# Patient Record
Sex: FEMALE | Race: WHITE | NOT HISPANIC OR LATINO | Employment: OTHER | URBAN - METROPOLITAN AREA
[De-identification: names, ages, dates, MRNs, and addresses within clinical notes are randomized per-mention and may not be internally consistent; named-entity substitution may affect disease eponyms.]

---

## 2017-01-17 ENCOUNTER — OFFICE VISIT (OUTPATIENT)
Dept: NEUROSURGERY | Facility: CLINIC | Age: 82
End: 2017-01-17

## 2017-01-17 VITALS
DIASTOLIC BLOOD PRESSURE: 82 MMHG | BODY MASS INDEX: 23.98 KG/M2 | HEART RATE: 79 BPM | WEIGHT: 127 LBS | SYSTOLIC BLOOD PRESSURE: 132 MMHG | HEIGHT: 61 IN

## 2017-01-17 DIAGNOSIS — M51.16 NEURITIS OR RADICULITIS DUE TO RUPTURE OF LUMBAR INTERVERTEBRAL DISC: Primary | ICD-10-CM

## 2017-01-17 PROCEDURE — 99203 OFFICE O/P NEW LOW 30 MIN: CPT | Performed by: NEUROLOGICAL SURGERY

## 2017-01-17 NOTE — PROGRESS NOTES
Subjective   Patient ID: Nyasia Dalal is a 83 y.o. female is being seen for consultation today at the request of Guilherme Oglesby MD for back pain that radiates into left leg with numbness in left thigh.    History of Present Illness    This patient has been having pain in her back and into her left leg since last August.  She says that she has had 2 episodes of severe pain.  These both lasted a couple of days and then it went away.  In the mornings she has pain in her left buttock but this goes away with activity.  She has also had some numbness in her anterolateral thigh on the left side for quite some time.  Her right leg for the most part is okay and she has no difficulty with bowel and bladder control or other associated symptoms.  She says that sometimes the pain is worse in the morning when she has been laying down all night.  Nothing else specifically makes it worse.  She has been treated with some steroids and with muscle relaxants which helped.  She has had no other treatment so far.    The following portions of the patient's history were reviewed and updated as appropriate: allergies, current medications, past family history, past medical history, past social history, past surgical history and problem list.    Review of Systems   Constitutional: Positive for appetite change and fatigue.   HENT: Positive for rhinorrhea.    Respiratory: Negative for chest tightness and shortness of breath.    Cardiovascular: Negative for chest pain.   Musculoskeletal: Positive for back pain.   Neurological: Positive for weakness and numbness ( Left thigh).        Tingling in upper left thigh   Psychiatric/Behavioral: The patient is nervous/anxious.    All other systems reviewed and are negative.      Objective   Physical Exam   Constitutional: She is oriented to person, place, and time. She appears well-developed and well-nourished.   HENT:   Head: Normocephalic and atraumatic.   Eyes: Conjunctivae and EOM are normal.  Pupils are equal, round, and reactive to light.   Fundoscopic exam:       The right eye shows no papilledema. The right eye shows venous pulsations.        The left eye shows no papilledema. The left eye shows venous pulsations.   Neck: Carotid bruit is not present.   Neurological: She is oriented to person, place, and time. She has a normal Finger-Nose-Finger Test and a normal Heel to Shin Test. Gait normal.   Reflex Scores:       Tricep reflexes are 2+ on the right side and 2+ on the left side.       Bicep reflexes are 2+ on the right side and 2+ on the left side.       Brachioradialis reflexes are 2+ on the right side and 2+ on the left side.       Patellar reflexes are 2+ on the right side and 2+ on the left side.       Achilles reflexes are 2+ on the right side and 2+ on the left side.  Psychiatric: Her speech is normal.     Neurologic Exam     Mental Status   Oriented to person, place, and time.   Registration of memory: Good recent and remote memory.   Attention: normal. Concentration: normal.   Speech: speech is normal   Level of consciousness: alert  Knowledge: consistent with education.     Cranial Nerves     CN II   Visual fields full to confrontation.   Visual acuity: normal    CN III, IV, VI   Pupils are equal, round, and reactive to light.  Extraocular motions are normal.     CN V   Facial sensation intact.   Right corneal reflex: normal  Left corneal reflex: normal    CN VII   Facial expression full, symmetric.   Right facial weakness: none  Left facial weakness: none    CN VIII   Hearing: intact    CN IX, X   Palate: symmetric    CN XI   Right sternocleidomastoid strength: normal  Left sternocleidomastoid strength: normal    CN XII   Tongue: not atrophic  Tongue deviation: none    Motor Exam   Muscle bulk: normal  Right arm tone: normal  Left arm tone: normal  Right leg tone: normal  Left leg tone: normal    Strength   Strength 5/5 except as noted.     Sensory Exam   Light touch normal.     Gait,  Coordination, and Reflexes     Gait  Gait: normal    Coordination   Finger to nose coordination: normal  Heel to shin coordination: normal    Reflexes   Right brachioradialis: 2+  Left brachioradialis: 2+  Right biceps: 2+  Left biceps: 2+  Right triceps: 2+  Left triceps: 2+  Right patellar: 2+  Left patellar: 2+  Right achilles: 2+  Left achilles: 2+  Right : 2+  Left : 2+      Assessment/Plan   Independent Review of Radiographic Studies:      I reviewed the plain films and the MRI done in December of last year.  The plain films show a grade 1 spondylolisthesis almost 2 of L4 on L5 and multilevel degenerative change.  Flexion and extension films were not done but there is not much scoliosis on the AP film.  On the MRI itself there is a herniated disc off to the left side at L2-3 extending up behind the L2 vertebral body.  L1 2 is fairly open.  L3 4 is fairly narrow due to lateral recess stenosis at L4 5 shows virtually no canal at all.  L5-S1 is also fairly open.    Medical Decision Making:      I told the patient and her daughter about the imaging and the exam.  Other than some numbness she really doesn't have much in the way of a neurologic deficit.  I told her that with her age I would tend to stay away from any type of surgery.  I think that most of her symptoms are coming from the herniated disc on the left side at L2-3 and not from the stenosis.  I am concerned however that even a minimally invasive surgery at L2-3 could flare of the other levels up.  Consequently I think we should really try to stay away from surgery.  I told her that we should also stay away from epidural blocks since her pain is not that severe right now.  I initially suggested some physical therapy but she does water aerobics on around but hasn't done them for a while.  Consequently I think she should just returned to her water aerobics and hold off on any physical therapy.  I told her that if the water aerobics are not helping  she should call and we can always order physical therapy later on.    Nyasia was seen today for back pain.    Diagnoses and all orders for this visit:    Neuritis or radiculitis due to rupture of lumbar intervertebral disc    No Follow-up on file.

## 2017-01-17 NOTE — LETTER
January 17, 2017     Guilherme Oglesby MD  [address] [address] [facility] [address]  [facility] [address]    Patient: Nyasia Dalal   YOB: 1933   Date of Visit: 1/17/2017       Dear Dr. Mendel MD:    Thank you for referring Nyasia Dalal to me for evaluation. Below are the relevant portions of my assessment and plan of care.      Independent Review of Radiographic Studies:      I reviewed the plain films and the MRI done in December of last year.  The plain films show a grade 1 spondylolisthesis almost 2 of L4 on L5 and multilevel degenerative change.  Flexion and extension films were not done but there is not much scoliosis on the AP film.  On the MRI itself there is a herniated disc off to the left side at L2-3 extending up behind the L2 vertebral body.  L1 2 is fairly open.  L3 4 is fairly narrow due to lateral recess stenosis at L4 5 shows virtually no canal at all.  L5-S1 is also fairly open.    Medical Decision Making:      I told the patient and her daughter about the imaging and the exam.  Other than some numbness she really doesn't have much in the way of a neurologic deficit.  I told her that with her age I would tend to stay away from any type of surgery.  I think that most of her symptoms are coming from the herniated disc on the left side at L2-3 and not from the stenosis.  I am concerned however that even a minimally invasive surgery at L2-3 could flare of the other levels up.  Consequently I think we should really try to stay away from surgery.  I told her that we should also stay away from epidural blocks since her pain is not that severe right now.  I initially suggested some physical therapy but she does water aerobics on around but hasn't done them for a while.  Consequently I think she should just returned to her water aerobics and hold off on any physical therapy.  I told her that if the water aerobics are not helping she should call and we can always order physical therapy later on.    Nyasia  was seen today for back pain.    Diagnoses and all orders for this visit:    Neuritis or radiculitis due to rupture of lumbar intervertebral disc    No Follow-up on file.                    If you have questions, please do not hesitate to call me. I look forward to following Nyasia along with you.         Sincerely,        Hany Barriga MD        CC: No Recipients

## 2017-08-29 ENCOUNTER — TRANSCRIBE ORDERS (OUTPATIENT)
Dept: ADMINISTRATIVE | Facility: HOSPITAL | Age: 82
End: 2017-08-29

## 2017-08-29 DIAGNOSIS — R41.3 MEMORY LOSS: Primary | ICD-10-CM

## 2017-09-06 ENCOUNTER — LAB (OUTPATIENT)
Dept: LAB | Facility: HOSPITAL | Age: 82
End: 2017-09-06

## 2017-09-06 ENCOUNTER — TRANSCRIBE ORDERS (OUTPATIENT)
Dept: ADMINISTRATIVE | Facility: HOSPITAL | Age: 82
End: 2017-09-06

## 2017-09-06 ENCOUNTER — HOSPITAL ENCOUNTER (OUTPATIENT)
Dept: MRI IMAGING | Facility: HOSPITAL | Age: 82
Discharge: HOME | End: 2017-09-06
Admitting: INTERNAL MEDICINE

## 2017-09-06 ENCOUNTER — HOSPITAL ENCOUNTER (OUTPATIENT)
Dept: GENERAL RADIOLOGY | Facility: HOSPITAL | Age: 82
Discharge: HOME | End: 2017-09-06

## 2017-09-06 DIAGNOSIS — M25.552 PAIN OF LEFT HIP JOINT: ICD-10-CM

## 2017-09-06 DIAGNOSIS — M81.0 OSTEOPOROSIS: ICD-10-CM

## 2017-09-06 DIAGNOSIS — D64.9 ANEMIA, UNSPECIFIED: ICD-10-CM

## 2017-09-06 DIAGNOSIS — D64.9 ANEMIA, UNSPECIFIED: Primary | ICD-10-CM

## 2017-09-06 DIAGNOSIS — R41.3 MEMORY LOSS: ICD-10-CM

## 2017-09-06 LAB
25(OH)D3 SERPL-MCNC: 48.1 NG/ML (ref 30–100)
ALBUMIN SERPL-MCNC: 4.4 G/DL (ref 3.5–5.2)
ALBUMIN/GLOB SERPL: 1.4 G/DL
ALP SERPL-CCNC: 64 U/L (ref 39–117)
ALT SERPL W P-5'-P-CCNC: 17 U/L (ref 1–33)
ANION GAP SERPL CALCULATED.3IONS-SCNC: 13.7 MMOL/L
AST SERPL-CCNC: 20 U/L (ref 1–32)
BASOPHILS # BLD AUTO: 0.05 10*3/MM3 (ref 0–0.2)
BASOPHILS NFR BLD AUTO: 0.7 % (ref 0–1.5)
BILIRUB SERPL-MCNC: 0.5 MG/DL (ref 0.1–1.2)
BUN BLD-MCNC: 20 MG/DL (ref 8–23)
BUN/CREAT SERPL: 28.6 (ref 7–25)
CALCIUM SPEC-SCNC: 9.9 MG/DL (ref 8.6–10.5)
CHLORIDE SERPL-SCNC: 96 MMOL/L (ref 98–107)
CO2 SERPL-SCNC: 28.3 MMOL/L (ref 22–29)
CREAT BLD-MCNC: 0.7 MG/DL (ref 0.57–1)
CREAT BLDA-MCNC: 0.7 MG/DL (ref 0.6–1.3)
DEPRECATED RDW RBC AUTO: 49.6 FL (ref 37–54)
EOSINOPHIL # BLD AUTO: 0.14 10*3/MM3 (ref 0–0.7)
EOSINOPHIL NFR BLD AUTO: 1.9 % (ref 0.3–6.2)
ERYTHROCYTE [DISTWIDTH] IN BLOOD BY AUTOMATED COUNT: 13.7 % (ref 11.7–13)
FOLATE SERPL-MCNC: >20 NG/ML (ref 4.78–24.2)
GFR SERPL CREATININE-BSD FRML MDRD: 80 ML/MIN/1.73
GLOBULIN UR ELPH-MCNC: 3.1 GM/DL
GLUCOSE BLD-MCNC: 96 MG/DL (ref 65–99)
HCT VFR BLD AUTO: 39.2 % (ref 35.6–45.5)
HGB BLD-MCNC: 12.4 G/DL (ref 11.9–15.5)
IMM GRANULOCYTES # BLD: 0 10*3/MM3 (ref 0–0.03)
IMM GRANULOCYTES NFR BLD: 0 % (ref 0–0.5)
LYMPHOCYTES # BLD AUTO: 2.15 10*3/MM3 (ref 0.9–4.8)
LYMPHOCYTES NFR BLD AUTO: 28.8 % (ref 19.6–45.3)
MCH RBC QN AUTO: 31.3 PG (ref 26.9–32)
MCHC RBC AUTO-ENTMCNC: 31.6 G/DL (ref 32.4–36.3)
MCV RBC AUTO: 99 FL (ref 80.5–98.2)
MONOCYTES # BLD AUTO: 0.73 10*3/MM3 (ref 0.2–1.2)
MONOCYTES NFR BLD AUTO: 9.8 % (ref 5–12)
NEUTROPHILS # BLD AUTO: 4.39 10*3/MM3 (ref 1.9–8.1)
NEUTROPHILS NFR BLD AUTO: 58.8 % (ref 42.7–76)
PLATELET # BLD AUTO: 272 10*3/MM3 (ref 140–500)
PMV BLD AUTO: 11.4 FL (ref 6–12)
POTASSIUM BLD-SCNC: 4.3 MMOL/L (ref 3.5–5.2)
PROT SERPL-MCNC: 7.5 G/DL (ref 6–8.5)
RBC # BLD AUTO: 3.96 10*6/MM3 (ref 3.9–5.2)
SODIUM BLD-SCNC: 138 MMOL/L (ref 136–145)
TSH SERPL DL<=0.05 MIU/L-ACNC: 1.23 MIU/ML (ref 0.27–4.2)
VIT B12 BLD-MCNC: 611 PG/ML (ref 211–946)
WBC NRBC COR # BLD: 7.46 10*3/MM3 (ref 4.5–10.7)

## 2017-09-06 PROCEDURE — 0 GADOBENATE DIMEGLUMINE 529 MG/ML SOLUTION: Performed by: INTERNAL MEDICINE

## 2017-09-06 PROCEDURE — 80053 COMPREHEN METABOLIC PANEL: CPT | Performed by: INTERNAL MEDICINE

## 2017-09-06 PROCEDURE — 82306 VITAMIN D 25 HYDROXY: CPT | Performed by: INTERNAL MEDICINE

## 2017-09-06 PROCEDURE — 85025 COMPLETE CBC W/AUTO DIFF WBC: CPT | Performed by: INTERNAL MEDICINE

## 2017-09-06 PROCEDURE — 82607 VITAMIN B-12: CPT | Performed by: INTERNAL MEDICINE

## 2017-09-06 PROCEDURE — 82746 ASSAY OF FOLIC ACID SERUM: CPT | Performed by: INTERNAL MEDICINE

## 2017-09-06 PROCEDURE — 70553 MRI BRAIN STEM W/O & W/DYE: CPT

## 2017-09-06 PROCEDURE — 73502 X-RAY EXAM HIP UNI 2-3 VIEWS: CPT

## 2017-09-06 PROCEDURE — 36415 COLL VENOUS BLD VENIPUNCTURE: CPT

## 2017-09-06 PROCEDURE — A9577 INJ MULTIHANCE: HCPCS | Performed by: INTERNAL MEDICINE

## 2017-09-06 PROCEDURE — 82565 ASSAY OF CREATININE: CPT

## 2017-09-06 PROCEDURE — 84443 ASSAY THYROID STIM HORMONE: CPT | Performed by: INTERNAL MEDICINE

## 2017-09-06 RX ADMIN — GADOBENATE DIMEGLUMINE 11 ML: 529 INJECTION, SOLUTION INTRAVENOUS at 10:45

## 2017-09-19 ENCOUNTER — TRANSCRIBE ORDERS (OUTPATIENT)
Dept: ADMINISTRATIVE | Facility: HOSPITAL | Age: 82
End: 2017-09-19

## 2017-09-19 DIAGNOSIS — M81.0 OSTEOPOROSIS, UNSPECIFIED: Primary | ICD-10-CM

## 2017-09-28 ENCOUNTER — HOSPITAL ENCOUNTER (OUTPATIENT)
Dept: INFUSION THERAPY | Facility: HOSPITAL | Age: 82
Discharge: HOME | End: 2017-09-28
Admitting: INTERNAL MEDICINE

## 2017-09-28 VITALS
HEART RATE: 69 BPM | SYSTOLIC BLOOD PRESSURE: 122 MMHG | BODY MASS INDEX: 23 KG/M2 | DIASTOLIC BLOOD PRESSURE: 64 MMHG | RESPIRATION RATE: 20 BRPM | TEMPERATURE: 97.9 F | OXYGEN SATURATION: 96 % | HEIGHT: 62 IN | WEIGHT: 125 LBS

## 2017-09-28 DIAGNOSIS — M81.0 OSTEOPOROSIS: ICD-10-CM

## 2017-09-28 PROCEDURE — 96365 THER/PROPH/DIAG IV INF INIT: CPT

## 2017-09-28 PROCEDURE — 25010000002 ZOLEDRONIC ACID 5 MG/100ML SOLUTION: Performed by: INTERNAL MEDICINE

## 2017-09-28 RX ORDER — ZOLEDRONIC ACID 5 MG/100ML
5 INJECTION, SOLUTION INTRAVENOUS ONCE
Status: COMPLETED | OUTPATIENT
Start: 2017-09-28 | End: 2017-09-28

## 2017-09-28 RX ORDER — ZOLEDRONIC ACID 5 MG/100ML
5 INJECTION, SOLUTION INTRAVENOUS ONCE
COMMUNITY
End: 2021-03-13

## 2017-09-28 RX ORDER — ZOLEDRONIC ACID 5 MG/100ML
5 INJECTION, SOLUTION INTRAVENOUS ONCE
Status: CANCELLED | OUTPATIENT
Start: 2017-09-28

## 2017-09-28 RX ADMIN — ZOLEDRONIC ACID 5 MG: 0.05 INJECTION, SOLUTION INTRAVENOUS at 12:21

## 2018-09-17 ENCOUNTER — LAB (OUTPATIENT)
Dept: LAB | Facility: HOSPITAL | Age: 83
End: 2018-09-17

## 2018-09-17 ENCOUNTER — TRANSCRIBE ORDERS (OUTPATIENT)
Dept: ADMINISTRATIVE | Facility: HOSPITAL | Age: 83
End: 2018-09-17

## 2018-09-17 DIAGNOSIS — Q78.2 OSTEOPETROSIS: ICD-10-CM

## 2018-09-17 DIAGNOSIS — D64.9 ANEMIA, UNSPECIFIED TYPE: ICD-10-CM

## 2018-09-17 DIAGNOSIS — Z00.00 ROUTINE GENERAL MEDICAL EXAMINATION AT A HEALTH CARE FACILITY: ICD-10-CM

## 2018-09-17 DIAGNOSIS — Z00.00 ROUTINE GENERAL MEDICAL EXAMINATION AT A HEALTH CARE FACILITY: Primary | ICD-10-CM

## 2018-09-17 LAB
25(OH)D3 SERPL-MCNC: 60 NG/ML (ref 30–100)
ALBUMIN SERPL-MCNC: 4.5 G/DL (ref 3.5–5.2)
ALBUMIN/GLOB SERPL: 1.7 G/DL
ALP SERPL-CCNC: 70 U/L (ref 39–117)
ALT SERPL W P-5'-P-CCNC: 18 U/L (ref 1–33)
ANION GAP SERPL CALCULATED.3IONS-SCNC: 11.5 MMOL/L
AST SERPL-CCNC: 20 U/L (ref 1–32)
BASOPHILS # BLD AUTO: 0.03 10*3/MM3 (ref 0–0.2)
BASOPHILS NFR BLD AUTO: 0.4 % (ref 0–1.5)
BILIRUB SERPL-MCNC: 0.6 MG/DL (ref 0.1–1.2)
BUN BLD-MCNC: 19 MG/DL (ref 8–23)
BUN/CREAT SERPL: 27.1 (ref 7–25)
CALCIUM SPEC-SCNC: 9.7 MG/DL (ref 8.6–10.5)
CHLORIDE SERPL-SCNC: 100 MMOL/L (ref 98–107)
CO2 SERPL-SCNC: 28.5 MMOL/L (ref 22–29)
CREAT BLD-MCNC: 0.7 MG/DL (ref 0.57–1)
DEPRECATED RDW RBC AUTO: 48.3 FL (ref 37–54)
EOSINOPHIL # BLD AUTO: 0.17 10*3/MM3 (ref 0–0.7)
EOSINOPHIL NFR BLD AUTO: 2.3 % (ref 0.3–6.2)
ERYTHROCYTE [DISTWIDTH] IN BLOOD BY AUTOMATED COUNT: 13.5 % (ref 11.7–13)
FOLATE SERPL-MCNC: >20 NG/ML (ref 4.78–24.2)
GFR SERPL CREATININE-BSD FRML MDRD: 80 ML/MIN/1.73
GLOBULIN UR ELPH-MCNC: 2.6 GM/DL
GLUCOSE BLD-MCNC: 88 MG/DL (ref 65–99)
HCT VFR BLD AUTO: 40.1 % (ref 35.6–45.5)
HGB BLD-MCNC: 12.3 G/DL (ref 11.9–15.5)
IMM GRANULOCYTES # BLD: 0 10*3/MM3 (ref 0–0.03)
IMM GRANULOCYTES NFR BLD: 0 % (ref 0–0.5)
LYMPHOCYTES # BLD AUTO: 2.55 10*3/MM3 (ref 0.9–4.8)
LYMPHOCYTES NFR BLD AUTO: 35.1 % (ref 19.6–45.3)
MCH RBC QN AUTO: 29.9 PG (ref 26.9–32)
MCHC RBC AUTO-ENTMCNC: 30.7 G/DL (ref 32.4–36.3)
MCV RBC AUTO: 97.6 FL (ref 80.5–98.2)
MONOCYTES # BLD AUTO: 0.68 10*3/MM3 (ref 0.2–1.2)
MONOCYTES NFR BLD AUTO: 9.4 % (ref 5–12)
NEUTROPHILS # BLD AUTO: 3.83 10*3/MM3 (ref 1.9–8.1)
NEUTROPHILS NFR BLD AUTO: 52.8 % (ref 42.7–76)
PLATELET # BLD AUTO: 316 10*3/MM3 (ref 140–500)
PMV BLD AUTO: 11.3 FL (ref 6–12)
POTASSIUM BLD-SCNC: 4.5 MMOL/L (ref 3.5–5.2)
PROT SERPL-MCNC: 7.1 G/DL (ref 6–8.5)
RBC # BLD AUTO: 4.11 10*6/MM3 (ref 3.9–5.2)
SODIUM BLD-SCNC: 140 MMOL/L (ref 136–145)
TSH SERPL DL<=0.05 MIU/L-ACNC: 1.51 MIU/ML (ref 0.27–4.2)
VIT B12 BLD-MCNC: 842 PG/ML (ref 211–946)
WBC NRBC COR # BLD: 7.26 10*3/MM3 (ref 4.5–10.7)

## 2018-09-17 PROCEDURE — 82306 VITAMIN D 25 HYDROXY: CPT | Performed by: INTERNAL MEDICINE

## 2018-09-17 PROCEDURE — 84443 ASSAY THYROID STIM HORMONE: CPT | Performed by: INTERNAL MEDICINE

## 2018-09-17 PROCEDURE — 82746 ASSAY OF FOLIC ACID SERUM: CPT | Performed by: INTERNAL MEDICINE

## 2018-09-17 PROCEDURE — 80053 COMPREHEN METABOLIC PANEL: CPT | Performed by: INTERNAL MEDICINE

## 2018-09-17 PROCEDURE — 85025 COMPLETE CBC W/AUTO DIFF WBC: CPT | Performed by: INTERNAL MEDICINE

## 2018-09-17 PROCEDURE — 82607 VITAMIN B-12: CPT | Performed by: INTERNAL MEDICINE

## 2018-09-17 PROCEDURE — 36415 COLL VENOUS BLD VENIPUNCTURE: CPT

## 2018-10-11 ENCOUNTER — TRANSCRIBE ORDERS (OUTPATIENT)
Dept: ADMINISTRATIVE | Facility: HOSPITAL | Age: 83
End: 2018-10-11

## 2018-10-11 DIAGNOSIS — M81.0 POST-MENOPAUSAL OSTEOPOROSIS: Primary | ICD-10-CM

## 2018-10-22 ENCOUNTER — HOSPITAL ENCOUNTER (OUTPATIENT)
Dept: INFUSION THERAPY | Facility: HOSPITAL | Age: 83
Discharge: HOME | End: 2018-10-22
Admitting: INTERNAL MEDICINE

## 2018-10-22 VITALS
DIASTOLIC BLOOD PRESSURE: 76 MMHG | RESPIRATION RATE: 20 BRPM | HEART RATE: 76 BPM | SYSTOLIC BLOOD PRESSURE: 142 MMHG | BODY MASS INDEX: 23.98 KG/M2 | WEIGHT: 129 LBS | TEMPERATURE: 97.6 F | OXYGEN SATURATION: 95 %

## 2018-10-22 DIAGNOSIS — M81.0 OSTEOPOROSIS, UNSPECIFIED OSTEOPOROSIS TYPE, UNSPECIFIED PATHOLOGICAL FRACTURE PRESENCE: ICD-10-CM

## 2018-10-22 PROCEDURE — 25010000002 ZOLEDRONIC ACID 5 MG/100ML SOLUTION: Performed by: INTERNAL MEDICINE

## 2018-10-22 PROCEDURE — 96374 THER/PROPH/DIAG INJ IV PUSH: CPT

## 2018-10-22 PROCEDURE — 96365 THER/PROPH/DIAG IV INF INIT: CPT

## 2018-10-22 RX ORDER — ZOLEDRONIC ACID 5 MG/100ML
5 INJECTION, SOLUTION INTRAVENOUS ONCE
Status: CANCELLED | OUTPATIENT
Start: 2018-10-22

## 2018-10-22 RX ORDER — ZOLEDRONIC ACID 5 MG/100ML
5 INJECTION, SOLUTION INTRAVENOUS ONCE
Status: COMPLETED | OUTPATIENT
Start: 2018-10-22 | End: 2018-10-22

## 2018-10-22 RX ADMIN — ZOLEDRONIC ACID 5 MG: 5 INJECTION, SOLUTION INTRAVENOUS at 13:17

## 2018-10-22 NOTE — PATIENT INSTRUCTIONS
Zoledronic Acid injection (Paget's Disease, Osteoporosis)  What is this medicine?  ZOLEDRONIC ACID (FANNY javid go ik AS id) lowers the amount of calcium loss from bone. It is used to treat Paget's disease and osteoporosis in women.  This medicine may be used for other purposes; ask your health care provider or pharmacist if you have questions.  COMMON BRAND NAME(S): Reclast, Zometa  What should I tell my health care provider before I take this medicine?  They need to know if you have any of these conditions:  -aspirin-sensitive asthma  -cancer, especially if you are receiving medicines used to treat cancer  -dental disease or wear dentures  -infection  -kidney disease  -low levels of calcium in the blood  -past surgery on the parathyroid gland or intestines  -receiving corticosteroids like dexamethasone or prednisone  -an unusual or allergic reaction to zoledronic acid, other medicines, foods, dyes, or preservatives  -pregnant or trying to get pregnant  -breast-feeding  How should I use this medicine?  This medicine is for infusion into a vein. It is given by a health care professional in a hospital or clinic setting.  Talk to your pediatrician regarding the use of this medicine in children. This medicine is not approved for use in children.  Overdosage: If you think you have taken too much of this medicine contact a poison control center or emergency room at once.  NOTE: This medicine is only for you. Do not share this medicine with others.  What if I miss a dose?  It is important not to miss your dose. Call your doctor or health care professional if you are unable to keep an appointment.  What may interact with this medicine?  -certain antibiotics given by injection  -NSAIDs, medicines for pain and inflammation, like ibuprofen or naproxen  -some diuretics like bumetanide, furosemide  -teriparatide  This list may not describe all possible interactions. Give your health care provider a list of all the medicines,  herbs, non-prescription drugs, or dietary supplements you use. Also tell them if you smoke, drink alcohol, or use illegal drugs. Some items may interact with your medicine.  What should I watch for while using this medicine?  Visit your doctor or health care professional for regular checkups. It may be some time before you see the benefit from this medicine. Do not stop taking your medicine unless your doctor tells you to. Your doctor may order blood tests or other tests to see how you are doing.  Women should inform their doctor if they wish to become pregnant or think they might be pregnant. There is a potential for serious side effects to an unborn child. Talk to your health care professional or pharmacist for more information.  You should make sure that you get enough calcium and vitamin D while you are taking this medicine. Discuss the foods you eat and the vitamins you take with your health care professional.  Some people who take this medicine have severe bone, joint, and/or muscle pain. This medicine may also increase your risk for jaw problems or a broken thigh bone. Tell your doctor right away if you have severe pain in your jaw, bones, joints, or muscles. Tell your doctor if you have any pain that does not go away or that gets worse.  Tell your dentist and dental surgeon that you are taking this medicine. You should not have major dental surgery while on this medicine. See your dentist to have a dental exam and fix any dental problems before starting this medicine. Take good care of your teeth while on this medicine. Make sure you see your dentist for regular follow-up appointments.  What side effects may I notice from receiving this medicine?  Side effects that you should report to your doctor or health care professional as soon as possible:  -allergic reactions like skin rash, itching or hives, swelling of the face, lips, or tongue  -anxiety, confusion, or depression  -breathing problems  -changes in  vision  -eye pain  -feeling faint or lightheaded, falls  -jaw pain, especially after dental work  -mouth sores  -muscle cramps, stiffness, or weakness  -redness, blistering, peeling or loosening of the skin, including inside the mouth  -trouble passing urine or change in the amount of urine  Side effects that usually do not require medical attention (report to your doctor or health care professional if they continue or are bothersome):  -bone, joint, or muscle pain  -constipation  -diarrhea  -fever  -hair loss  -irritation at site where injected  -loss of appetite  -nausea, vomiting  -stomach upset  -trouble sleeping  -trouble swallowing  -weak or tired  This list may not describe all possible side effects. Call your doctor for medical advice about side effects. You may report side effects to FDA at 9-874-FDA-4027.  Where should I keep my medicine?  This drug is given in a hospital or clinic and will not be stored at home.  NOTE: This sheet is a summary. It may not cover all possible information. If you have questions about this medicine, talk to your doctor, pharmacist, or health care provider.  © 2018 [employer]/Gold Standard (2015-05-16 14:19:57)

## 2018-12-17 ENCOUNTER — TRANSCRIBE ORDERS (OUTPATIENT)
Dept: ADMINISTRATIVE | Facility: HOSPITAL | Age: 83
End: 2018-12-17

## 2018-12-17 DIAGNOSIS — M54.5 LOW BACK PAIN, UNSPECIFIED BACK PAIN LATERALITY, UNSPECIFIED CHRONICITY, WITH SCIATICA PRESENCE UNSPECIFIED: ICD-10-CM

## 2018-12-17 DIAGNOSIS — R13.10 DYSPHAGIA, UNSPECIFIED TYPE: Primary | ICD-10-CM

## 2018-12-27 ENCOUNTER — HOSPITAL ENCOUNTER (OUTPATIENT)
Dept: GENERAL RADIOLOGY | Facility: HOSPITAL | Age: 83
Discharge: HOME | End: 2018-12-27
Admitting: INTERNAL MEDICINE

## 2018-12-27 DIAGNOSIS — R13.10 DYSPHAGIA, UNSPECIFIED TYPE: ICD-10-CM

## 2018-12-27 PROCEDURE — 63710000001 BARIUM SULFATE 96 % RECONSTITUTED SUSPENSION: Performed by: INTERNAL MEDICINE

## 2018-12-27 PROCEDURE — 74230 X-RAY XM SWLNG FUNCJ C+: CPT

## 2018-12-27 PROCEDURE — A9270 NON-COVERED ITEM OR SERVICE: HCPCS | Performed by: INTERNAL MEDICINE

## 2018-12-27 PROCEDURE — G8998 SWALLOW D/C STATUS: HCPCS

## 2018-12-27 PROCEDURE — G8997 SWALLOW GOAL STATUS: HCPCS

## 2018-12-27 PROCEDURE — 63710000001 BARIUM SULFATE 40 % SUSPENSION: Performed by: INTERNAL MEDICINE

## 2018-12-27 PROCEDURE — 63710000001 BARIUM SULFATE 98 % RECONSTITUTED SUSPENSION: Performed by: INTERNAL MEDICINE

## 2018-12-27 PROCEDURE — G8996 SWALLOW CURRENT STATUS: HCPCS

## 2018-12-27 PROCEDURE — 92611 MOTION FLUOROSCOPY/SWALLOW: CPT

## 2018-12-27 RX ADMIN — BARIUM SULFATE 65 ML: 960 POWDER, FOR SUSPENSION ORAL at 13:33

## 2018-12-27 RX ADMIN — BARIUM SULFATE 4 ML: 980 POWDER, FOR SUSPENSION ORAL at 13:33

## 2018-12-27 RX ADMIN — BARIUM SULFATE 50 ML: 400 SUSPENSION ORAL at 13:33

## 2018-12-27 NOTE — MBS/VFSS/FEES
Speech Language Pathology   MBS FEES / Discharge Summary  [facility]       Patient Name: Nyasia Dalal  : 1933  MRN: [id number]    Today's Date: 2018      Visit Date: 2018     Visit Dx:     ICD-10-CM ICD-9-CM   1. Dysphagia, unspecified type R13.10 787.20       Patient Active Problem List   Diagnosis   • Osteoporosis        Past Medical History:   Diagnosis Date   • Breast cancer (CMS/HCC) 2014   • Irritable bowel syndrome    • Osteoporosis    • Skin cancer         Past Surgical History:   Procedure Laterality Date   • BREAST LUMPECTOMY Left    • CATARACT EXTRACTION EXTRACAPSULAR W/ INTRAOCULAR LENS IMPLANTATION Bilateral    • COLONOSCOPY     • EYE SURGERY     • SKIN CANCER EXCISION  2012                 SLP Adult Swallow Evaluation - 18 1346        Rehab Evaluation    Document Type  evaluation   -OC    Subjective Information  no complaints   -OC    Patient Observations  alert;cooperative;agree to therapy   -OC    Patient Effort  good   -OC    Symptoms Noted During/After Treatment  none   -OC       General Information    Patient Profile Reviewed  yes   -OC    Pertinent History Of Current Problem  Reports coughing with coffee   -OC    Current Method of Nutrition  regular textures;thin liquids   -OC    Precautions/Limitations, Vision  WFL with corrective lenses   -OC    Precautions/Limitations, Hearing  WFL   -OC    Prior Level of Function-Communication  WFL;other (see comments) reports changes with speech over last 2 months    -OC    Prior Level of Function-Swallowing  no diet consistency restrictions   -OC    Plans/Goals Discussed with  patient;family;agreed upon   -OC    Barriers to Rehab  none identified   -OC    Patient's Goals for Discharge  patient did not state   -OC       Pain Assessment    Additional Documentation  Pain Scale: Numbers Pre/Post-Treatment (Group)   -OC       Pain Scale: Numbers Pre/Post-Treatment    Pain Scale: Numbers, Pretreatment  0/10 - no pain   -OC     Pain Scale: Numbers, Post-Treatment  0/10 - no pain   -OC       Oral Motor and Function    Dentition Assessment  natural, present and adequate   -OC    Secretion Management  WNL/WFL   -OC    Mucosal Quality  moist, healthy   -OC    Volitional Swallow  WFL   -OC    Volitional Cough  WFL   -OC       Oral Musculature and Cranial Nerve Assessment    Oral Motor General Assessment  WFL   -OC       MBS/VFSS Interpretation    VFSS Summary  VFSS completed. Swallow appears functional/age appropriate. No penetration or aspiration observed with thin via cup, nectar thick, puree, mech soft, and regular textures. Esophageal scan appears WFL.    -OC       SLP Communication to Radiology    Summary Statement  VFSS completed. Swallow appears functional/age appropriate. No penetration or aspiration observed with thin via cup, nectar thick, puree, mech soft, and regular textures. Esophageal scan appears WFL.    -OC       Clinical Impression    SLP Swallowing Diagnosis  functional oral phase;functional pharyngeal phase   -OC    Functional Impact  no impact on function   -OC    Rehab Potential/Prognosis, Swallowing  good, to achieve stated therapy goals   -OC    Swallow Criteria for Skilled Therapeutic Interventions Met  baseline status;no problems identified which require skilled intervention   -OC       Recommendations    Therapy Frequency (Swallow)  evaluation only   -OC    SLP Diet Recommendation  regular textures;thin liquids   -OC    Recommended Precautions and Strategies  upright posture during/after eating;small bites of food and sips of liquid   -OC    SLP Rec. for Method of Medication Administration  meds whole;with thin liquids;with pudding or applesauce   -OC    Monitor for Signs of Aspiration  yes;notify SLP if any concerns   -OC    Anticipated Dischage Disposition  home   -OC    Demonstrates Need for Referral to Another Service  other (see comments) OP speech eval pending results MRI   -OC      User Key  (r) = Recorded  By, (t) = Taken By, (c) = Cosigned By    Initials Name Provider Type    OC Mara Preston MA,[facility]-SLP Speech and Language Pathologist                         OP SLP Education     Row Name 12/27/18 1350       Education    Barriers to Learning  No barriers identified  -OC    Action Taken to Address Barriers  Family member present  -OC    Education Provided  Described results of evaluation;Patient expressed understanding of evaluation  -OC    Assessed  Learning needs;Learning motivation;Learning preferences;Learning readiness  -OC    Learning Motivation  Strong  -OC    Learning Method  Explanation  -OC    Teaching Response  Verbalized understanding  -OC    Education Comments  Could trial coffee at cooler temperature  -OC      User Key  (r) = Recorded By, (t) = Taken By, (c) = Cosigned By    Initials Name Effective Dates    OC Mara Preston MA,CCC-SLP 06/08/18 -               OP SLP Assessment/Plan - 12/27/18 1349        SLP Assessment    Functional Problems  Swallowing   -OC    Impact on Function: Swallowing  Risk of aspiration   -OC    Clinical Impression: Swallowing  WNL   -OC    Functional Problems Comment  coughing on coffee   -OC    Please refer to paper survey for additional self-reported information  No   -OC    Please refer to items scanned into chart for additional diagnostic informaiton and handouts as provided by clinician  No   -OC    SLP Diagnosis  Functional/age appropriate swallow   -OC    Prognosis  Excellent (comment)   -OC    Patient/caregiver participated in establishment of treatment plan and goals  Yes   -OC    Patient would benefit from skilled therapy intervention  No   -OC       SLP Plan    Frequency  N/A   -OC    Duration  N/A   -OC    Planned CPT's?  SLP MBS: 89853   -OC    Expected Duration Therapy Session - minutes  45-60 minutes   -OC    Plan Comments  Follow up with MD after MRIs, OP speech eval as warranted pending MRI results   -OC      User Key  (r) = Recorded By, (t) = Taken By, (c) =  Cosigned By    Initials Name Provider Type    Mara Resendiz MA,CCC-SLP Speech and Language Pathologist              SLP Outcome Measures (last 72 hours)      SLP Outcome Measures     Row Name 12/27/18 1350             SLP Outcome Measures    Outcome Measure Used?  Adult NOMS  -OC         Adult FCM Scores    FCM Chosen  Swallowing  -OC      Swallowing FCM Score  7  -OC        User Key  (r) = Recorded By, (t) = Taken By, (c) = Cosigned By    Initials Name Effective Dates    Mara Resendiz MA,[facility]-SLP 06/08/18 -                   OP SLP Discharge Summary  Reason for Discharge: all goals and outcomes met, no further needs identified  Progress Toward Achieving Short/long Term Goals: all goals met within established timelines  Discharge Destination: home      Time Calculation:        Therapy Charges for Today     Code Description Service Date Service Provider Modifiers Qty    64102659130 HC ST SWALLOWING CURRENT STATUS 12/27/2018 Mara Preston MA,CCC-SLP GN, CH 1    [id number] HC ST SWALLOWING PROJECTED 12/27/2018 Mara Preston MA,CCC-SLP GN, CH 1    [id number] HC ST SWALLOWING DISCHARGE 12/27/2018 Mara Preston MA,CCC-SLP GN, CH 1    [id number] HC ST MOTION FLUORO EVAL SWALLOW 4 12/27/2018 Mara Preston MA,CCC-SLP GN, KX 1                  Mara Preston MA, CCC-SLP  12/27/2018

## 2019-01-08 ENCOUNTER — HOSPITAL ENCOUNTER (OUTPATIENT)
Dept: MRI IMAGING | Facility: HOSPITAL | Age: 84
Discharge: HOME | End: 2019-01-08
Admitting: INTERNAL MEDICINE

## 2019-01-08 ENCOUNTER — HOSPITAL ENCOUNTER (OUTPATIENT)
Dept: MRI IMAGING | Facility: HOSPITAL | Age: 84
Discharge: HOME | End: 2019-01-08

## 2019-01-08 DIAGNOSIS — M54.5 LOW BACK PAIN, UNSPECIFIED BACK PAIN LATERALITY, UNSPECIFIED CHRONICITY, WITH SCIATICA PRESENCE UNSPECIFIED: ICD-10-CM

## 2019-01-08 DIAGNOSIS — R13.10 DYSPHAGIA, UNSPECIFIED TYPE: ICD-10-CM

## 2019-01-08 LAB — CREAT BLDA-MCNC: 0.8 MG/DL (ref 0.6–1.3)

## 2019-01-08 PROCEDURE — 0 GADOBENATE DIMEGLUMINE 529 MG/ML SOLUTION: Performed by: INTERNAL MEDICINE

## 2019-01-08 PROCEDURE — 82565 ASSAY OF CREATININE: CPT

## 2019-01-08 PROCEDURE — 72158 MRI LUMBAR SPINE W/O & W/DYE: CPT

## 2019-01-08 PROCEDURE — A9577 INJ MULTIHANCE: HCPCS | Performed by: INTERNAL MEDICINE

## 2019-01-08 PROCEDURE — 70553 MRI BRAIN STEM W/O & W/DYE: CPT

## 2019-01-08 RX ADMIN — GADOBENATE DIMEGLUMINE 11 ML: 529 INJECTION, SOLUTION INTRAVENOUS at 13:44

## 2019-02-15 NOTE — PROGRESS NOTES
Subjective   Patient ID: Nyasia Dalal is a 85 y.o. female is here today for follow-up on back pain. She has some stiffness around her left knee.    History of Present Illness    Has been having fairly severe pain primarily in her left leg.  It radiates all the way down her left leg to the knee.  It occurs primarily when she is turning over in bed and sometimes when she is sitting for a long time.  It does not seem to affect her walking.  The right side is okay.  She has no difficulty with bowel and bladder control or other associated symptoms.    The following portions of the patient's history were reviewed and updated as appropriate: allergies, current medications, past family history, past medical history, past social history, past surgical history and problem list.    Review of Systems   Constitutional: Positive for activity change.   Respiratory: Negative for chest tightness and shortness of breath.    Cardiovascular: Negative for chest pain.   Musculoskeletal: Positive for back pain.   All other systems reviewed and are negative.      Objective   Physical Exam   Constitutional: She is oriented to person, place, and time. She appears well-developed and well-nourished.   Eyes: EOM are normal. Pupils are equal, round, and reactive to light.   Neurological: She is oriented to person, place, and time. She has a normal Finger-Nose-Finger Test and a normal Heel to Shin Test. Gait normal.   Reflex Scores:       Tricep reflexes are 2+ on the right side and 2+ on the left side.       Bicep reflexes are 2+ on the right side and 2+ on the left side.       Brachioradialis reflexes are 2+ on the right side and 2+ on the left side.       Patellar reflexes are 2+ on the right side and 2+ on the left side.       Achilles reflexes are 2+ on the right side and 2+ on the left side.  Psychiatric: Her speech is normal.     Neurologic Exam     Mental Status   Oriented to person, place, and time.   Registration of memory: Good recent  and remote memory.   Attention: normal. Concentration: normal.   Speech: speech is normal   Level of consciousness: alert  Knowledge: consistent with education.     Cranial Nerves     CN II   Visual fields full to confrontation.   Visual acuity: normal    CN III, IV, VI   Pupils are equal, round, and reactive to light.  Extraocular motions are normal.     CN V   Facial sensation intact.   Right corneal reflex: normal  Left corneal reflex: normal    CN VII   Facial expression full, symmetric.   Right facial weakness: none  Left facial weakness: none    CN VIII   Hearing: intact    CN IX, X   Palate: symmetric    CN XI   Right sternocleidomastoid strength: normal  Left sternocleidomastoid strength: normal    CN XII   Tongue: not atrophic  Tongue deviation: none    Motor Exam   Muscle bulk: normal  Right arm tone: normal  Left arm tone: normal  Right leg tone: normal  Left leg tone: normal    Strength   Strength 5/5 except as noted.     Sensory Exam   Light touch normal.     Gait, Coordination, and Reflexes     Gait  Gait: normal    Coordination   Finger to nose coordination: normal  Heel to shin coordination: normal    Reflexes   Right brachioradialis: 2+  Left brachioradialis: 2+  Right biceps: 2+  Left biceps: 2+  Right triceps: 2+  Left triceps: 2+  Right patellar: 2+  Left patellar: 2+  Right achilles: 2+  Left achilles: 2+  Right : 2+  Left : 2+      Assessment/Plan   Independent Review of Radiographic Studies:      I reviewed an MRI done on 8 January of this year.  This shows some stenosis at L1-2 but is very mild.  L2-3 shows more significant stenosis with lateral recess narrowing.  L3-4 also shows severe stenosis with lateral recess narrowing and almost complete obliteration of the canal.  L4-5 shows a grade 2 spondylolisthesis with near complete obliteration of the canal and L5-S1 mostly looks okay.    Medical Decision Making:      I told the patient and her daughter about the imaging.  I told them  that I would not recommend any surgery on her as I think it would be a 3 level decompression and fusion and I think that is too big of a surgery to be doing on someone 85.  Her chances of complications would be high and her chances of a good result would be low.  I did talk about epidural blocks but currently she is really just having the pain when she is rolling over in bed and so I would tend not to do anything at all.  She agrees and will call if it gets any worse and we could consider some epidural blocks at that time.    Nyasia was seen today for back pain.    Diagnoses and all orders for this visit:    Spinal stenosis of lumbar region without neurogenic claudication      Return if symptoms worsen or fail to improve.

## 2019-02-26 ENCOUNTER — OFFICE VISIT (OUTPATIENT)
Dept: NEUROSURGERY | Facility: CLINIC | Age: 84
End: 2019-02-26

## 2019-02-26 VITALS — HEART RATE: 74 BPM | DIASTOLIC BLOOD PRESSURE: 78 MMHG | SYSTOLIC BLOOD PRESSURE: 148 MMHG

## 2019-02-26 DIAGNOSIS — M48.061 SPINAL STENOSIS OF LUMBAR REGION WITHOUT NEUROGENIC CLAUDICATION: Primary | ICD-10-CM

## 2019-02-26 PROCEDURE — 99213 OFFICE O/P EST LOW 20 MIN: CPT | Performed by: NEUROLOGICAL SURGERY

## 2019-02-26 RX ORDER — MELOXICAM 15 MG/1
TABLET ORAL
COMMUNITY
Start: 2019-02-12 | End: 2020-09-17 | Stop reason: SDUPTHER

## 2019-02-26 RX ORDER — TRAMADOL HYDROCHLORIDE 50 MG/1
TABLET ORAL
COMMUNITY
Start: 2018-12-13 | End: 2020-09-23

## 2019-10-07 ENCOUNTER — TRANSCRIBE ORDERS (OUTPATIENT)
Dept: ADMINISTRATIVE | Facility: HOSPITAL | Age: 84
End: 2019-10-07

## 2019-10-07 ENCOUNTER — LAB (OUTPATIENT)
Dept: LAB | Facility: HOSPITAL | Age: 84
End: 2019-10-07

## 2019-10-07 DIAGNOSIS — E55.9 AVITAMINOSIS D: ICD-10-CM

## 2019-10-07 DIAGNOSIS — M89.50 OSTEOLYSIS: ICD-10-CM

## 2019-10-07 DIAGNOSIS — M89.50 OSTEOLYSIS: Primary | ICD-10-CM

## 2019-10-07 LAB
25(OH)D3 SERPL-MCNC: 42.9 NG/ML (ref 30–100)
ALBUMIN SERPL-MCNC: 4.3 G/DL (ref 3.5–5.2)
ALBUMIN/GLOB SERPL: 1.7 G/DL
ALP SERPL-CCNC: 70 U/L (ref 39–117)
ALT SERPL W P-5'-P-CCNC: 15 U/L (ref 1–33)
ANION GAP SERPL CALCULATED.3IONS-SCNC: 12.4 MMOL/L (ref 5–15)
AST SERPL-CCNC: 19 U/L (ref 1–32)
BILIRUB SERPL-MCNC: 0.4 MG/DL (ref 0.2–1.2)
BUN BLD-MCNC: 20 MG/DL (ref 8–23)
BUN/CREAT SERPL: 25.6 (ref 7–25)
CALCIUM SPEC-SCNC: 9.2 MG/DL (ref 8.6–10.5)
CHLORIDE SERPL-SCNC: 100 MMOL/L (ref 98–107)
CO2 SERPL-SCNC: 28.6 MMOL/L (ref 22–29)
CREAT BLD-MCNC: 0.78 MG/DL (ref 0.57–1)
GFR SERPL CREATININE-BSD FRML MDRD: 70 ML/MIN/1.73
GLOBULIN UR ELPH-MCNC: 2.6 GM/DL
GLUCOSE BLD-MCNC: 108 MG/DL (ref 65–99)
POTASSIUM BLD-SCNC: 4.8 MMOL/L (ref 3.5–5.2)
PROT SERPL-MCNC: 6.9 G/DL (ref 6–8.5)
SODIUM BLD-SCNC: 141 MMOL/L (ref 136–145)

## 2019-10-07 PROCEDURE — 80053 COMPREHEN METABOLIC PANEL: CPT | Performed by: INTERNAL MEDICINE

## 2019-10-07 PROCEDURE — 82306 VITAMIN D 25 HYDROXY: CPT | Performed by: INTERNAL MEDICINE

## 2019-10-07 PROCEDURE — 36415 COLL VENOUS BLD VENIPUNCTURE: CPT

## 2019-10-16 ENCOUNTER — TRANSCRIBE ORDERS (OUTPATIENT)
Dept: ADMINISTRATIVE | Facility: HOSPITAL | Age: 84
End: 2019-10-16

## 2019-10-16 DIAGNOSIS — M81.0 SENILE OSTEOPOROSIS: Primary | ICD-10-CM

## 2019-10-21 RX ORDER — ZOLEDRONIC ACID 5 MG/100ML
5 INJECTION, SOLUTION INTRAVENOUS ONCE
Status: CANCELLED | OUTPATIENT
Start: 2019-10-21

## 2019-10-25 ENCOUNTER — HOSPITAL ENCOUNTER (OUTPATIENT)
Dept: INFUSION THERAPY | Facility: HOSPITAL | Age: 84
Discharge: HOME | End: 2019-10-25
Admitting: INTERNAL MEDICINE

## 2019-10-25 VITALS
RESPIRATION RATE: 20 BRPM | HEART RATE: 86 BPM | SYSTOLIC BLOOD PRESSURE: 152 MMHG | TEMPERATURE: 96.8 F | OXYGEN SATURATION: 96 % | DIASTOLIC BLOOD PRESSURE: 73 MMHG

## 2019-10-25 DIAGNOSIS — M81.0 OSTEOPOROSIS, UNSPECIFIED OSTEOPOROSIS TYPE, UNSPECIFIED PATHOLOGICAL FRACTURE PRESENCE: Primary | ICD-10-CM

## 2019-10-25 PROCEDURE — 96365 THER/PROPH/DIAG IV INF INIT: CPT

## 2019-10-25 PROCEDURE — 25010000002 ZOLEDRONIC ACID 5 MG/100ML SOLUTION: Performed by: INTERNAL MEDICINE

## 2019-10-25 PROCEDURE — 96374 THER/PROPH/DIAG INJ IV PUSH: CPT

## 2019-10-25 RX ORDER — ZOLEDRONIC ACID 5 MG/100ML
5 INJECTION, SOLUTION INTRAVENOUS ONCE
Status: COMPLETED | OUTPATIENT
Start: 2019-10-25 | End: 2019-10-25

## 2019-10-25 RX ORDER — ZOLEDRONIC ACID 5 MG/100ML
5 INJECTION, SOLUTION INTRAVENOUS ONCE
OUTPATIENT
Start: 2019-10-25

## 2019-10-25 RX ORDER — MULTIVITAMIN WITH IRON
1 TABLET ORAL 2 TIMES DAILY
COMMUNITY
End: 2021-03-13

## 2019-10-25 RX ADMIN — ZOLEDRONIC ACID 5 MG: 5 INJECTION, SOLUTION INTRAVENOUS at 12:26

## 2019-10-25 NOTE — PROGRESS NOTES
Creatinine clearance > 35 and calcium WNL. Tolerated well. Encouraged to increase po fluids over next couple of days, verbalized understanding. D/C'd per ambulation.

## 2019-10-25 NOTE — PATIENT INSTRUCTIONS
Zoledronic Acid injection (Paget's Disease, Osteoporosis)  What is this medicine?  ZOLEDRONIC ACID (FANNY javid go ik AS id) lowers the amount of calcium loss from bone. It is used to treat Paget's disease and osteoporosis in women.  This medicine may be used for other purposes; ask your health care provider or pharmacist if you have questions.  COMMON BRAND NAME(S): Reclast, Zometa  What should I tell my health care provider before I take this medicine?  They need to know if you have any of these conditions:  -aspirin-sensitive asthma  -cancer, especially if you are receiving medicines used to treat cancer  -dental disease or wear dentures  -infection  -kidney disease  -low levels of calcium in the blood  -past surgery on the parathyroid gland or intestines  -receiving corticosteroids like dexamethasone or prednisone  -an unusual or allergic reaction to zoledronic acid, other medicines, foods, dyes, or preservatives  -pregnant or trying to get pregnant  -breast-feeding  How should I use this medicine?  This medicine is for infusion into a vein. It is given by a health care professional in a hospital or clinic setting.  Talk to your pediatrician regarding the use of this medicine in children. This medicine is not approved for use in children.  Overdosage: If you think you have taken too much of this medicine contact a poison control center or emergency room at once.  NOTE: This medicine is only for you. Do not share this medicine with others.  What if I miss a dose?  It is important not to miss your dose. Call your doctor or health care professional if you are unable to keep an appointment.  What may interact with this medicine?  -certain antibiotics given by injection  -NSAIDs, medicines for pain and inflammation, like ibuprofen or naproxen  -some diuretics like bumetanide, furosemide  -teriparatide  This list may not describe all possible interactions. Give your health care provider a list of all the medicines,  herbs, non-prescription drugs, or dietary supplements you use. Also tell them if you smoke, drink alcohol, or use illegal drugs. Some items may interact with your medicine.  What should I watch for while using this medicine?  Visit your doctor or health care professional for regular checkups. It may be some time before you see the benefit from this medicine. Do not stop taking your medicine unless your doctor tells you to. Your doctor may order blood tests or other tests to see how you are doing.  Women should inform their doctor if they wish to become pregnant or think they might be pregnant. There is a potential for serious side effects to an unborn child. Talk to your health care professional or pharmacist for more information.  You should make sure that you get enough calcium and vitamin D while you are taking this medicine. Discuss the foods you eat and the vitamins you take with your health care professional.  Some people who take this medicine have severe bone, joint, and/or muscle pain. This medicine may also increase your risk for jaw problems or a broken thigh bone. Tell your doctor right away if you have severe pain in your jaw, bones, joints, or muscles. Tell your doctor if you have any pain that does not go away or that gets worse.  Tell your dentist and dental surgeon that you are taking this medicine. You should not have major dental surgery while on this medicine. See your dentist to have a dental exam and fix any dental problems before starting this medicine. Take good care of your teeth while on this medicine. Make sure you see your dentist for regular follow-up appointments.  What side effects may I notice from receiving this medicine?  Side effects that you should report to your doctor or health care professional as soon as possible:  -allergic reactions like skin rash, itching or hives, swelling of the face, lips, or tongue  -anxiety, confusion, or depression  -breathing problems  -changes in  vision  -eye pain  -feeling faint or lightheaded, falls  -jaw pain, especially after dental work  -mouth sores  -muscle cramps, stiffness, or weakness  -redness, blistering, peeling or loosening of the skin, including inside the mouth  -trouble passing urine or change in the amount of urine  Side effects that usually do not require medical attention (report to your doctor or health care professional if they continue or are bothersome):  -bone, joint, or muscle pain  -constipation  -diarrhea  -fever  -hair loss  -irritation at site where injected  -loss of appetite  -nausea, vomiting  -stomach upset  -trouble sleeping  -trouble swallowing  -weak or tired  This list may not describe all possible side effects. Call your doctor for medical advice about side effects. You may report side effects to FDA at 8-104-FDA-2912.  Where should I keep my medicine?  This drug is given in a hospital or clinic and will not be stored at home.  NOTE: This sheet is a summary. It may not cover all possible information. If you have questions about this medicine, talk to your doctor, pharmacist, or health care provider.  © 2019 [employer] (2015-05-16 14:19:57)

## 2020-03-30 ENCOUNTER — TRANSCRIBE ORDERS (OUTPATIENT)
Dept: ADMINISTRATIVE | Facility: HOSPITAL | Age: 85
End: 2020-03-30

## 2020-03-30 DIAGNOSIS — R49.0 DYSPHONIA: Primary | ICD-10-CM

## 2020-03-30 DIAGNOSIS — R47.02 DYSPHASIA: ICD-10-CM

## 2020-04-07 ENCOUNTER — APPOINTMENT (OUTPATIENT)
Dept: GENERAL RADIOLOGY | Facility: HOSPITAL | Age: 85
End: 2020-04-07

## 2020-06-02 ENCOUNTER — TRANSCRIBE ORDERS (OUTPATIENT)
Dept: ADMINISTRATIVE | Facility: HOSPITAL | Age: 85
End: 2020-06-02

## 2020-06-02 DIAGNOSIS — Z01.818 OTHER SPECIFIED PRE-OPERATIVE EXAMINATION: Primary | ICD-10-CM

## 2020-06-08 ENCOUNTER — APPOINTMENT (OUTPATIENT)
Dept: GENERAL RADIOLOGY | Facility: HOSPITAL | Age: 85
End: 2020-06-08

## 2020-06-08 ENCOUNTER — LAB (OUTPATIENT)
Dept: LAB | Facility: HOSPITAL | Age: 85
End: 2020-06-08

## 2020-06-08 DIAGNOSIS — Z01.818 OTHER SPECIFIED PRE-OPERATIVE EXAMINATION: ICD-10-CM

## 2020-06-08 PROCEDURE — U0004 COV-19 TEST NON-CDC HGH THRU: HCPCS

## 2020-06-09 LAB
REF LAB TEST METHOD: NORMAL
SARS-COV-2 RNA RESP QL NAA+PROBE: NOT DETECTED

## 2020-06-10 ENCOUNTER — HOSPITAL ENCOUNTER (OUTPATIENT)
Dept: GENERAL RADIOLOGY | Facility: HOSPITAL | Age: 85
Discharge: HOME | End: 2020-06-10
Admitting: INTERNAL MEDICINE

## 2020-06-10 DIAGNOSIS — R47.02 DYSPHASIA: ICD-10-CM

## 2020-06-10 DIAGNOSIS — R49.0 DYSPHONIA: ICD-10-CM

## 2020-06-10 DIAGNOSIS — R13.12 DYSPHAGIA, OROPHARYNGEAL PHASE: Primary | ICD-10-CM

## 2020-06-10 PROCEDURE — A9270 NON-COVERED ITEM OR SERVICE: HCPCS | Performed by: INTERNAL MEDICINE

## 2020-06-10 PROCEDURE — 63710000001 BARIUM SULFATE 98 % RECONSTITUTED SUSPENSION: Performed by: INTERNAL MEDICINE

## 2020-06-10 PROCEDURE — 63710000001 BARIUM SULFATE 40 % SUSPENSION: Performed by: INTERNAL MEDICINE

## 2020-06-10 PROCEDURE — 74230 X-RAY XM SWLNG FUNCJ C+: CPT

## 2020-06-10 PROCEDURE — 92611 MOTION FLUOROSCOPY/SWALLOW: CPT

## 2020-06-10 PROCEDURE — 63710000001 BARIUM SULFATE 40 % RECONSTITUTED SUSPENSION: Performed by: INTERNAL MEDICINE

## 2020-06-10 RX ADMIN — BARIUM SULFATE 50 ML: 400 SUSPENSION ORAL at 10:44

## 2020-06-10 RX ADMIN — BARIUM SULFATE 55 ML: 0.81 POWDER, FOR SUSPENSION ORAL at 10:44

## 2020-06-10 RX ADMIN — BARIUM SULFATE 4 ML: 980 POWDER, FOR SUSPENSION ORAL at 10:44

## 2020-06-10 NOTE — MBS/VFSS/FEES
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 Dysphagia, oropharyngeal phase R13.12 787.22   2. Dysphonia R49.0 784.42   3. Dysphasia R47.02 784.59           SLP Adult Swallow Evaluation     Row Name 06/10/20 1100       Rehab Evaluation    Document Type  evaluation  -CP    Subjective Information  no complaints  -CP    Patient Observations  alert;cooperative  -CP    Patient Effort  excellent  -CP    Symptoms Noted During/After Treatment  none  -CP       General Information    Patient Profile Reviewed  yes  -CP    Pertinent History Of Current Problem  87 y/o F present for VFSS d/t episodes of \"my coffee going down my windpipe.\" This happens rarely per the patient and her daughter's report. Of note, the patient reports progressive vocal weakness/hypophonia and changes in articulation and prosodic features of speech. Some cognitive changes also reported by daughter. The patient is currently being worked up by neurology (Dr. Song), though no diagnosis or cause has been identified. ENT w/u was essentially unremarkable per pt/family report.  -CP    Current Method of Nutrition  regular textures;thin liquids  -CP    Precautions/Limitations, Vision  WFL;for purposes of eval  -CP    Precautions/Limitations, " Hearing  WFL;for purposes of eval  -CP    Prior Level of Function-Communication  motor speech impairment  -CP    Prior Level of Function-Swallowing  no diet consistency restrictions  -CP    Plans/Goals Discussed with  patient;family;agreed upon  -CP    Barriers to Rehab  none identified ? unknown neuro dx  -CP    Patient's Goals for Discharge  patient did not state  -CP    Family Goals for Discharge  family did not state  -CP       Pain Assessment    Additional Documentation  Pain Scale: Numbers Pre/Post-Treatment (Group)  -CP       Pain Scale: Numbers Pre/Post-Treatment    Pain Scale: Numbers, Pretreatment  0/10 - no pain  -CP    Pain Scale: Numbers, Post-Treatment  0/10 - no pain  -CP       MBS/VFSS    Utensils Used  spoon;cup;straw  -CP    Consistencies Trialed  thin liquids;nectar/syrup-thick liquids;pureed;soft textures;regular textures  -CP       MBS/VFSS Interpretation    Oral Prep Phase  WFL  -CP    Oral Transit Phase  WFL  -CP    Oral Residue  WFL  -CP    VFSS Summary  Oropharyngeal swallow is WFL. Slight mistiming with thin liquids resulted in trace, shallow, transient penetration at times when taking consecutive drinks via cup or straw. No further penetration or aspiration occurred. No pharyngeal residue present with any consistencies.   -CP       SLP Communication to Radiology    Summary Statement  Oropharyngeal swallow is WFL. Slight mistiming with thin liquids resulted in trace, shallow, transient penetration at times when taking consecutive drinks via cup or straw. No further penetration or aspiration occurred. No pharyngeal residue present with any consistencies.   -CP       Clinical Impression    SLP Swallowing Diagnosis  functional oral phase;functional pharyngeal phase  -CP    Functional Impact  no impact on function  -CP    Swallow Criteria for Skilled Therapeutic Interventions Met  other (see comments) no dysphagia f/u required, but suggest voice eval/tx  -CP       Recommendations    Therapy  Frequency (Swallow)  evaluation only  -CP    SLP Diet Recommendation  regular textures;thin liquids  -CP    Recommended Diagnostics  Videostroboscopy  -CP    Recommended Precautions and Strategies  upright posture during/after eating;small bites of food and sips of liquid  -CP    SLP Rec. for Method of Medication Administration  meds whole;as tolerated  -CP    Monitor for Signs of Aspiration  yes;notify SLP if any concerns  -CP    Anticipated Dischage Disposition (SLP)  home with assist  -CP    Demonstrates Need for Referral to Another Service  speech therapy;other (see comments) for voice  -CP      User Key  (r) = Recorded By, (t) = Taken By, (c) = Cosigned By    Initials Name Provider Type    Liz Ann MS CCC-SLP Speech and Language Pathologist                        OP SLP Education     Row Name 06/10/20 1143       Education    Barriers to Learning  No barriers identified  -CP    Education Provided  Described results of evaluation;Patient expressed understanding of evaluation;Family/caregivers expressed understanding of evaluation  -CP    Assessed  Learning needs;Learning motivation;Learning preferences;Learning readiness  -CP    Learning Method  Explanation  -CP    Teaching Response  Verbalized understanding  -CP      User Key  (r) = Recorded By, (t) = Taken By, (c) = Cosigned By    Initials Name Effective Dates    CP Liz Garcia MS CCC-SLP 06/08/18 -               OP SLP Assessment/Plan - 06/10/20 1141        SLP Assessment    Functional Problems  Swallowing   -CP    Clinical Impression: Swallowing  WNL   -CP    Please refer to paper survey for additional self-reported information  No   -CP    Please refer to items scanned into chart for additional diagnostic informaiton and handouts as provided by clinician  No   -CP    SLP Diagnosis  Swallow WFL; Vocal/motor speech impairment   -CP    Prognosis  Good (comment)   -CP    Patient/caregiver participated in establishment of treatment plan and  goals  Yes   -CP    Patient would benefit from skilled therapy intervention  Yes   -CP       SLP Plan    Frequency  per treating SLP   -CP    Planned CPT's?  SLP VOICE & RESONANCE EVAL: [id number]   -CP    Expected Duration Therapy Session - minutes  45-60 minutes   -CP    Plan Comments  If ok with referring MD   -CP      User Key  (r) = Recorded By, (t) = Taken By, (c) = Cosigned By    Initials Name Provider Type    Liz Ann MS CCC-SLP Speech and Language Pathologist              SLP Outcome Measures (last 72 hours)      SLP Outcome Measures     Row Name 06/10/20 1100             SLP Outcome Measures    Outcome Measure Used?  Adult NOMS  -CP         Adult FCM Scores    FCM Chosen  Swallowing  -CP      Swallowing FCM Score  6  -CP        User Key  (r) = Recorded By, (t) = Taken By, (c) = Cosigned By    Initials Name Effective Dates    CP Liz Garcia MS CCC-SLP 06/08/18 -                Time Calculation:   SLP Start Time: 1015  SLP Stop Time: 1145  SLP Time Calculation (min): 90 min    Therapy Charges for Today     Code Description Service Date Service Provider Modifiers Qty    [id number] HC ST MOTION FLUORO EVAL SWALLOW 6 6/10/2020 Liz Garcia MS CCC-SLP GN 1                   Liz Garcia MS CCC-SLP  6/10/2020

## 2020-06-15 ENCOUNTER — TRANSCRIBE ORDERS (OUTPATIENT)
Dept: ADMINISTRATIVE | Facility: HOSPITAL | Age: 85
End: 2020-06-15

## 2020-06-15 ENCOUNTER — LAB (OUTPATIENT)
Dept: LAB | Facility: HOSPITAL | Age: 85
End: 2020-06-15

## 2020-06-15 DIAGNOSIS — H50.50 PHORIA: ICD-10-CM

## 2020-06-15 DIAGNOSIS — R53.83 OTHER FATIGUE: Primary | ICD-10-CM

## 2020-06-15 DIAGNOSIS — R53.83 OTHER FATIGUE: ICD-10-CM

## 2020-06-15 PROCEDURE — 86255 FLUORESCENT ANTIBODY SCREEN: CPT | Performed by: PSYCHIATRY & NEUROLOGY

## 2020-06-15 PROCEDURE — 36415 COLL VENOUS BLD VENIPUNCTURE: CPT

## 2020-06-15 PROCEDURE — 83519 RIA NONANTIBODY: CPT | Performed by: PSYCHIATRY & NEUROLOGY

## 2020-06-15 PROCEDURE — 84443 ASSAY THYROID STIM HORMONE: CPT | Performed by: PSYCHIATRY & NEUROLOGY

## 2020-06-15 PROCEDURE — 84436 ASSAY OF TOTAL THYROXINE: CPT | Performed by: PSYCHIATRY & NEUROLOGY

## 2020-06-15 PROCEDURE — 84480 ASSAY TRIIODOTHYRONINE (T3): CPT | Performed by: PSYCHIATRY & NEUROLOGY

## 2020-06-16 LAB
T3 SERPL-MCNC: 95.4 NG/DL (ref 80–200)
T4 SERPL-MCNC: 7.62 MCG/DL (ref 4.5–11.7)
TSH SERPL DL<=0.05 MIU/L-ACNC: 0.98 UIU/ML (ref 0.27–4.2)

## 2020-06-24 LAB
ACHR AB SER-SCNC: 0.03 NMOL/L (ref 0–0.24)
ACHR BLOCK AB/ACHR TOTAL SFR SER: 11 % (ref 0–25)
ACHR MOD AB/ACHR TOTAL SFR SER: <12 % (ref 0–20)
STRIA MUS AB TITR SER IF: NEGATIVE {TITER}

## 2020-07-16 ENCOUNTER — TRANSCRIBE ORDERS (OUTPATIENT)
Dept: SPEECH THERAPY | Facility: HOSPITAL | Age: 85
End: 2020-07-16

## 2020-07-16 DIAGNOSIS — R13.19 OTHER DYSPHAGIA: Primary | ICD-10-CM

## 2020-07-20 ENCOUNTER — HOSPITAL ENCOUNTER (OUTPATIENT)
Dept: SPEECH THERAPY | Facility: HOSPITAL | Age: 85
Setting detail: THERAPIES SERIES
Discharge: HOME | End: 2020-07-20

## 2020-07-20 DIAGNOSIS — R49.0 DYSPHONIA: Primary | ICD-10-CM

## 2020-07-20 DIAGNOSIS — R13.10 DYSPHAGIA, UNSPECIFIED TYPE: ICD-10-CM

## 2020-07-20 DIAGNOSIS — R47.1 DYSARTHRIA: ICD-10-CM

## 2020-07-20 DIAGNOSIS — R41.841 COGNITIVE COMMUNICATION DEFICIT: ICD-10-CM

## 2020-07-20 PROCEDURE — 92524 BEHAVRAL QUALIT ANALYS VOICE: CPT

## 2020-07-21 NOTE — THERAPY EVALUATION
Outpatient Speech Language Pathology   Adult Voice/Speech Language/Cognition   Initial Evaluation  [facility]     Patient Name: Nyasia Dalal  : 1933  MRN: [id number]  Today's Date: 2020         Visit Date: 2020   Patient Active Problem List   Diagnosis   • Osteoporosis   • Spinal stenosis of lumbar region without neurogenic claudication        Past Medical History:   Diagnosis Date   • Breast cancer (CMS/HCC) 2014   • Irritable bowel syndrome    • Osteoporosis    • Skin cancer         Past Surgical History:   Procedure Laterality Date   • BREAST LUMPECTOMY Left    • CATARACT EXTRACTION EXTRACAPSULAR W/ INTRAOCULAR LENS IMPLANTATION Bilateral    • COLONOSCOPY     • EYE SURGERY     • SKIN CANCER EXCISION  2012         Visit Dx:    ICD-10-CM ICD-9-CM   1. Dysphonia R49.0 784.42   2. Dysarthria R47.1 784.51   3. Dysphagia, unspecified type R13.10 787.20   4. Cognitive communication deficit R41.841 799.52        Patient was wearing a face mask during this therapy encounter. Therapist used appropriate personal protective equipment including mask, eye protection and desktop sneeze guard.  Mask used was standard procedure mask. Appropriate PPE was worn during the entire therapy session. Hand hygiene was completed before and after therapy session. Patient is not in enhanced droplet precautions.          VOICE ASSESSMENT (last 72 hours)      Voice Assessment     Row Name 20 1345          Document Type  evaluation  -HS    Total Evaluation Minutes, SLP  45  -HS    Subjective Information  no complaints  -HS    Patient Observations  alert;cooperative;agree to therapy  -HS    Patient Effort  good  -HS    Symptoms Noted During/After Treatment  none  -HS          Patient Profile Reviewed  yes  -HS    Pertinent History Of Current Problem  Patient complains of trouble swallowing solids. VFSS completed 6/10/2020 revealed a functional oropharyngeal swallow. Recommendations included a regular  diet with thin liquids. A voice evaluation was also recommended, and patient presents today for this. She reports progressive vocal hoarseness/weakness and changes in the prosodic features of speech. She also states that she â€œlost my speech about a month agoâ€. Daughter states itâ€™s been several months. When asked to explain further, patient describes word finding trouble and decreased thought organization. The patient is currently being worked up by neurology, although no definitive diagnosis per the patient and her daughter. Patient and daughter also report ENT workup was unremarkable.     -HS    Current Method of Nutrition  regular textures;thin liquids  -HS    Precautions/Limitations, Vision  WFL with corrective lenses  -HS    Precautions/Limitations, Hearing  WFL  -HS    Prior Level of Function-Communication  WFL  -HS    Prior Level of Function-Swallowing  no diet consistency restrictions;safe, efficient swallowing in all situations  -HS    Plans/Goals Discussed with  patient;family;agreed upon  -HS    Barriers to Rehab  none identified  -HS    Patient's Goals for Discharge  -- Improved voice/swallow  -HS    Family Goals for Discharge  other (see comments) etiology of symptoms  -HS          Daily Water Intake  3-4 glasses (17-32 oz.)  -HS    Daily Caffeine Intake  Coffee  -HS    Daily Alcohol Servings  0  -HS    Smoking History  Nonsmoker  -HS    Vocal Abuses  Throat clearing;Coughing;Unmanaged reflux  -HS    Environmental Issues  None reported  -HS    Reflux History  None reported  -HS    Typical Voice Usage/Activities  Uses voice for ADLs  -HS          Quality and Resonance (Voice)  Breathy;Diplophonia;Glottal scott;Fast fatigability;Asthenia;Harsh;Hoarse  -HS    Respiration/Breath Support  Other (see comments) Unable to follow directions for sustained phonation tasks  -HS    Muscle Tension Observation  Jaw;Neck;Shoulders mild  -HS    Consensus Auditory-Perceptual Evaluation of Voice (CAPE-V)  yes  -HS     Reflux Symptom Index (RSI)  yes  -HS          Overall Severity Score  54  -HS    Overall Severity  Moderately Deviant  -HS    Roughness Score  62  -HS    Roughness  Moderately Deviant  -HS    Breathiness Score  43  -HS    Breathiness  Moderately Deviant  -HS    Strain Score  20  -HS    Strain  Mildly Deviant  -HS    Pitch Score  18  -HS    Pitch  Mildly Deviant  -HS    Loudness Score  56  -HS    Breathiness  Moderately Deviant  -HS    Comments About Resonance  Normal  -HS    Additional Features  Diplophonia;Kim;Asthenia  -HS          Hoarseness or a problem with your voice  5  -HS    Clearing your throat  3  -HS    Excess throat mucous or post-nasal drip  3  -HS    Difficulty swallowing food, liquid, or pills  3  -HS    Coughing after you eat or after lying down  3  -HS    Breathing difficulties or choking episodes  5  -HS    Troublesome or annoying cough  3  -HS    Sensation of something sticking in your throat/lump in your throat  3  -HS    Heart burn, indigestion, or stomach acid coming back up  5  -HS    RSI TOTAL SCORE  33  -HS    RSI COMMENTS  Normative data suggests that a RSI of greater than or equal to 13 is clinically significant. Therefore a RSI >13 may be indicative of significant reflux disease. ? LPR.   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  -HS    Rehab Potential/Prognosis  good  -HS    SLP Criteria for Skilled Therapy Intervention  yes  -HS    Functional Impact  restrictions in personal and social life  -          SLP Voice Therapy Frequency  1 day per week  -HS    Predicted Duration Therapy Intervention (Days)  4 weeks  -HS    Recommended Diagnostics  other (see comments) Consider videostroboscopy pending voice tx outcomes  -HS    SLP Voice Recommended and Strategies  Vocal hygiene techniques;Reflux precautions  -HS    Demonstrates Need for Referral to Another Service  neurology;gastroenterology;otolaryngology  (ENT);neuropsychology services  -      User Key  (r) = Recorded By, (t) = Taken By, (c) = Cosigned By    Initials Name Effective Dates     Jean-ClaudeDorinda kay, MS CCC-SLP 06/08/18 -           SLP SLC Evaluation - 07/20/20 7463        General Information    Standardized Assessment Used  SLUMS   -HS       Motor Speech Assessment/Intervention    Motor Speech Function  mild impairment   -HS    Characteristics Consistent with Dysarthria  decreased intensity;slow rate;decreased prosody   -       Cursory Voice Assessment/Intervention    Quality and Resonance (Voice)  other (see comment)    see voice evaluation   -       Standardized Tests    Cognitive/Memory Tests  SLUMS: [facility] Mental Status Examination   -HS       SLUMS: [facility] Mental Status Examination    SLUMS Score  17   -    SLUMS Range  1-20: Dementia (High school education or higher)   -    SLUMS Comments  Completed the SLUMS as a screening tool due to the patientâ€™s complaints of word finding deficits. Patient is oriented x4. She was able to recall 4 out of 5 unrelated items after delay. Patient generated 7 category items in 1 minute, which is reduced and demonstrated decreased word retrieval/search skills. She was unable to complete simple math calculations. She could reverse a series of 3 digits, but not 4. Patient able to draw a clock and insert hour markers accurately, but unable to set the hands to the correct time. She was able to follow simple directions and demonstrated adequate visual perceptual skills. She was able to answer 2 out of 4 recall questions after listening to a story. Patient scored a 17 out of 30 which is suggestive of dementia. Would consider further assessment per neurology and/or neuropsychology.    -       SLP Clinical Impressions    SLP Diagnosis  Mild dysarthria, mild cognitive communication deficits   -[facility] Criteria for Skilled Therapy Interventions Met  other (see comments)    Focus on  voice/oral motor at this time  -HS    Functional Impact  difficulty in expressing complex messages   -HS       Recommendations    Therapy Frequency (SLP SLC)  other (see comments)    diagnostic therapy as indicated  -HS    Predicted Duration Therapy Intervention (Days)  until discharge   -HS      User Key  (r) = Recorded By, (t) = Taken By, (c) = Cosigned By    Initials Name Provider Type    HS Dorinda Klein MS CCC-SLP Speech and Language Pathologist          OP SLP Education     Row Name 07/20/20 8304       Education    Education Provided  Described results of evaluation;Patient expressed understanding of evaluation;Family/caregivers expressed understanding of evaluation;Patient participated in establishing goals and treatment plan;Family/caregivers participated in establishing goals and treatment plan;Patient requires further education on strategies, risks  -HS    Assessed  Learning needs;Learning motivation;Learning preferences;Learning readiness  -    Learning Motivation  Strong  -    Learning Method  Explanation;Written materials  -    Teaching Response  Verbalized understanding  -HS      User Key  (r) = Recorded By, (t) = Taken By, (c) = Cosigned By    Initials Name Effective Dates     Dorinda Klein MS CCC-SLP 06/08/18 -         SLP OP Goals     Row Name 07/20/20 9637          Goal Type Needed    Goal Type Needed  Voice;Other Adult Goals;Dysarthria  -HS        Dysarthria Goals     Dysarthria LTG's  Patient will be able to engage in speech at the conversational level using appropriate prosody so that speech is understood by familiar/unfamiliar listeners  -HS     Patient will be able to engage in speech at the conversational level using appropriate prosody so that speech is understood by familiar/unfamiliar listeners  without cues  -HS     Status: Patient will be able to engage in speech at the conversational level using appropriate prosody so that speech is understood by familiar/unfamiliar  listeners  New  -HS      Dysarthria STG's  Patient will improve comprehensibility of verbal messages by speaking at an appropriate vocal intensity;Patient will maximize use of phonation to improve communication skills by using high phonatory effort in phrases  -HS     Patient will improve comprehensibility of verbal messages by speaking at an appropriate vocal intensity  90%:;without cues  -HS     Status: Patient will improve comprehensibility of verbal messages by speaking at an appropriate vocal intensity  New  -HS     Patient will maximize use of phonation to improve communication skills by using high phonatory effort in phrases  90%:;without cues  -HS     Status: Patient will maximize use of phonation to improve communication skills by using high phonatory effort in phrases  New  -HS        Voice Goals    Voice LTG's  Patient will maintain a program of good vocal hygiene in all settings by developing an awareness of behaviors and lifestyle;Patient will be able to use voice in vocational and avocational activities without functional limitations due to hoarseness.  -HS     Patient will maintain a program of good vocal hygiene in all settings by developing an awareness of behaviors and lifestyle.  without cues  -HS     Status: Patient will maintain a program of good vocal hygiene in all settings by developing an awareness of behaviors and lifestyle.  New  -HS     Patient will be able to use voice in vocational and avocational activities without functional limitations due to hoarseness.  without cues  -HS     Status: Patient will be able to use voice in vocational and avocational activities without functional limitations due to hoarseness.  New  -HS     Voice STG's  Patient will reduce hyperfunctional use of voice by performing Vocal Function Exercises;Pt will demonstrate an understanding of the structures and functions of the phonatory/respiratory tract (respiration, phonation, resonance and articulation);Patient  will institute vocal hygiene technique of increasing water intake per patient report  -HS     Status: Patient will institute vocal hygiene technique of increasing water intake per patient report  New  -HS     Pt will demonstrate an understanding of the structures and functions of the phonatory/respiratory tract (respiration, phonation, resonance and articulation)  without cues  -HS     Status: Pt will demonstrate an understanding of the structures and functions of the phonatory/respiratory tract (respiration, phonation, resonance and articulation)  New  -HS     Patient will reduce hyperfunctional use of voice by performing Vocal Function Exercises  without cues  -HS     Status: Patient will reduce hyperfunctional use of voice by performing Vocal Function Exercises  New  -HS        Other Goals    Other Adult Goal- 1  Patient will institute a program of good vocal hygiene by following reflux precautions without cues.   -HS     Status: Other Adult Goal- 1  New  -HS     Comments: Other Adult Goal- 1  Provided handout on LPR. Recommended patient follow-up with PCP and/or ENT and/or GI for medical management. ST to follow for lifestyle/diet modifications.   -HS        SLP Time Calculation    SLP Goal Re-Cert Due Date  10/20/20  -HS       User Key  (r) = Recorded By, (t) = Taken By, (c) = Cosigned By    Initials Name Provider Type    HS Dorinda Klein MS CCC-SLP Speech and Language Pathologist        OP SLP Assessment/Plan - 07/20/20 1345        SLP Assessment    Functional Problems  Voice   -HS    Impact on Function- Voice  Restrictions in personal and social life   -HS    Clinical Impression- Voice  Mild moderate voice disorder   -HS    Prognosis  Good (comment)    Highly motivated, supportive family  -HS    Patient/caregiver participated in establishment of treatment plan and goals  Yes   -HS    Patient would benefit from skilled therapy intervention  Yes   -HS       SLP Plan    Frequency  1x/week   -HS    Duration   4 weeks   -HS    Planned CPT's?  SLP INDIVIDUAL SPEECH THERAPY: 38085   -HS    Expected Duration Therapy Session - minutes  30-45 minutes;45-60 minutes   -HS      User Key  (r) = Recorded By, (t) = Taken By, (c) = Cosigned By    Initials Name Provider Type    HS Dorinda Klein MS CCC-SLP Speech and Language Pathologist           SLP Outcome Measures (last 72 hours)      SLP Outcome Measures     Row Name 07/20/20 1345             SLP Outcome Measures    Outcome Measure Used?  Adult NOMS  -HS         Adult FCM Scores    FCM Chosen  Voice  -HS      Swallowing FCM Score  5  -HS        User Key  (r) = Recorded By, (t) = Taken By, (c) = Cosigned By    Initials Name Effective Dates    HS Dorinda Klein MS CCC-SLP 06/08/18 -            Time Calculation:   SLP Start Time: 1300  SLP Stop Time: 1345  SLP Time Calculation (min): 45 min    Therapy Charges for Today     Code Description Service Date Service Provider Modifiers Qty    81014317872 HC [facility] VOICE AND RESONC 3 7/20/2020 Dorinda Klein MS CCC-SLP GN 1              Dorinda Klein MS CCC-SLP  7/20/2020

## 2020-07-23 ENCOUNTER — TELEPHONE (OUTPATIENT)
Dept: NEUROSURGERY | Facility: CLINIC | Age: 85
End: 2020-07-23

## 2020-07-23 NOTE — TELEPHONE ENCOUNTER
"PATIENT CALLED REGARDING THE FOLLOWING APPOINTMENT.  Type: FOLLOW UP EX  Date: 08/14/20  Provider: CATHERINE PERRY  Caller: AUNDREA (DAUGHTER)  Phone Number: [phone]  Reason: PATIENT CURRENTLY SCHEDULED PER THEIR REQUEST. PATIENT'S DAUGHTER STATES THAT PATIENT LIFTED A CEMENT BLOCK A MONTH AGO & HAS BEEN IN PAIN EVER SINCE. PER LAST OFFICE NOTE ON 02/26/19, \"She agrees and will call if it gets any worse and we could consider some epidural blocks at that time.\" WOULD PROVIDER PREFER TO KEEP APPT AS-IS?   Availability for callback: ANYTIME  Preferred dates for scheduling: N/A        "

## 2020-07-23 NOTE — TELEPHONE ENCOUNTER
Patient was last seen 2/26/2020. She has an appointment with Marissa 8/14/2020. Should she keep this appointment or ok for DENNIS?

## 2020-07-27 ENCOUNTER — HOSPITAL ENCOUNTER (OUTPATIENT)
Dept: SPEECH THERAPY | Facility: HOSPITAL | Age: 85
Setting detail: THERAPIES SERIES
Discharge: HOME | End: 2020-07-27

## 2020-07-27 DIAGNOSIS — R41.841 COGNITIVE COMMUNICATION DEFICIT: ICD-10-CM

## 2020-07-27 DIAGNOSIS — R13.10 DYSPHAGIA, UNSPECIFIED TYPE: ICD-10-CM

## 2020-07-27 DIAGNOSIS — R49.0 DYSPHONIA: Primary | ICD-10-CM

## 2020-07-27 DIAGNOSIS — R47.1 DYSARTHRIA: ICD-10-CM

## 2020-07-27 PROCEDURE — 92507 TX SP LANG VOICE COMM INDIV: CPT

## 2020-07-27 NOTE — THERAPY TREATMENT NOTE
Outpatient Speech Language Pathology   Adult Speech Language Cognitive Treatment Note  [facility]     Patient Name: Nyasia Dalal  : 1933  MRN: [id number]  Today's Date: 2020         Visit Date: 2020   Patient Active Problem List   Diagnosis   • Osteoporosis   • Spinal stenosis of lumbar region without neurogenic claudication        Visit Dx:    ICD-10-CM ICD-9-CM   1. Dysphonia R49.0 784.42   2. Dysarthria R47.1 784.51   3. Dysphagia, unspecified type R13.10 787.20   4. Cognitive communication deficit R41.841 799.52      Patient was wearing a face mask during this therapy encounter. Therapist used appropriate personal protective equipment including mask, eye protection and desk top sneeze guard. Mask used was standard procedure mask. Appropriate PPE was worn during the entire therapy session. Hand hygiene was completed before and after therapy session. Patient is not in enhanced droplet precautions.       SLP OP Goals     Row Name 20 1400          Subjective Comments    Subjective Comments  Nyasia was pleasant and cooperative. Appeared to have reduced comprehension of exercies/information presented. Provided handouts for all information.   -HS        Subjective Pain    Able to rate subjective pain?  yes  -HS     Pre-Treatment Pain Level  0  -HS     Post-Treatment Pain Level  0  -HS        Dysarthria Goals    Patient will improve comprehensibility of verbal messages by speaking at an appropriate vocal intensity  90%:;without cues  -HS     Status: Patient will improve comprehensibility of verbal messages by speaking at an appropriate vocal intensity  Progressing as expected  -HS     Comments: Patient will improve comprehensibility of verbal messages by speaking at an appropriate vocal intensity  See below.   -HS     Patient will maximize use of phonation to improve communication skills by using high phonatory effort in phrases  90%:;without cues  -HS     Status: Patient will maximize use of  phonation to improve communication skills by using high phonatory effort in phrases  Progressing as expected  -HS     Comments: Patient will maximize use of phonation to improve communication skills by using high phonatory effort in phrases  Required min to mod cues to increase intensity during phrase productions (reading aloud).  -HS        Voice Goals    Status: Patient will institute vocal hygiene technique of increasing water intake per patient report  Progressing as expected  -HS     Comments: Patient will institute vocal hygiene technique of increasing water intake per patient report  Patient reports she has increased her water intake, however continues to report 3 glasses per day. Discussed also adding open mouth breathing of cool steam/mist (showe, facial sauna).  -HS     Pt will demonstrate an understanding of the structures and functions of the phonatory/respiratory tract (respiration, phonation, resonance and articulation)  without cues  -HS     Status: Pt will demonstrate an understanding of the structures and functions of the phonatory/respiratory tract (respiration, phonation, resonance and articulation)  Progressing as expected  -HS     Comments: Pt will demonstrate an understanding of the structures and functions of the phonatory/respiratory tract (respiration, phonation, resonance and articulation)  Discussed treating symptoms of dysarthria/dysphonia with unknown etiology. Encouraged patient to follow up with ENT/GI and neurology.   -HS     Patient will reduce hyperfunctional use of voice by performing Vocal Function Exercises  without cues  -HS     Status: Patient will reduce hyperfunctional use of voice by performing Vocal Function Exercises  Progress slower than expected  -HS     Comments: Patient will reduce hyperfunctional use of voice by performing Vocal Function Exercises  Patient required max cues and was unable to consistently perform vocal function exercises. Provided handout for home  completion. Suspect limited ability to complete independently. Would recommend cueing provided by family.   -HS        Other Goals    Other Adult Goal- 1  Patient will institute a program of good vocal hygiene by following reflux precautions without cues.   -HS     Status: Other Adult Goal- 1  Progressing as expected Ongoing  -HS     Comments: Other Adult Goal- 1  Did not formally address this session. Patient and daughter state that they have not followed up with GI, ENT, or PCP re: reflux management (medication).   -HS       User Key  (r) = Recorded By, (t) = Taken By, (c) = Cosigned By    Initials Name Provider Type    Dorinda Broussard MS CCC-SLP Speech and Language Pathologist          OP SLP Education     Row Name 07/27/20 1504       Education    Assessed  Learning motivation  -HS    Learning Motivation  Moderate  -HS    Learning Method  Demonstration;Explanation;Written materials  -HS    Teaching Response  Verbalized understanding;Demonstrated understanding;Reinforcement needed  -HS      User Key  (r) = Recorded By, (t) = Taken By, (c) = Cosigned By    Initials Name Effective Dates    Dorinda Broussard MS CCC-SLP 06/08/18 -           OP SLP Assessment/Plan - 07/27/20 1504        SLP Plan    Plan Comments  Continue with current plan of care.    -HS      User Key  (r) = Recorded By, (t) = Taken By, (c) = Cosigned By    Initials Name Provider Type    Dorinda Broussard MS CCC-SLP Speech and Language Pathologist           Time Calculation:   SLP Start Time: 1300  SLP Stop Time: 1345  SLP Time Calculation (min): 45 min    Therapy Charges for Today     Code Description Service Date Service Provider Modifiers Qty    23965506918  ST TREATMENT SPEECH 3 7/27/2020 Dorinda Klein MS CCC-SLP GN 1              MS NUSRAT Trinh  7/27/2020

## 2020-08-03 ENCOUNTER — HOSPITAL ENCOUNTER (OUTPATIENT)
Dept: SPEECH THERAPY | Facility: HOSPITAL | Age: 85
Setting detail: THERAPIES SERIES
Discharge: HOME | End: 2020-08-03

## 2020-08-03 DIAGNOSIS — R49.0 DYSPHONIA: Primary | ICD-10-CM

## 2020-08-03 DIAGNOSIS — R13.10 DYSPHAGIA, UNSPECIFIED TYPE: ICD-10-CM

## 2020-08-03 DIAGNOSIS — R47.1 DYSARTHRIA: ICD-10-CM

## 2020-08-03 PROCEDURE — 92507 TX SP LANG VOICE COMM INDIV: CPT

## 2020-08-03 NOTE — THERAPY TREATMENT NOTE
Outpatient Speech Language Pathology   Adult Speech Language Treatment Note  [facility]     Patient Name: Nyasia Dalal  : 1933  MRN: [id number]  Today's Date: 8/3/2020         Visit Date: 2020   Patient Active Problem List   Diagnosis   • Osteoporosis   • Spinal stenosis of lumbar region without neurogenic claudication          Visit Dx:    ICD-10-CM ICD-9-CM   1. Dysphonia R49.0 784.42   2. Dysarthria R47.1 784.51   3. Dysphagia, unspecified type R13.10 787.20        Patient was not wearing a face mask during this therapy encounter. Therapist used appropriate personal protective equipment including mask, eye protection and desktop sneeze guard. Mask used was standard procedure mask. Appropriate PPE was worn during the entire therapy session. Hand hygiene was completed before and after therapy session. Patient is not in enhanced droplet precautions.       SLP OP Goals     Row Name 20 1400          Subjective Comments    Subjective Comments  Nyasia was pleasant and cooperative. She demonstrated good effort during today's therapy session.   -HS        Subjective Pain    Able to rate subjective pain?  yes  -HS     Pre-Treatment Pain Level  0  -HS     Post-Treatment Pain Level  0  -HS        Dysarthria Goals    Patient will improve comprehensibility of verbal messages by speaking at an appropriate vocal intensity  90%:;without cues  -HS     Status: Patient will improve comprehensibility of verbal messages by speaking at an appropriate vocal intensity  Progressing as expected  -HS     Comments: Patient will improve comprehensibility of verbal messages by speaking at an appropriate vocal intensity  Utilized resonant voice techniques in conjunction with exercise. 80% given min to mod cues. Provided home exercises for continued practice/carryover.  -HS     Patient will maximize use of phonation to improve communication skills by using high phonatory effort in phrases  90%:;without cues  -HS      Status: Patient will maximize use of phonation to improve communication skills by using high phonatory effort in phrases  Progressing as expected  -HS     Comments: Patient will maximize use of phonation to improve communication skills by using high phonatory effort in phrases  Required min cues to increase intensity during phrase productions (reading aloud).  -HS        Voice Goals    Status: Patient will institute vocal hygiene technique of increasing water intake per patient report  Progressing as expected  -HS     Comments: Patient will institute vocal hygiene technique of increasing water intake per patient report  Patient states that she is drinking more water, however continues to report 3 glasses per day.   -HS     Pt will demonstrate an understanding of the structures and functions of the phonatory/respiratory tract (respiration, phonation, resonance and articulation)  without cues  -HS     Status: Pt will demonstrate an understanding of the structures and functions of the phonatory/respiratory tract (respiration, phonation, resonance and articulation)  Progressing as expected  -HS     Comments: Pt will demonstrate an understanding of the structures and functions of the phonatory/respiratory tract (respiration, phonation, resonance and articulation)  Discussed A&P related to resonant voice and reflux.   -HS     Patient will reduce hyperfunctional use of voice by performing Vocal Function Exercises  without cues  -HS     Status: Patient will reduce hyperfunctional use of voice by performing Vocal Function Exercises  Progress slower than expected  -HS     Comments: Patient will reduce hyperfunctional use of voice by performing Vocal Function Exercises  Patient states she has been trying VFE at home. Required moderate cues to complete this session with 75% accuracy and multiple attempts. Suspect limited carryover/ability to practice independently.  -HS        Other Goals    Other Adult Goal- 1  Patient will  institute a program of good vocal hygiene by following reflux precautions without cues.   -HS     Status: Other Adult Goal- 1  Progressing as expected Ongoing  -HS     Comments: Other Adult Goal- 1  Discussed reflux precautions including diet and lifestyle modifications. Handout provided. Patient states she has not followed up with any MDs re: reflux.    -HS       User Key  (r) = Recorded By, (t) = Taken By, (c) = Cosigned By    Initials Name Provider Type    Dorinda Broussard MS CCC-SLP Speech and Language Pathologist          OP SLP Education     Row Name 08/03/20 1528       Education    Education Provided  -- HEP  -HS    Assessed  Learning motivation  -HS    Learning Motivation  Moderate;Strong  -HS    Learning Method  Explanation;Demonstration;Written materials  -HS    Teaching Response  Verbalized understanding;Demonstrated understanding;Reinforcement needed  -HS      User Key  (r) = Recorded By, (t) = Taken By, (c) = Cosigned By    Initials Name Effective Dates    HS Dorinda Klein MS CCC-SLP 06/08/18 -           OP SLP Assessment/Plan - 08/03/20 1528        SLP Plan    Plan Comments  Continue with current plan of care. Transition to home program. Tentative discharge 8/10/2020.    -HS      User Key  (r) = Recorded By, (t) = Taken By, (c) = Cosigned By    Initials Name Provider Type    Dorinda Broussard MS CCC-SLP Speech and Language Pathologist            Time Calculation:   SLP Start Time: 1300  SLP Stop Time: 1345  SLP Time Calculation (min): 45 min    Therapy Charges for Today     Code Description Service Date Service Provider Modifiers Qty    00755641265  ST TREATMENT SPEECH 3 8/3/2020 Dorinda Klein MS CCC-SLP GN, KX 1              MS NUSRAT Trinh  8/3/2020

## 2020-08-10 ENCOUNTER — HOSPITAL ENCOUNTER (OUTPATIENT)
Dept: SPEECH THERAPY | Facility: HOSPITAL | Age: 85
Setting detail: THERAPIES SERIES
Discharge: HOME | End: 2020-08-10

## 2020-08-10 DIAGNOSIS — R13.10 DYSPHAGIA, UNSPECIFIED TYPE: ICD-10-CM

## 2020-08-10 DIAGNOSIS — R49.0 DYSPHONIA: Primary | ICD-10-CM

## 2020-08-10 DIAGNOSIS — R47.1 DYSARTHRIA: ICD-10-CM

## 2020-08-10 DIAGNOSIS — R41.841 COGNITIVE COMMUNICATION DEFICIT: ICD-10-CM

## 2020-08-10 PROCEDURE — 92507 TX SP LANG VOICE COMM INDIV: CPT

## 2020-08-11 NOTE — THERAPY DISCHARGE NOTE
Outpatient Speech Language Pathology   Adult Voice Treatment Note/Discharge Summary  [facility]     Patient Name: Nyasia Dalal  : 1933  MRN: [id number]  Today's Date: 8/10/2020           Visit Date: 08/10/2020      Patient Active Problem List   Diagnosis   • Osteoporosis   • Spinal stenosis of lumbar region without neurogenic claudication       Visit Dx:    ICD-10-CM ICD-9-CM   1. Dysphonia R49.0 784.42   2. Dysarthria R47.1 784.51   3. Dysphagia, unspecified type R13.10 787.20   4. Cognitive communication deficit R41.841 799.52     Patient was wearing a face mask during this therapy encounter. Therapist used appropriate personal protective equipment including mask, eye protection and desktop sneeze guard. Mask used was standard procedure mask. Appropriate PPE was worn during the entire therapy session. Hand hygiene was completed before and after therapy session. Patient is not in enhanced droplet precautions.           08/10/20 1300   Subjective Comments   Subjective Comments Nyasia is pleasant and cooperative. She demonstrated good effort in therapy today.    Subjective Pain   Able to rate subjective pain? yes   Pre-Treatment Pain Level 8   Post-Treatment Pain Level 8   Subjective Pain Comment Low back pain, chronic for past month. Has appointment next Friday to further assess.    Dysarthria Goals    Dysarthria LTG's Patient will be able to engage in speech at the conversational level using appropriate prosody so that speech is understood by familiar/unfamiliar listeners   Patient will be able to engage in speech at the conversational level using appropriate prosody so that speech is understood by familiar/unfamiliar listeners without cues   Status: Patient will be able to engage in speech at the conversational level using appropriate prosody so that speech is understood by familiar/unfamiliar listeners   (Not Met)    Dysarthria STG's Patient will improve comprehensibility of verbal messages by speaking  at an appropriate vocal intensity;Patient will maximize use of phonation to improve communication skills by using high phonatory effort in phrases   Patient will improve comprehensibility of verbal messages by speaking at an appropriate vocal intensity 90%:;without cues   Status: Patient will improve comprehensibility of verbal messages by speaking at an appropriate vocal intensity Progressing as expected   Comments: Patient will improve comprehensibility of verbal messages by speaking at an appropriate vocal intensity Requires consistent cues to increase phonatory effort at the phrase level. Patient states that both her son and daughter have difficulty hearing her. Discussed compensatory strategy, use of personal amplifier, as etiology of reduced vocal intensity unknown at this time.    Patient will maximize use of phonation to improve communication skills by using high phonatory effort in phrases 90%:;without cues   Status: Patient will maximize use of phonation to improve communication skills by using high phonatory effort in phrases Progressing as expected   Comments: Patient will maximize use of phonation to improve communication skills by using high phonatory effort in phrases Requires min cues to increase effort.    Comments: Patient will be able to engage in speech at the conversational level using appropriate prosody so that speech is understood by familiar/unfamiliar listeners Patient continues with mild-mod dysphonia and dysarthria characterized by reduced vocal intensity, reduced breath support, and irregular prosody.    Voice Goals   Voice LTG's Patient will maintain a program of good vocal hygiene in all settings by developing an awareness of behaviors and lifestyle;Patient will be able to use voice in vocational and avocational activities without functional limitations due to hoarseness.   Patient will maintain a program of good vocal hygiene in all settings by developing an awareness of behaviors and  lifestyle. without cues   Status: Patient will maintain a program of good vocal hygiene in all settings by developing an awareness of behaviors and lifestyle.   (Not Met)   Patient will be able to use voice in vocational and avocational activities without functional limitations due to hoarseness. without cues   Status: Patient will be able to use voice in vocational and avocational activities without functional limitations due to hoarseness.   (Not Met)   Voice STG's Patient will reduce hyperfunctional use of voice by performing Vocal Function Exercises;Pt will demonstrate an understanding of the structures and functions of the phonatory/respiratory tract (respiration, phonation, resonance and articulation);Patient will institute vocal hygiene technique of increasing water intake per patient report   Status: Patient will institute vocal hygiene technique of increasing water intake per patient report Progressing as expected   Comments: Patient will institute vocal hygiene technique of increasing water intake per patient report Patient states that she is drinking more water, however continues to report 3 glasses per day.    Pt will demonstrate an understanding of the structures and functions of the phonatory/respiratory tract (respiration, phonation, resonance and articulation) without cues   Status: Pt will demonstrate an understanding of the structures and functions of the phonatory/respiratory tract (respiration, phonation, resonance and articulation) Progressing as expected   Comments: Pt will demonstrate an understanding of the structures and functions of the phonatory/respiratory tract (respiration, phonation, resonance and articulation) Discussed difficulty treating dysphonia and dysarthria in context of unknown etiology. SLP recommended patient follow-up with MD to work on establishing an etiology.    Patient will reduce hyperfunctional use of voice by performing Vocal Function Exercises without cues   Status:  Patient will reduce hyperfunctional use of voice by performing Vocal Function Exercises Progressing as expected   Comments: Patient will reduce hyperfunctional use of voice by performing Vocal Function Exercises Patient states she has been continuing to complete her VFE at home. Required min - mod cues to complete this session with 80% accuracy and multiple attempts. Suspect limited carryover/ability to practice independently.   Comments: Patient will maintain a program of good vocal hygiene in all settings by developing an awareness of behaviors and lifestyle. Patient reported some minimal improvement in vocal hygiene.    Comments: Patient will be able to use voice in vocational and avocational activities without functional limitations due to hoarseness. Little to no progress in vocal quality or intensity. Do not feel voice tx exercises beneficial to patient at this time. Etiology of dysphonia and dysarthria unknown.    Other Goals   Other Adult Goal- 1 Patient will institute a program of good vocal hygiene by following reflux precautions without cues.    Status: Other Adult Goal- 1 Progressing as expected  (Ongoing)   Comments: Other Adult Goal- 1 Limited carryover. Patient denies reflux symptoms at this time.            OP SLP Assessment/Plan - 08/10/20 1345        SLP Assessment    Functional Problems  Voice   -HS    Impact on Function- Voice  Restrictions in personal and social life;Difficulty communicating   -HS    Clinical Impression- Voice  Mild moderate voice disorder   -HS    Prognosis  Fair (comment)    Limited progress in therapy, unknown etiology for dysarthria/dysphonia  -HS       SLP Plan    Plan Comments  Discharge speech therapy at this time. Could consider further speech therapy services once etiology determined.    -HS      User Key  (r) = Recorded By, (t) = Taken By, (c) = Cosigned By    Initials Name Provider Type    Dorinda Broussard MS CCC-SLP Speech and Language Pathologist          SLP OP  Goals     Row Name 08/10/20 1300          Subjective Comments    Subjective Comments  Nyasia is pleasant and cooperative. She demonstrated good effort in therapy today.   -HS        Subjective Pain    Able to rate subjective pain?  yes  -HS     Pre-Treatment Pain Level  8  -HS     Post-Treatment Pain Level  8  -HS     Subjective Pain Comment  Low back pain, chronic for past month. Has appointment next Friday to further assess.   -HS        Dysarthria Goals     Dysarthria LTG's  Patient will be able to engage in speech at the conversational level using appropriate prosody so that speech is understood by familiar/unfamiliar listeners  -HS     Patient will be able to engage in speech at the conversational level using appropriate prosody so that speech is understood by familiar/unfamiliar listeners  without cues  -HS     Status: Patient will be able to engage in speech at the conversational level using appropriate prosody so that speech is understood by familiar/unfamiliar listeners  -- Not Met  -HS     Comments: Patient will be able to engage in speech at the conversational level using appropriate prosody so that speech is understood by familiar/unfamiliar listeners  Patient continues with mild-mod dysphonia and dysarthria characterized by reduced vocal intensity, reduced breath support, and irregular prosody.   -HS      Dysarthria STG's  Patient will improve comprehensibility of verbal messages by speaking at an appropriate vocal intensity;Patient will maximize use of phonation to improve communication skills by using high phonatory effort in phrases  -HS     Patient will improve comprehensibility of verbal messages by speaking at an appropriate vocal intensity  90%:;without cues  -HS     Status: Patient will improve comprehensibility of verbal messages by speaking at an appropriate vocal intensity  Progressing as expected  -HS     Comments: Patient will improve comprehensibility of verbal messages by speaking at an  appropriate vocal intensity  Requires consistent cues to increase phonatory effort at the phrase level. Patient states that both her son and daughter have difficulty hearing her. Discussed compensatory strategy, use of personal amplifier, as etiology of reduced vocal intensity unknown at this time.   -HS     Patient will maximize use of phonation to improve communication skills by using high phonatory effort in phrases  90%:;without cues  -HS     Status: Patient will maximize use of phonation to improve communication skills by using high phonatory effort in phrases  Progressing as expected  -HS     Comments: Patient will maximize use of phonation to improve communication skills by using high phonatory effort in phrases  Requires min cues to increase effort.   -HS        Voice Goals    Voice LTG's  Patient will maintain a program of good vocal hygiene in all settings by developing an awareness of behaviors and lifestyle;Patient will be able to use voice in vocational and avocational activities without functional limitations due to hoarseness.  -HS     Patient will maintain a program of good vocal hygiene in all settings by developing an awareness of behaviors and lifestyle.  without cues  -HS     Status: Patient will maintain a program of good vocal hygiene in all settings by developing an awareness of behaviors and lifestyle.  -- Not Met  -HS     Comments: Patient will maintain a program of good vocal hygiene in all settings by developing an awareness of behaviors and lifestyle.  Patient reported some minimal improvement in vocal hygiene.   -HS     Patient will be able to use voice in vocational and avocational activities without functional limitations due to hoarseness.  without cues  -HS     Status: Patient will be able to use voice in vocational and avocational activities without functional limitations due to hoarseness.  -- Not Met  -HS     Comments: Patient will be able to use voice in vocational and  avocational activities without functional limitations due to hoarseness.  Little to no progress in vocal quality or intensity. Do not feel voice tx exercises beneficial to patient at this time. Etiology of dysphonia and dysarthria unknown.   -HS     Voice STG's  Patient will reduce hyperfunctional use of voice by performing Vocal Function Exercises;Pt will demonstrate an understanding of the structures and functions of the phonatory/respiratory tract (respiration, phonation, resonance and articulation);Patient will institute vocal hygiene technique of increasing water intake per patient report  -HS     Status: Patient will institute vocal hygiene technique of increasing water intake per patient report  Progressing as expected  -HS     Comments: Patient will institute vocal hygiene technique of increasing water intake per patient report  Patient states that she is drinking more water, however continues to report 3 glasses per day.   -HS     Pt will demonstrate an understanding of the structures and functions of the phonatory/respiratory tract (respiration, phonation, resonance and articulation)  without cues  -HS     Status: Pt will demonstrate an understanding of the structures and functions of the phonatory/respiratory tract (respiration, phonation, resonance and articulation)  Progressing as expected  -HS     Comments: Pt will demonstrate an understanding of the structures and functions of the phonatory/respiratory tract (respiration, phonation, resonance and articulation)  Discussed difficulty treating dysphonia and dysarthria in context of unknown etiology. SLP recommended patient follow-up with MD to work on establishing an etiology.   -HS     Patient will reduce hyperfunctional use of voice by performing Vocal Function Exercises  without cues  -HS     Status: Patient will reduce hyperfunctional use of voice by performing Vocal Function Exercises  Progressing as expected  -HS     Comments: Patient will reduce  hyperfunctional use of voice by performing Vocal Function Exercises  Patient states she has been continuing to complete her VFE at home. Required min - mod cues to complete this session with 80% accuracy and multiple attempts. Suspect limited carryover/ability to practice independently.  -HS        Other Goals    Other Adult Goal- 1  Patient will institute a program of good vocal hygiene by following reflux precautions without cues.   -HS     Status: Other Adult Goal- 1  Progressing as expected Ongoing  -HS     Comments: Other Adult Goal- 1  Limited carryover. Patient denies reflux symptoms at this time.   -HS       User Key  (r) = Recorded By, (t) = Taken By, (c) = Cosigned By    Initials Name Provider Type     Jean-ClaudekatjaDorinda casper MS CCC-SLP Speech and Language Pathologist          OP SLP Education     Row Name 08/10/20 1489       Education    Education Provided  -- Home exercises, vocal hygiene, need for further work-up  -HS    Assessed  Learning motivation  -HS    Learning Motivation  Strong;Moderate  -HS    Learning Method  Explanation;Demonstration;Written materials  -HS    Teaching Response  Verbalized understanding;Demonstrated understanding  -HS      User Key  (r) = Recorded By, (t) = Taken By, (c) = Cosigned By    Initials Name Effective Dates     Dorinda Klein MS CCC-SLP 06/08/18 -              SLP Outcome Measures (last 72 hours)      SLP Outcome Measures     Row Name 08/10/20 8255             SLP Outcome Measures    Outcome Measure Used?  Adult NOMS  -HS         Adult FCM Scores    FCM Chosen  Voice  -HS      Swallowing FCM Score  5  -HS        User Key  (r) = Recorded By, (t) = Taken By, (c) = Cosigned By    Initials Name Effective Dates     Dorinda Klein MS CCC-SLP 06/08/18 -           Time Calculation:   SLP Start Time: 1300  SLP Stop Time: 1345  SLP Time Calculation (min): 45 min    Therapy Charges for Today     Code Description Service Date Service Provider Modifiers Qty    52922686541   ST TREATMENT SPEECH 3 8/10/2020 Dorinda Klein MS CCC-SLP GN 1             OP SLP Discharge Summary  Date of Discharge: 08/10/20  Reason for Discharge: no further expectation of functional progress  Progress Toward Achieving Short/long Term Goals: goals not met within established timelines, unable to make functional progress at this time  Discharge Destination: home      Dorinda Klein MS CCC-SLP  8/10/2020

## 2020-08-12 NOTE — PROGRESS NOTES
"Subjective   History of Present Illness: Nyasia Dalal is a 86 y.o. female is here today for follow-up. Patient was seen last in the office 2/26/19 for back pain. Getting epidurals was discussed, surgery was not suggested .  She decided to hold off on treatment at that time.     Patients daughter called 7/23/20 after patient lifted a cement block and injured her back. She complains of bilateral back pain no leg pain.  She takes Aleve 440 mg BID for pain.     History of Present Illness  She says since the lifting injury she has noticed weakness in her legs. She denies numbness, tingling in the legs or loss of bowel or bladder control. Changing positions aggravates the back pain. She denies any other injury. She says this pain is worse than the pain she had last year when she was seen.   The following portions of the patient's history were reviewed and updated as appropriate: allergies, current medications, past family history, past medical history, past social history, past surgical history and problem list.    Review of Systems   Constitutional: Negative for chills.   HENT: Negative for congestion and sore throat.    Eyes: Negative for visual disturbance.   Musculoskeletal: Positive for back pain.   Neurological: Positive for weakness. Negative for numbness.   All other systems reviewed and are negative.      Objective     ./79   Pulse 75   Temp 97.9 °F (36.6 °C)   Ht 156.2 cm (61.5\")   Wt 59 kg (130 lb)   BMI 24.17 kg/m²    Body mass index is 24.17 kg/m².      Physical Exam   Constitutional: She is oriented to person, place, and time. She appears well-developed and well-nourished.   HENT:   Head: Normocephalic.   Eyes: Pupils are equal, round, and reactive to light. EOM are normal.   Neck: Normal range of motion.   Cardiovascular: Normal rate.   Pulmonary/Chest: Effort normal.   Musculoskeletal: Normal range of motion.   Neurological: She is alert and oriented to person, place, and time. She has normal " strength.   Skin: Skin is warm and dry.   Psychiatric: She has a normal mood and affect. Her speech is normal.   Vitals reviewed.    Neurologic Exam     Mental Status   Oriented to person, place, and time.   Speech: speech is normal   Level of consciousness: alert    Cranial Nerves     CN III, IV, VI   Pupils are equal, round, and reactive to light.  Extraocular motions are normal.     Motor Exam   Muscle bulk: normal  Overall muscle tone: normal    Strength   Strength 5/5 throughout.     Sensory Exam   Light touch normal.     Gait, Coordination, and Reflexes     Gait  Gait: (Stooped using a walker)    Mild pain with deep palpation of midline low  lumbar spine  SLRT Left negative, Right negative   Alex's Test negative bilaterally       Assessment/Plan   Independent Review of Radiographic Studies:      I personally reviewed the images from the following studies.  No recent imaging    Medical Decision Making:      She has had pain since the lifting injury in the low back.  She denies any leg pain but says she has leg weakness.  I do not detect any leg weakness on exam.  She denies any numbness tingling in the legs or any bowel or bladder dysfunction.  She did had fairly significant stenosis on her last MRI but does not describe any neurogenic claudication.  This situation we will get a lumbar Xray to make sure she did not suffer any vertebral fractures.  We will go ahead and proceed with the pain management referral that was discussed at her last visit to help with the back pain.  Her daughter accompanies her today and they both agree with the plan.   We will call with any abnormal findings on lumbar x-ray.      Addendum: Lumbar x-ray 8/17/2020 did reveal an L3 vertebral body compression fracture with 30% loss of height.  I did discuss this with her daughter on the phone 8/19/2020.  We will get a lumbar MRI to evaluate the acuity of the fracture.  She does have an appointment with pain management specialist second  week of September.  Advised her to take the lumbar MRI with her and let the pain management specialist decide if proceeding with the epidural injection is advisable.  He also follow-up in 6 weeks with a lumbar plain film to evaluate for healing.  Nyasia was seen today for back pain.    Diagnoses and all orders for this visit:    Acute midline low back pain without sciatica  -     XR Spine Lumbar 2 or 3 View; Future  -     Ambulatory Referral to Pain Management    Spinal stenosis of lumbar region without neurogenic claudication  -     Ambulatory Referral to Pain Management      Return in about 8 weeks (around 10/12/2020) for After radiographic tests.

## 2020-08-14 ENCOUNTER — HOSPITAL ENCOUNTER (OUTPATIENT)
Dept: GENERAL RADIOLOGY | Facility: HOSPITAL | Age: 85
Discharge: HOME | End: 2020-08-14
Admitting: NURSE PRACTITIONER

## 2020-08-14 ENCOUNTER — OFFICE VISIT (OUTPATIENT)
Dept: NEUROSURGERY | Facility: CLINIC | Age: 85
End: 2020-08-14

## 2020-08-14 VITALS
SYSTOLIC BLOOD PRESSURE: 148 MMHG | DIASTOLIC BLOOD PRESSURE: 79 MMHG | HEIGHT: 61 IN | HEART RATE: 75 BPM | BODY MASS INDEX: 24.55 KG/M2 | TEMPERATURE: 97.9 F | WEIGHT: 130 LBS

## 2020-08-14 DIAGNOSIS — M48.061 SPINAL STENOSIS OF LUMBAR REGION WITHOUT NEUROGENIC CLAUDICATION: ICD-10-CM

## 2020-08-14 DIAGNOSIS — M54.50 ACUTE MIDLINE LOW BACK PAIN WITHOUT SCIATICA: ICD-10-CM

## 2020-08-14 DIAGNOSIS — M54.50 ACUTE MIDLINE LOW BACK PAIN WITHOUT SCIATICA: Primary | ICD-10-CM

## 2020-08-14 PROCEDURE — 99213 OFFICE O/P EST LOW 20 MIN: CPT | Performed by: NURSE PRACTITIONER

## 2020-08-14 PROCEDURE — 72100 X-RAY EXAM L-S SPINE 2/3 VWS: CPT

## 2020-08-17 ENCOUNTER — APPOINTMENT (OUTPATIENT)
Dept: SPEECH THERAPY | Facility: HOSPITAL | Age: 85
End: 2020-08-17

## 2020-08-19 DIAGNOSIS — S32.000A COMPRESSION FRACTURE OF LUMBAR VERTEBRA, INITIAL ENCOUNTER, UNSPECIFIED LUMBAR VERTEBRAL LEVEL: Primary | ICD-10-CM

## 2020-08-24 ENCOUNTER — APPOINTMENT (OUTPATIENT)
Dept: SPEECH THERAPY | Facility: HOSPITAL | Age: 85
End: 2020-08-24

## 2020-08-31 ENCOUNTER — APPOINTMENT (OUTPATIENT)
Dept: SPEECH THERAPY | Facility: HOSPITAL | Age: 85
End: 2020-08-31

## 2020-09-14 ENCOUNTER — HOSPITAL ENCOUNTER (OUTPATIENT)
Dept: MRI IMAGING | Facility: HOSPITAL | Age: 85
Discharge: HOME | End: 2020-09-14

## 2020-09-14 ENCOUNTER — APPOINTMENT (OUTPATIENT)
Dept: SPEECH THERAPY | Facility: HOSPITAL | Age: 85
End: 2020-09-14

## 2020-09-14 ENCOUNTER — HOSPITAL ENCOUNTER (OUTPATIENT)
Dept: GENERAL RADIOLOGY | Facility: HOSPITAL | Age: 85
Discharge: HOME | End: 2020-09-14

## 2020-09-14 DIAGNOSIS — S32.000A COMPRESSION FRACTURE OF LUMBAR VERTEBRA, INITIAL ENCOUNTER, UNSPECIFIED LUMBAR VERTEBRAL LEVEL: ICD-10-CM

## 2020-09-14 PROCEDURE — 72148 MRI LUMBAR SPINE W/O DYE: CPT

## 2020-09-14 PROCEDURE — 72100 X-RAY EXAM L-S SPINE 2/3 VWS: CPT

## 2020-09-15 NOTE — PROGRESS NOTES
The patient has a pain history of the following:  Spinal stenosis  Low back pain  L3 vertebral compression fracture    Previous interventions that the patient has received include:   None    Pain medications include:  Aleve   Meloxicam    Previously: Tramadol (not on DIGNA)    Other conservative modalities which the patient reports using include:  Physical Therapy: yes a few years ago   Ice pack - does not help     Past Significant Surgical History:  None    HPI:       CHIEF COMPLAINT: Back Pain      Nyasia Dalal is a 87 y.o. female referred here by EHSAN Quesada. Nyasia Dalal presents to the office for evaluation and treatment of Back Pain      Onset:  June 2020  Inciting Event:  Lifting a cement block  Location:  Low back  Pain: Pain described as ache, dull, throbbing. Located in the low back and does not radiate into the legs, but does sometimes radiate into the buttock.  Severity:  Pain rated as a 7 /10.  Apportions pain as 100%  back pain and 0% extremity pain.  Symptoms have been episodic.  Exacerbation:  Getting up and sitting down, bending forward.   Alleviation:  Better when lying flat.  Associated Symptoms:   She denies any new onset of bowel or bladder weakness, significant leg weakness or saddle anesthesia. Denies balance problems or lower extremity incoordination.  Ambulates: Without assistive device  Limitations: This pain limits the patient from bending forward  Goals: Functional goals include ability to bend forward and resume her normal activity.      She takes Aleve for her pain which improves her pain. She has had PT and saw and chiropractor  in the past but nothing recently. She uses ice therapy as well.     Her daughter is present with her for the visit today to help her communicate.        PEG Assessment   What number best describes your pain on average in the past week?5  What number best describes how, during the past week, pain has interfered with your enjoyment of life?5  What  "number best describes how, during the past week, pain has interfered with your general activity?  5        Current Outpatient Medications:   •  Lactobacillus (ACIDOPHILUS) capsule, Take 1 capsule by mouth., Disp: , Rfl:   •  MULTIPLE MINERALS PO, Take 1 tablet by mouth., Disp: , Rfl:   •  Multiple Vitamins-Minerals (HAIR SKIN AND NAILS FORMULA) tablet, Take  by mouth., Disp: , Rfl:   •  Omega-3 Fatty Acids (FISH OIL) 500 MG capsule, Take 1 capsule by mouth., Disp: , Rfl:   •  Polyethyl Glycol-Propyl Glycol (SYSTANE OP), Apply 1 drop to eye(s) as directed by provider 2 (Two) Times a Day. Both eyes, Disp: , Rfl:   •  Unable to find, Administer 1 each to both eyes Every Night. \"thera tears\", Disp: , Rfl:   •  zoledronic acid (RECLAST) 5 MG/100ML solution infusion, Infuse 5 mg into a venous catheter 1 (One) Time., Disp: , Rfl:   •  conjugated estrogens (PREMARIN) 0.625 MG/GM vaginal cream, Insert 0.25 applicators into the vagina., Disp: , Rfl:   •  Magnesium 250 MG tablet, Take 1 tablet by mouth 2 (Two) Times a Day., Disp: , Rfl:   •  meloxicam (MOBIC) 15 MG tablet, Take 1 tablet by mouth Daily., Disp: 30 tablet, Rfl: 1  •  Oxyquinolone Sulfate (TRIMO-JACKSON) 0.025 % gel, USE WITH EACH PESSARY CHANGE, Disp: , Rfl:   •  traMADol (ULTRAM) 50 MG tablet, , Disp: , Rfl:     The following portions of the patient's history were reviewed and updated as appropriate: allergies, current medications, past family history, past medical history, past social history, past surgical history and problem list.      REVIEW OF PERTINENT MEDICAL DATA    9/14/2020 Lumbar MRI: FINDINGS:     There are findings consistent with an acute to subacute compression  fracture at the L3 level with approximately 30% to 40% loss of vertebral  body height within the maximally compressed portion of the vertebra.  Marrow edema is noted throughout the upper aspect of the L3 vertebral  body compatible with this acute to subacute nature. This vertebral body  height " loss is new when compared to the prior study of 01/08/2019.      The conus medullaris terminates at the level of the mid body of L1 and  has normal signal intensity. The visualized distal thoracic spinal cord  is unremarkable.     At T12-L1, there is a disc bulge which mildly indents the ventral  subarachnoid space.     At L1-2, again a disc bulge is identified which mildly indents the  ventral subarachnoid space. There is ligamentum flavum thickening and  facet hypertrophic change in addition to the disc bulge which results in  a mild degree of canal stenosis. A similar degree of canal stenosis was  identified on the prior exam.     At the L2-3 level, there is severe central canal stenosis secondary to  disc bulging, ligament flavum thickening, and facet arthropathy. The  canal stenosis at the L2-3 level has progressed from the prior  examination where there was a moderate degree of canal stenosis. This  interval worsening is due to a more prominent disc bulge at this level.  There is a new annular fissure at the L2-3 level. There is a  mild-to-moderate degree of left and a moderate degree of right foraminal  narrowing is identified which has also progressed since the prior exam.  On the previous study, there is only a relatively mild degree of  foraminal stenosis.     At the L3-4 level, severe canal stenosis is identified secondary to disc  bulging, ligamentum thickening, and facet hypertrophic change. A similar  degree of canal stenosis was noted on the prior exam. There is mild left  and mild-to-moderate right foraminal narrowing secondary to disc bulging  and facet hypertrophic change. When compared to the prior study, a very  similar degree of canal or foraminal stenosis was seen.     At L4-5, there is anterior spondylolisthesis of L4 on L5 by  approximately 9 mm. There is moderate to severe bilateral foraminal  stenosis secondary to unroofed bulging disc material and facet  hypertrophic change. Severe central  canal stenosis is identified  secondary to disc bulging, ligamentum flavum thickening, and severe  facet hypertrophic change. Overall, the pars interarticularis at the L4  level are not well seen and integrity cannot be definitively assessed  although I do suspect that the pars interarticularis are intact  bilaterally at the L4 level. Overall, no significant interval change is  seen when compared to the prior examination of 01/08/2019.     At L5-S1, there is anterior spondylolisthesis of L5 on S1 by  approximately 3 mm. There is bulging disc material and facet  hypertrophic change that results in a mild degree of bilateral foraminal  narrowing. Again, very similar findings are noted on the prior study.     There is a deformity within the anterior cortex of the S3 segment of the  sacrum. These findings were also noted on the prior examination and the  findings are felt to most likely represent a chronic fracture site.     IMPRESSION:     There are findings compatible with an acute to subacute compression  fracture at the L3 level with approximately 30% to 40% loss of vertebral  body height within the maximally compressed portion of the vertebra.  This loss of vertebral body height is new when compared to the prior  exam 01/08/2019.     At the L2-3 level, there is severe central canal stenosis secondary to  disc bulging, ligamentum flavum thickening, and facet arthropathy. This  canal stenosis has significantly progressed since the prior exam due to  a greater degree of bulging disc material. A new posterior annular  fissure is identified at the L2-3 level. There is a mild-to-moderate  degree of left and a moderate degree of right L2-3 foraminal stenosis  which is also progressed in the interval.     At the L3-4 level, stable severe central canal stenosis is identified  secondary to disc bulging, ligamentum flavum thickening, and facet  hypertrophic change. At the L4-5 level, there is also stable severe  central canal  stenosis secondary to disc bulging, ligamentum flavum  thickening, and facet hypertrophic change. There is anterior  spondylolisthesis of L4 and L5 by approximately 9 mm which is also  stable. Overall, the pars interarticularis at the L4 level are not well  seen and their integrity is not definitively assessed although I do  suspect that they are intact.     The remaining multilevel degenerative phenomena within the lumbar spine  are also stable and discussed in detail above.     Incidental note is made of a deformity within the anterior cortex of S3,  which I suspect is representative of a chronic fracture site. Similar  findings were noted on the prior examination.    9/14/2020 Lumbar x-ray: HISTORY: Follow-up of compression fracture.     There is osteoporosis with moderate compression of the superior endplate  of L3 measuring approximately 30% and this shows no change from  08/14/2020. There is also some minimal compression of the superior end  plate of L2 which is unchanged. There is severe disc space narrowing at  L4-5 associated with facet spurring and anterior subluxation of L4  measuring 11 mm that is unchanged. There is also very severe disc space  narrowing at L5-S1. No new abnormality is seen.    10/4/2019 Creatinine 0.78    9/17/2018 Platelets 316 (10*3)      Review of Systems   Constitutional: Positive for activity change (decreased) and fatigue. Negative for chills and fever.   HENT: Negative for congestion.    Eyes: Negative for visual disturbance.   Respiratory: Negative for chest tightness and shortness of breath.    Cardiovascular: Negative for chest pain.   Gastrointestinal: Positive for abdominal pain. Negative for constipation and diarrhea.   Genitourinary: Negative for difficulty urinating, dyspareunia and dysuria.   Musculoskeletal: Positive for back pain.   Neurological: Positive for weakness (bilateral legs). Negative for dizziness, light-headedness, numbness and headaches.  "  Psychiatric/Behavioral: Positive for agitation. Negative for sleep disturbance. The patient is not nervous/anxious.      I have reviewed and confirmed the accuracy of the ROS as documented by the MA/LPN/RN Laure Dang MD      Vitals:    09/17/20 1428   BP: 154/89   Pulse: 77   Resp: 16   Temp: 96.9 °F (36.1 °C)   SpO2: 95%   Weight: 58.4 kg (128 lb 12.8 oz)   Height: 156.2 cm (61.5\")   PainSc:   7   PainLoc: Back         Objective   Physical Exam  Vitals signs reviewed.   Constitutional:       Appearance: She is normal weight.   HENT:      Head: Normocephalic.      Ears:      Comments: Hearing normal  Eyes:      General: No scleral icterus.     Conjunctiva/sclera: Conjunctivae normal.   Cardiovascular:      Rate and Rhythm: Normal rate and regular rhythm.   Pulmonary:      Effort: Pulmonary effort is normal. No respiratory distress.   Musculoskeletal:      Comments: Ambulation: Without assistive device.  Slow.   Lumbar Exam:  Appearance: Scoliotic curve absent and scarring absent  Palpated over spinous processes in the lumbar region without pain.   Palpated over lumbosacral paravertebral regions and transverse processes with positive tenderness appreciated, Bilateral.   Sacroiliac joints are not tender, Bilateral.  Trochanteric bursa are not tender, Bilateral.  Straight leg raise is negative radiculopathy, Bilateral.  Slump test is negative  radiculopathy, Bilateral.  Facet loading is positive for pain, Bilateral.  Paraspinal/adjacent lumbar musculature are not tender to palpation, Bilateral.  Alex Ritchie's test is negative sacroiliac pain, Bilateral.   Skin:     General: Skin is warm and dry.   Neurological:      General: No focal deficit present.      Mental Status: She is alert.      Sensory: No sensory deficit.      Comments: 2+ bilateral patellar, biceps, and brachioradialis reflexes, negative Jarvis's sign bilaterally.   Psychiatric:         Mood and Affect: Mood normal.         Behavior: Behavior " normal.         Thought Content: Thought content normal.         Assessment/Plan   Problems Addressed this Visit        Musculoskeletal and Integument    Osteoporosis    Relevant Medications    meloxicam (MOBIC) 15 MG tablet    Other Relevant Orders    Back Brace       Other    Spinal stenosis of lumbar region without neurogenic claudication - Primary    Relevant Orders    Back Brace      Other Visit Diagnoses     Closed compression fracture of L3 lumbar vertebra with delayed healing, subsequent encounter        Relevant Medications    meloxicam (MOBIC) 15 MG tablet    Other Relevant Orders    Back Brace    Chronic bilateral low back pain without sciatica        Relevant Orders    Back Brace          - Baseline urine drug screen was obtained.  - Pertinent labs reviewed.   - Pertinent imaging reviewed.   - Discussed treatment options and pain pathologies.  She denies pain with palpation and percussion over the L3 spinous process.  Her pain is located over her bilateral buttocks.  She states that it is well-controlled with NSAIDs.   - I discussed epidural steroid injection and risks.  She would like to wait on scheduling the procedure and try more conservative therapies first.   - Back brace prescribed to help with pain during strenuous activities.   - Meloxicam prescription provided.  Advised to stop other NSAIDs when taking this medication.  Discussed medication with the patient.  Included in this discussion was the potential for side effects and adverse events.  Patient verbalized understanding and wished to proceed.  Prescription will be sent to pharmacy.    --- Follow-up prn            DIGNA REPORT    DIGNA report has been reviewed and scanned into the patient's chart.    As the clinician, I personally reviewed the DIGNA from 9/15/2020 while the patient was in the office today.    While examining this patient, I wore protective equipment including a mask, face shield and gloves.  I washed my hands before and  after this patient encounter.  The patient wore a mask throughout the visit as well.     Laure Dang MD  Pain Management

## 2020-09-17 ENCOUNTER — OFFICE VISIT (OUTPATIENT)
Dept: PAIN MEDICINE | Facility: CLINIC | Age: 85
End: 2020-09-17

## 2020-09-17 VITALS
OXYGEN SATURATION: 95 % | TEMPERATURE: 96.9 F | WEIGHT: 128.8 LBS | SYSTOLIC BLOOD PRESSURE: 154 MMHG | DIASTOLIC BLOOD PRESSURE: 89 MMHG | HEIGHT: 62 IN | RESPIRATION RATE: 16 BRPM | HEART RATE: 77 BPM | BODY MASS INDEX: 23.7 KG/M2

## 2020-09-17 DIAGNOSIS — M48.061 SPINAL STENOSIS OF LUMBAR REGION WITHOUT NEUROGENIC CLAUDICATION: Primary | ICD-10-CM

## 2020-09-17 DIAGNOSIS — G89.29 CHRONIC BILATERAL LOW BACK PAIN WITHOUT SCIATICA: ICD-10-CM

## 2020-09-17 DIAGNOSIS — S32.030G CLOSED COMPRESSION FRACTURE OF L3 LUMBAR VERTEBRA WITH DELAYED HEALING, SUBSEQUENT ENCOUNTER: ICD-10-CM

## 2020-09-17 DIAGNOSIS — M81.0 OSTEOPOROSIS, UNSPECIFIED OSTEOPOROSIS TYPE, UNSPECIFIED PATHOLOGICAL FRACTURE PRESENCE: ICD-10-CM

## 2020-09-17 DIAGNOSIS — M54.50 CHRONIC BILATERAL LOW BACK PAIN WITHOUT SCIATICA: ICD-10-CM

## 2020-09-17 PROCEDURE — 99204 OFFICE O/P NEW MOD 45 MIN: CPT | Performed by: ANESTHESIOLOGY

## 2020-09-17 RX ORDER — MELOXICAM 15 MG/1
15 TABLET ORAL DAILY
Qty: 30 TABLET | Refills: 1 | Status: SHIPPED | OUTPATIENT
Start: 2020-09-17 | End: 2020-11-02

## 2020-09-17 NOTE — PATIENT INSTRUCTIONS
When taking Meloxicam, DO NOT TAKE ADVIL (IBUPROFEN) OR ALEVE (NAPROXEN).  You can take Tylenol if needed    Spinal Compression Fracture    A spinal compression fracture is a collapse of the bones that form the spine (vertebrae). With this type of fracture, the vertebrae become pushed (compressed) into a wedge shape. Most compression fractures happen in the middle or lower part of the spine.  What are the causes?  This condition may be caused by:  · Thinning and loss of density in the bones (osteoporosis). This is the most common cause.  · A fall.  · A car or motorcycle accident.  · Cancer.  · Trauma, such as a heavy, direct hit to the head or back.  What increases the risk?  You are more likely to develop this condition if:  · You are 60 years or older.  · You have osteoporosis.  · You have certain types of cancer, including:  ? Multiple myeloma.  ? Lymphoma.  ? Prostate cancer.  ? Lung cancer.  ? Breast cancer.  What are the signs or symptoms?  Symptoms of this condition include:  · Severe pain.  · Pain that gets worse over time.  · Pain that is worse when you stand, walk, sit, or bend.  · Sudden pain that is so bad that it is hard for you to move.  · Bending or humping of the spine.  · Gradual loss of height.  · Numbness, tingling, or weakness in the back and legs.  · Trouble walking.  Your symptoms will depend on the cause of the fracture and how quickly it develops.  How is this diagnosed?  This condition may be diagnosed based on symptoms, medical history, and a physical exam. During the physical exam, your health care provider may tap along the length of your spine to check for tenderness. Tests may be done to confirm the diagnosis. They may include:  · A bone mineral density test to check for osteoporosis.  · Imaging tests, such as a spine X-ray, CT scan, or MRI.  How is this treated?  Treatment for this condition depends on the cause and severity of the condition. Some fractures may heal on their own with  supportive care. Treatment may include:  · Pain medicine.  · Rest.  · A back brace.  · Physical therapy exercises.  · Medicine to strengthen bone.  · Calcium and vitamin D supplements.  Fractures that cause the back to become misshapen, cause nerve pain or weakness, or do not respond to other treatment may be treated with surgery. This may include:  · Vertebroplasty. Bone cement is injected into the collapsed vertebrae to stabilize them.  · Balloon kyphoplasty. The collapsed vertebrae are expanded with a balloon and then bone cement is injected into them.  · Spinal fusion. The collapsed vertebrae are connected (fused) to normal vertebrae.  Follow these instructions at home:  Medicines  · Take over-the-counter and prescription medicines only as told by your health care provider.  · Do not drive or operate heavy machinery while taking prescription pain medicine.  · If you are taking prescription pain medicine, take actions to prevent or treat constipation. Your health care provider may recommend that you:  ? Drink enough fluid to keep your urine pale yellow.  ? Eat foods that are high in fiber, such as fresh fruits and vegetables, whole grains, and beans.  ? Limit foods that are high in fat and processed sugars, such as fried or sweet foods.  ? Take an over-the-counter or prescription medicine for constipation.  If you have a brace:  · Wear the brace as told by your health care provider. Remove it only as told by your health care provider.  · Loosen the brace if your fingers or toes tingle, become numb, or turn cold and blue.  · Keep the brace clean.  · If the brace is not waterproof:  ? Do not let it get wet.  ? Cover it with a watertight covering when you take a bath or a shower.  Managing pain, stiffness, and swelling    · If directed, apply ice to the injured area:  ? If you have a removable brace, remove it as told by your health care provider.  ? Put ice in a plastic bag.  ? Place a towel between your skin and  the bag.  ? Leave the ice on for 30 minutes every two hours at first. Then apply the ice as needed.  Activity  · Rest as told by your health care provider.  ? Avoid sitting for a long time without moving. Get up to take short walks every 1-2 hours. This is important to improve blood flow and breathing. Ask for help if you feel weak or unsteady.  · Return to your normal activities as directed by your health care provider. Ask what activities are safe for you.  · Do exercises to improve motion and strength in your back (physical therapy), as recommended by your health care provider.  · Exercise regularly as directed by your health care provider.  General instructions    · Do not drink alcohol. Alcohol can interfere with your treatment.  · Do not use any products that contain nicotine or tobacco, such as cigarettes and e-cigarettes. These can delay bone healing. If you need help quitting, ask your health care provider.  · Keep all follow-up visits as told by your health care provider. This is important. It can help to prevent permanent injury, disability, and long-lasting (chronic) pain.  Contact a health care provider if:  · You have a fever.  · You develop a cough that makes your pain worse.  · Your pain medicine is not helping.  · Your pain does not get better over time.  · You cannot return to your normal activities as planned or expected.  Get help right away if:  · Your pain is very bad and it suddenly gets worse.  · You are unable to move any body part (paralysis) that is below the level of your injury.  · You have numbness, tingling, or weakness in any body part that is below the level of your injury.  · You cannot control your bladder or bowels.  Summary  · A spinal compression fracture is a collapse of the bones that form the spine (vertebrae).  · With this type of fracture, the vertebrae become pushed (compressed) into a wedge shape.  · Your symptoms and treatment will depend on the cause and severity of the  fracture and how quickly it develops.  · Some fractures may heal on their own with supportive care. Fractures that cause the back to become misshapen, cause nerve pain or weakness, or do not respond to other treatment may be treated with surgery.  This information is not intended to replace advice given to you by your health care provider. Make sure you discuss any questions you have with your health care provider.  Document Released: 12/18/2006 Document Revised: 02/13/2020 Document Reviewed: 01/29/2019  Elsevier Patient Education © 2020 [employer]    Spinal Stenosis    Spinal stenosis occurs when the open space (spinal canal) between the bones of your spine (vertebrae) narrows, putting pressure on the spinal cord or nerves.  What are the causes?  This condition is caused by areas of bone pushing into the central canals of your vertebrae. This condition may be present at birth (congenital), or it may be caused by:  · Arthritic deterioration of your vertebrae (spinal degeneration). This usually starts around age 50.  · Injury or trauma to the spine.  · Tumors in the spine.  · Calcium deposits in the spine.  What are the signs or symptoms?  Symptoms of this condition include:  · Pain in the neck or back that is generally worse with activities, particularly when standing and walking.  · Numbness, tingling, hot or cold sensations, weakness, or weariness in your legs.  · Pain going up and down the leg (sciatica).  · Frequent episodes of falling.  · A foot-slapping gait that leads to muscle weakness.  In more serious cases, you may develop:  · Problems passing stool or passing urine.  · Difficulty having sex.  · Loss of feeling in part or all of your leg.  Symptoms may come on slowly and get worse over time.  How is this diagnosed?  This condition is diagnosed based on your medical history and a physical exam. Tests will also be done, such as:  · MRI.  · CT scan.  · X-ray.  How is this treated?  Treatment for this  condition often focuses on managing your pain and any other symptoms. Treatment may include:  · Practicing good posture to lessen pressure on your nerves.  · Exercising to strengthen muscles, build endurance, improve balance, and maintain good joint movement (range of motion).  · Losing weight, if needed.  · Taking medicines to reduce swelling, inflammation, or pain.  · Assistive devices, such as a corset or brace.  In some cases, surgery may be needed. The most common procedure is decompression laminectomy. This is done to remove excess bone that puts pressure on your nerve roots.  Follow these instructions at home:  Managing pain, stiffness, and swelling  · Do all exercises and stretches as told by your health care provider.  · Practice good posture. If you were given a brace or a corset, wear it as told by your health care provider.  · Do not do any activities that cause pain. Ask your health care provider what activities are safe for you.  · Do not lift anything that is heavier than 10 lb (4.5 kg) or the limit that your health care provider tells you.  · Maintain a healthy weight. Talk with your health care provider if you need help losing weight.  · If directed, apply heat to the affected area as often as told by your health care provider. Use the heat source that your health care provider recommends, such as a moist heat pack or a heating pad.  ? Place a towel between your skin and the heat source.  ? Leave the heat on for 20-30 minutes.  ? Remove the heat if your skin turns bright red. This is especially important if you are not able to feel pain, heat, or cold. You may have a greater risk of getting burned.  General instructions  · Take over-the-counter and prescription medicines only as told by your health care provider.  · Do not use any products that contain nicotine or tobacco, such as cigarettes and e-cigarettes. If you need help quitting, ask your health care provider.  · Eat a healthy diet. This  includes plenty of fruits and vegetables, whole grains, and low-fat (lean) protein.  · Keep all follow-up visits as told by your health care provider. This is important.  Contact a health care provider if:  · Your symptoms do not get better or they get worse.  · You have a fever.  Get help right away if:  · You have new or worse pain in your neck or upper back.  · You have severe pain that cannot be controlled with medicines.  · You are dizzy.  · You have vision problems, blurred vision, or double vision.  · You have a severe headache that is worse when you stand.  · You have nausea or you vomit.  · You develop new or worse numbness or tingling in your back or legs.  · You have pain, redness, swelling, or warmth in your arm or leg.  Summary  · Spinal stenosis occurs when the open space (spinal canal) between the bones of your spine (vertebrae) narrows. This narrowing puts pressure on the spinal cord or nerves.  · Spinal stenosis can cause numbness, weakness, or pain in the neck, back, and legs.  · This condition may be caused by a birth defect, arthritic deterioration of your vertebrae, injury, tumors, or calcium deposits.  · This condition is usually diagnosed with MRIs, CT scans, and X-rays.  This information is not intended to replace advice given to you by your health care provider. Make sure you discuss any questions you have with your health care provider.  Document Released: 03/09/2005 Document Revised: 11/30/2018 Document Reviewed: 11/22/2017  [employer] Patient Education © 2020 [employer]

## 2020-09-18 ENCOUNTER — RESULTS ENCOUNTER (OUTPATIENT)
Dept: PAIN MEDICINE | Facility: CLINIC | Age: 85
End: 2020-09-18

## 2020-09-18 ENCOUNTER — TELEPHONE (OUTPATIENT)
Dept: NEUROSURGERY | Facility: CLINIC | Age: 85
End: 2020-09-18

## 2020-09-18 DIAGNOSIS — G89.29 CHRONIC BILATERAL LOW BACK PAIN WITHOUT SCIATICA: ICD-10-CM

## 2020-09-18 DIAGNOSIS — M81.0 OSTEOPOROSIS, UNSPECIFIED OSTEOPOROSIS TYPE, UNSPECIFIED PATHOLOGICAL FRACTURE PRESENCE: ICD-10-CM

## 2020-09-18 DIAGNOSIS — S32.030G CLOSED COMPRESSION FRACTURE OF L3 LUMBAR VERTEBRA WITH DELAYED HEALING, SUBSEQUENT ENCOUNTER: ICD-10-CM

## 2020-09-18 DIAGNOSIS — M54.50 CHRONIC BILATERAL LOW BACK PAIN WITHOUT SCIATICA: ICD-10-CM

## 2020-09-18 DIAGNOSIS — M48.061 SPINAL STENOSIS OF LUMBAR REGION WITHOUT NEUROGENIC CLAUDICATION: ICD-10-CM

## 2020-09-18 NOTE — TELEPHONE ENCOUNTER
Called and scheduled this patient an appointment to see Dr. Barriga next Wednesday. I spoke with her daughter and made the appointment.      Hany Barriga MD Mattingly, Kara, MA; Michelle Montgomery             See this patient next week    Previous Messages    ----- Message -----   From: Natalia Patel APRN   Sent: 9/17/2020  10:25 AM EDT   To: Hany Barriga MD, Jessica Joyce MA     Patient with L3 VCF that is new. Seeing PM next week. However, also has fairly significant and progressive spinal stenosis. Needs appt.     FYI- Dr. Nithya Lopez- please arrange appt soon. Thanks   ----- Message -----   From: Charo Spangler   Sent: 9/17/2020   9:12 AM EDT   To: EHSAN Quinones       ----- Message -----   From: Interface, Rad Results [place] In   Sent: 9/15/2020  11:13 AM EDT   To: EHSAN Bullock

## 2020-09-21 ENCOUNTER — APPOINTMENT (OUTPATIENT)
Dept: SPEECH THERAPY | Facility: HOSPITAL | Age: 85
End: 2020-09-21

## 2020-09-23 ENCOUNTER — OFFICE VISIT (OUTPATIENT)
Dept: NEUROSURGERY | Facility: CLINIC | Age: 85
End: 2020-09-23

## 2020-09-23 VITALS — DIASTOLIC BLOOD PRESSURE: 74 MMHG | TEMPERATURE: 97.3 F | HEART RATE: 71 BPM | SYSTOLIC BLOOD PRESSURE: 152 MMHG

## 2020-09-23 DIAGNOSIS — M48.061 SPINAL STENOSIS OF LUMBAR REGION WITHOUT NEUROGENIC CLAUDICATION: Primary | ICD-10-CM

## 2020-09-23 PROCEDURE — 99213 OFFICE O/P EST LOW 20 MIN: CPT | Performed by: NEUROLOGICAL SURGERY

## 2020-09-23 NOTE — PROGRESS NOTES
Subjective   History of Present Illness: Nyasia Dalal is a 87 y.o. female is here today for follow-up with a new Lumbar MRI and XR that was ordered at her last visit for back pain with weakness in her lower extremity's.    Today her symptoms are unchanged from her previous visit. She is seeing Dr. Dang with pain management. She is taking Meloxicam     Patient and her daughter both wearing a mask in our office today.    History of Present Illness    This patient returns today.  She is doing pretty well overall.  She says she has some pain in her back and into her leg in the mornings when she gets up but when she starts moving around she feels better.  She has no other new complaints.    The following portions of the patient's history were reviewed and updated as appropriate: allergies, current medications, past family history, past medical history, past social history, past surgical history and problem list.    Review of Systems   Respiratory: Negative for chest tightness and shortness of breath.    Cardiovascular: Negative for chest pain.   Musculoskeletal: Positive for back pain and myalgias.   Neurological: Positive for weakness.       I have reviewed the review of systems as documented by my MA.      Objective     Vitals:    09/23/20 1403   BP: 152/74   Pulse: 71   Temp: 97.3 °F (36.3 °C)     There is no height or weight on file to calculate BMI.      Physical Exam  Neurological:      Mental Status: She is alert and oriented to person, place, and time.       Neurologic Exam     Mental Status   Oriented to person, place, and time.           Assessment/Plan   Independent Review of Radiographic Studies:      I personally reviewed the images from the following studies.    I reviewed her MRI and plain films done on 14 September.  The plain films show no further compression of L2 or L3.  I also reviewed her MRI which shows severe stenosis at L2-3, L3-4 and L4-5.    Medical Decision Making:      I told the patient  and her daughter about the imaging.  I told him that from my point of view I think most of her pain is coming from the stenosis.  The fact that it gets better in the morning after she moves around is certainly a factor in that decision.  I told her that I really would not recommend doing any surgery on her as I think a 3 level decompression and fusion on someone her age would be a poor decision.  She did not have an injection done either and I think that is fine as well.  I told her to call me if she gets any worse and we can always reevaluate her.  They agree with that plan.    Nyasia was seen today for follow-up.    Diagnoses and all orders for this visit:    Spinal stenosis of lumbar region without neurogenic claudication      Return if symptoms worsen or fail to improve.

## 2020-09-28 ENCOUNTER — APPOINTMENT (OUTPATIENT)
Dept: SPEECH THERAPY | Facility: HOSPITAL | Age: 85
End: 2020-09-28

## 2020-09-30 DIAGNOSIS — M54.50 CHRONIC BILATERAL LOW BACK PAIN WITHOUT SCIATICA: ICD-10-CM

## 2020-09-30 DIAGNOSIS — G89.29 CHRONIC BILATERAL LOW BACK PAIN WITHOUT SCIATICA: ICD-10-CM

## 2020-09-30 DIAGNOSIS — M48.061 SPINAL STENOSIS OF LUMBAR REGION WITHOUT NEUROGENIC CLAUDICATION: Primary | ICD-10-CM

## 2020-09-30 NOTE — PROGRESS NOTES
Caudal Epidural steroid injection ordered.  Risks of Epidural steroid injection discussed at previous clinic visit.

## 2020-10-14 ENCOUNTER — LAB REQUISITION (OUTPATIENT)
Dept: LAB | Facility: HOSPITAL | Age: 85
End: 2020-10-14

## 2020-10-14 DIAGNOSIS — Z00.00 ENCOUNTER FOR GENERAL ADULT MEDICAL EXAMINATION WITHOUT ABNORMAL FINDINGS: ICD-10-CM

## 2020-10-16 ENCOUNTER — DOCUMENTATION (OUTPATIENT)
Dept: PAIN MEDICINE | Facility: CLINIC | Age: 85
End: 2020-10-16

## 2020-10-16 PROCEDURE — U0004 COV-19 TEST NON-CDC HGH THRU: HCPCS | Performed by: ANESTHESIOLOGY

## 2020-10-16 NOTE — PROGRESS NOTES
The patient has a pain history of the following:  Spinal stenosis  Low back pain  L3 vertebral compression fracture     Previous interventions that the patient has received include:   None     Pain medications include:  Aleve   Meloxicam     Previously: Tramadol (not on [facility]IGNA)     Other conservative modalities which the patient reports using include:  Physical Therapy: yes a few years ago   Ice pack - does not help      Past Significant Surgical History:  None    HPI:     CHIEF COMPLAINT Low back pain    Subjective   Nyasia Dalal is a 87 y.o. female  has a history of chronic low back pain with spinal stenosis.    Telephone visit today with her daughter who provided updated information on her mother's behalf.  Back pain and gait has worsened since her last visit, which is why she decided to schedule the injection.          REVIEW OF PERTINENT MEDICAL DATA  No new    The following portions of the patient's history were reviewed and updated as appropriate: allergies, current medications, past family history, past medical history, past social history, past surgical history and problem list.    Review of Systems   Musculoskeletal: Positive for back pain and gait problem.       There were no vitals filed for this visit.      Objective   Physical Exam        Assessment/Plan   Lumbar spinal stenosis  Chronic low back pain    --- Follow-up Caudal Epidural steroid injection then ~4 week follow-up.          Laure Dang MD  Pain Management

## 2020-10-17 LAB — SARS-COV-2 RNA RESP QL NAA+PROBE: NOT DETECTED

## 2020-10-19 ENCOUNTER — DOCUMENTATION (OUTPATIENT)
Dept: PAIN MEDICINE | Facility: CLINIC | Age: 85
End: 2020-10-19

## 2020-10-19 ENCOUNTER — OUTSIDE FACILITY SERVICE (OUTPATIENT)
Dept: PAIN MEDICINE | Facility: CLINIC | Age: 85
End: 2020-10-19

## 2020-10-19 PROCEDURE — 62323 NJX INTERLAMINAR LMBR/SAC: CPT | Performed by: ANESTHESIOLOGY

## 2020-10-19 NOTE — PROGRESS NOTES
Caudal Epidural:  [facility]    PREOPERATIVE DIAGNOSIS: Chronic low back pain, Lumbar Degenerative Disc Disease and Lumbar Spinal Stenosis without Neurogenic Claudication    POSTOPERATIVE DIAGNOSIS: Same as preop diagnosis    PROCEDURE:    • Caudal Epidural Steroid Injection, Therapeutic, with fluoroscopic guidance    PRE-PROCEDURE DISCUSSION WITH PATIENT:    Risks and complications were discussed with the patient prior to starting the procedure and informed consent was obtained.  We discussed various topics including but not limited to bleeding & infection & injury & paralysis & coma & death & worsening of clinical picture & postprocedural soreness & lack of pain relief.    SURGEON:  Laure Dang MD    REASON FOR PROCEDURE: Degenerative changes are noted in the area.    SEDATION:   Patient declined administration of moderate sedation    ANESTHETIC:  Marcaine 0.25%  STEROID:     15mg dexamethasone  TOTAL VOLUME OF INJECTATE: 6 mL    DESCRIPTON OF PROCEDURE:  After obtaining informed consent, the patient taken to the operating room and was placed in the prone position.  An IV  was not  started in the preoperative area.  All pressure points were well padded.  EKG, blood pressure, and pulse oximetry were monitored.  All sedation that was administered was given by the RN under my direct supervision and guidance.      The lumbosacral area was prepped with Chloraprep and draped in a sterile fashion with a sterile drape.  Lateral fluoroscopy was used to identify the sacral hiatus.  The skin and subcutaneous tissue overlying the area was anesthetized with 1% Lidocaine. A 22 gauge spinal needle was then advanced percutaneously through the anesthestized skin tract under fluoroscopic guidance into the caudal epidural space.  After negative aspiration for blood or CSF, a volume of 1mL of Omnipaque 180 was injected under live fluoroscopy. This revealed good epidural spread, with no evidence of loculation,  vascular run-off, or intrathecal spread.  Subsequently, a volume of 6mL consisting of 15mg of dexamethasone  mixed with 1mL of 0.25% bupivacaine and normal saline was injected without resistance.  The needle was removed intact.       ESTIMATED BLOOD LOSS:  minimal  SPECIMENS:  none    COMPLICATIONS:   No complications were noted., There was no indication of vascular uptake on live injection of contrast dye. and There was no indication of intrathecal uptake on live injection of contrast dye.    TOLERANCE & DISCHARGE CONDITION:    The patient tolerated the procedure well.  The patient was transported to the recovery area without difficulties.  The patient was discharged to home under the care of family in stable and satisfactory condition.    PLAN OF CARE:  1. The patient was given our standard instruction sheet.  2. The patient will Return to clinic 4 wks  3. The patient will resume all medications as per the medication reconciliation sheet.

## 2020-10-23 ENCOUNTER — TELEPHONE (OUTPATIENT)
Dept: PAIN MEDICINE | Facility: CLINIC | Age: 85
End: 2020-10-23

## 2020-11-02 ENCOUNTER — OFFICE VISIT (OUTPATIENT)
Dept: PAIN MEDICINE | Facility: CLINIC | Age: 85
End: 2020-11-02

## 2020-11-02 VITALS
RESPIRATION RATE: 18 BRPM | OXYGEN SATURATION: 98 % | HEART RATE: 78 BPM | DIASTOLIC BLOOD PRESSURE: 78 MMHG | HEIGHT: 62 IN | TEMPERATURE: 97.9 F | BODY MASS INDEX: 23.74 KG/M2 | SYSTOLIC BLOOD PRESSURE: 160 MMHG | WEIGHT: 129 LBS

## 2020-11-02 DIAGNOSIS — M48.061 SPINAL STENOSIS OF LUMBAR REGION WITHOUT NEUROGENIC CLAUDICATION: Primary | ICD-10-CM

## 2020-11-02 DIAGNOSIS — G89.29 OTHER CHRONIC PAIN: ICD-10-CM

## 2020-11-02 DIAGNOSIS — M47.816 LUMBAR FACET ARTHROPATHY: ICD-10-CM

## 2020-11-02 DIAGNOSIS — M53.3 SACROILIAC JOINT DYSFUNCTION OF RIGHT SIDE: ICD-10-CM

## 2020-11-02 PROCEDURE — 99214 OFFICE O/P EST MOD 30 MIN: CPT | Performed by: NURSE PRACTITIONER

## 2020-11-02 NOTE — PROGRESS NOTES
CHIEF COMPLAINT  Back pain is increasing - states the injection helped for about a day    Initial evaluation by EHSAN Beasley     Subjective   Nyasia Dalal is a 87 y.o. female  who presents to the office for follow-up of procedure. Office visit from 9/17/2020 with Dr. Trimble reviewed.  Patient was referred to our office by EHSAN Quesada for evaluation and treatment of back pain.  Her back pain started in June 2020 after lifting a cement block.  She described her pain as aching dull throbbing located in her low back with no radiation into the lower extremities.  She did report some radiation into the buttock at times.  On her lumbar MRI from 9/14/2020 she had an acute to subacute compression fracture at the L3 level, severe central canal stenosis secondary to disc bulging at L2-L3, and severe stable central canal stenosis at L3-L4 secondary to disc bulging and ligamentum flavum thickening.  Epidural steroid injection risks and benefits were discussed during this office visit, patient stated that she would like to wait and try conservative therapies first.  Back brace was prescribed to help with pain during strenuous activities, meloxicam 15 mg daily was also started.    Patient presents to this office visit with her daughter who is helpful in providing medical history during this visit.      She completed a Caudal Epidural   on  10/19/2020 performed by Dr. trimble for management of back pain. Patient reports 100% for 1 day relief from the procedure and then her pain returned to baseline. Discussed consideration of repeating this procedure as it can take multiple epidurals to achieve prolonged relief.  Patient states she does not want to repeat the epidural.      Today her pain 8/10VAS in severity.  Her pain is primarily over her right sacroiliac joint.  This pain is worsened when she goes from sitting to standing, or when getting out of bed.  The patient's daughter states that this pain seems to have  progressed. She does have some right lower extremity radicular pain in her anterior thigh as well as her lateral calf, patient states this is intermittent.      She was started on Meloxicam, this did not help as much as the aleve. Patient stopped taking the Meloxicam and has resumed aleve and tylenol. Patient advised to take the aleve with food to help protect her stomach.     Back Pain  This is a chronic (started in June 2020) problem. The current episode started more than 1 month ago. The problem occurs intermittently. The pain is present in the lumbar spine. The quality of the pain is described as stabbing and shooting (shocking). The pain radiates to the right thigh (right calf). The pain is at a severity of 8/10. The symptoms are aggravated by bending and standing (changing position). Associated symptoms include numbness and weakness. Pertinent negatives include no chest pain, dysuria, fever or headaches. Risk factors include history of osteoporosis and menopause. She has tried NSAIDs (aleve, caudal DENNIS) for the symptoms.      PEG Assessment   What number best describes your pain on average in the past week?7  What number best describes how, during the past week, pain has interfered with your enjoyment of life?8  What number best describes how, during the past week, pain has interfered with your general activity?  8    The following portions of the patient's history were reviewed and updated as appropriate: allergies, current medications, past family history, past medical history, past social history, past surgical history and problem list.    Review of Systems   Constitutional: Negative for chills and fever.   Respiratory: Negative for shortness of breath.    Cardiovascular: Negative for chest pain.   Gastrointestinal: Negative for constipation, diarrhea, nausea and vomiting.   Genitourinary: Negative for difficulty urinating, dyspareunia and dysuria.   Musculoskeletal: Positive for back pain.   Neurological:  "Positive for weakness and numbness. Negative for dizziness, light-headedness and headaches.   Psychiatric/Behavioral: Negative for confusion, hallucinations, self-injury, sleep disturbance and suicidal ideas. The patient is not nervous/anxious.      --  The aforementioned information the Chief Complaint section and above subjective data including any HPI data, and also the Review of Systems data, has been personally reviewed and affirmed.  --     Vitals:    11/02/20 0953   BP: 160/78   Pulse: 78   Resp: 18   Temp: 97.9 °F (36.6 °C)   SpO2: 98%   Weight: 58.5 kg (129 lb)   Height: 156.2 cm (61.5\")   PainSc:   8   PainLoc: Back     Objective   Physical Exam  Vitals signs and nursing note reviewed.   Constitutional:       Appearance: Normal appearance. She is well-developed.   HENT:      Head: Normocephalic and atraumatic.   Eyes:      General: Lids are normal.      Conjunctiva/sclera: Conjunctivae normal.   Neck:      Musculoskeletal: Normal range of motion.      Trachea: Trachea normal.   Cardiovascular:      Rate and Rhythm: Normal rate.   Pulmonary:      Effort: Pulmonary effort is normal.   Musculoskeletal:      Cervical back: She exhibits deformity (kyphotic curvature).      Lumbar back: She exhibits decreased range of motion and pain.      Comments: POS Right SLR  POS Right Ritchie  POS Right Thigh Thrust  POS Right SI Compression  NEG bony pain with palpation over lumbar spine   Skin:     General: Skin is warm and dry.   Neurological:      Mental Status: She is alert and oriented to person, place, and time.      Gait: Gait abnormal.   Psychiatric:         Speech: Speech normal.         Behavior: Behavior normal.         Judgment: Judgment normal.       Assessment/Plan   Diagnoses and all orders for this visit:    1. Spinal stenosis of lumbar region without neurogenic claudication (Primary)    2. Other chronic pain    3. Sacroiliac joint dysfunction of right side  -     Case Request    4. Lumbar facet " arthropathy      --- Right SI joint injection  Reviewed the procedure at length with the patient.  Included in the review was expectations, complications, risk and benefits.The procedure was described in detail and the risks, benefits and alternatives were discussed with the patient (including but not limited to: bleeding, infection, nerve damage, worsening of pain, no pain relief, inability to perform injection, seizures, and death) who agreed to proceed.  Discussed the potential for sedation if warranted/wanted.  The procedure will plan to be performed at [facility] with fluoroscopic guidance(unless ultrasound is indicated) and could potentially have steroids and contrast dye used. Questions were answered and in a way the patient could understand.  Patient verbalized understanding and wishes to proceed.  This intervention will be ordered.  Discussed with patient that all procedures are part of a multimodal plan of care and include either formal PT or a home exercise program.  Patient has no evidence of coagulopathy or current infection.  --- Discussed consideration of MBB and RFL for lumbar facet arthropathy, with minimal midline low back pain and no bony pain on palpation will hold off on this for now.   --- Follow-up after procedure     DIGNA REPORT  DIGNA report has been reviewed and scanned into the patient's chart.    As the clinician, I personally reviewed the DIGNA from 11/2/2020 while the patient was in the office today.    EMR Dragon/Transcription disclaimer:   Much of this encounter note is an electronic transcription/translation of spoken language to printed text. The electronic translation of spoken language may permit erroneous, or at times, nonsensical words or phrases to be inadvertently transcribed; Although I have reviewed the note for such errors, some may still exist.

## 2020-11-02 NOTE — PATIENT INSTRUCTIONS
Sacroiliac Joint Injection    A sacroiliac (SI) joint injection is a procedure to inject a numbing medicine (anesthetic block)--and sometimes a strong anti-inflammatory medicine (steroid)--into the SI joint. The SI joint is the joint between two bones of the pelvis called the sacrum and the ilium. The sacrum is the bone at the base of the spine. The ilium is the large bone that forms the hip.  You may need this procedure if you have pain because of an inflamed or diseased SI joint. Various conditions can cause pain in the SI joint, including rheumatoid arthritis, gout, psoriatic arthritis, infection, or injury. SI joint pain is a common cause of low back pain. It may also cause pain in your buttock or leg.  SI joint injection may be done to:  · Find out if an anesthetic block relieves pain. This can confirm that the SI joint is the cause of pain (diagnostic use).  · Treat a painful SI joint with steroids, anesthetic medicine, or both (therapeutic use).  Tell a health care provider about:  · Any allergies you have.  · All medicines you are taking, including vitamins, herbs, eye drops, creams, and over-the-counter medicines.  · Any problems you or family members have had with anesthetic medicines.  · Any blood disorders you have.  · Any surgeries you have had.  · Any medical conditions you have.  · Whether you are pregnant or may be pregnant.  What are the risks?  Generally, this is a safe procedure. However, problems may occur, including:  · Infection.  · Bleeding.  · Nerve injury.  · Temporary increase in pain.  · Headache.  · Failure of the procedure to relieve pain.  · Bruising or soreness at the joint, in deep tissues, or at the injection site.  · Allergic reactions to medicines or dyes.  · Side effects from the steroid medicine. These may include facial flushing, increased appetite, diarrhea, and increased blood sugar.  What happens before the procedure?  · You may have a physical exam.  · You may have imaging  tests, such as an X-ray, CT scan, or MRI.  · Ask your health care provider about:  ? Changing or stopping your regular medicines. This is especially important if you are taking diabetes medicines or blood thinners.  ? Taking medicines such as aspirin and ibuprofen. These medicines can thin your blood. Do not take these medicines unless your health care provider tells you to take them.  ? Taking over-the-counter medicines, vitamins, herbs, and supplements.  · Plan to have someone take you home from the hospital or clinic.  What happens during the procedure?  · To lower your risk of infection:  ? Your health care team will wash or sanitize their hands.  ? Your skin will be washed with a germ-killing (antiseptic) solution.  · You may be given one or more of the following:  ? A medicine to help you relax (sedative).  ? A medicine to numb the area (local anesthetic). Your health care provider will inject a local anesthetic into the skin above your SI joint.  · You will be placed in the proper position on a procedure table to give the health care team the best access to your SI joint.  · An X-ray machine that produces moving X-ray images (fluoroscopy) will be placed above the procedure table.  · A long, thin needle will be inserted through your skin and down to your SI joint.  · The position of the needle will be checked with fluoroscopy imaging.  · An X-ray dye (contrast media) will be injected to make sure the needle enters the joint space. You may be asked if you feel any pain.  · Long-acting anesthetic medicine will be injected. Long-acting steroid medicine may also be injected.  · The needle will be removed, and a bandage will be placed over the injection site.  The procedure may vary among health care providers and hospitals.  What happens after the procedure?  · Your blood pressure, heart rate, breathing rate, and blood oxygen level will be monitored until the medicines you were given have worn off.  · If dye was  used, you will be told to drink plenty of water to wash (flush) the dye out of your body.  · You may be asked if you have pain relief from the injection.  · You will likely be able to go home shortly after the procedure.  · Your health care provider will give you instructions for taking care of yourself after the procedure. These may include instructions for doing physical therapy exercises.  · Do not drive for 24 hours if you were given a sedative during the procedure.  Summary  · A sacroiliac (SI) joint injection is an injection of a numbing medicine (anesthetic block)--and sometimes a strong anti-inflammatory medicine (steroid)--into the SI joint.  · You will be awake during the procedure, but the injection area will be made numb.  · If you were given a medicine to help you relax (sedative during the procedure, do not drive for at least 24 hours.  This information is not intended to replace advice given to you by your health care provider. Make sure you discuss any questions you have with your health care provider.  Document Released: 09/24/2018 Document Revised: 11/30/2018 Document Reviewed: 09/24/2018  Elsevier Patient Education © 2020 [employer]

## 2020-11-05 ENCOUNTER — LAB REQUISITION (OUTPATIENT)
Dept: LAB | Facility: HOSPITAL | Age: 85
End: 2020-11-05

## 2020-11-05 DIAGNOSIS — Z00.00 ENCOUNTER FOR GENERAL ADULT MEDICAL EXAMINATION WITHOUT ABNORMAL FINDINGS: ICD-10-CM

## 2020-11-06 PROCEDURE — U0004 COV-19 TEST NON-CDC HGH THRU: HCPCS | Performed by: ANESTHESIOLOGY

## 2020-11-07 LAB — SARS-COV-2 RNA RESP QL NAA+PROBE: NOT DETECTED

## 2020-11-09 ENCOUNTER — DOCUMENTATION (OUTPATIENT)
Dept: PAIN MEDICINE | Facility: CLINIC | Age: 85
End: 2020-11-09

## 2020-11-09 ENCOUNTER — OUTSIDE FACILITY SERVICE (OUTPATIENT)
Dept: PAIN MEDICINE | Facility: CLINIC | Age: 85
End: 2020-11-09

## 2020-11-09 PROCEDURE — 27096 INJECT SACROILIAC JOINT: CPT | Performed by: ANESTHESIOLOGY

## 2020-11-09 NOTE — PROGRESS NOTES
Right Sacroiliac Joint Injection  [facility]      PREOPERATIVE DIAGNOSIS:   Sacroiliac joint dysfunction    POSTOPERATIVE DIAGNOSIS:  Sacroiliac joint dysfunction    PROCEDURE:  Sacroiliac Joint Injection, with fluoroscopic guidance CTP 20503     PRE-PROCEDURE DISCUSSION WITH PATIENT:    Risks and complications were discussed with the patient prior to starting the procedure and informed consent was obtained.  We discussed various topics including but not limited to bleeding, infection, injury, postprocedural site soreness, painful flareup, worsening of clinical picture, paralysis, coma, and death.     SURGEON:  Laure Dang MD    REASON FOR PROCEDURE:    Patient has pain consistent with SI pathology on history and physical exam. Tender to palpation over the affected SI joint.    SEDATION:  Patient declined administration of moderate sedation    ANESTHETIC AGENT:  Lidocaine 1%  STEROID AGENT:  15mg Dexamethasone    DESCRIPTON OF PROCEDURE:  After obtaining informed consent, IV access was not obtained in the preoperative area.  The patient was transported to the operative suite and placed in the prone position with a pillow under the pelvic area. EKG, blood pressure, and pulse oximeter were monitored. The lumbosacral area was prepped with Chloraprep and draped in a sterile fashion. Under fluoroscopic guidance the inferior most portion of the right SI joint was identified. The overlying skin and subcutaneous tissue was anesthetized with 1% lidocaine. A 22-gauge spinal needle was then advanced under fluoroscopic guidance in a coaxial view into the joint space.  After negative aspiration, 1 mL of Omnipaque was injected, and after seeing appropriate spread into the joint a volume of 3ml of the above medication was injected.    Needle was removed intact.      Vital signs remained stable.  The onset of analgesia was noted.      ESTIMATED BLOOD LOSS:  minimal  SPECIMENS:  None  COMPLICATIONS:  No  complications were noted. and There was no indication of vascular uptake on live injection of contrast dye.    TOLERANCE & DISCHARGE CONDITION:    The patient tolerated the procedure well.  The patient was transported to the recovery area without difficulties.  The patient was discharged to home under the care of family in stable and satisfactory condition.    PLAN OF CARE:  1. The patient was given our standard instruction sheet and will resume all medications as per the medication reconciliation sheet.  2. The patient will Return to clinic 4-6 wks.  3. The patient is instructed to keep an account of pain relief after the procedure.

## 2020-12-07 ENCOUNTER — OFFICE VISIT (OUTPATIENT)
Dept: PAIN MEDICINE | Facility: CLINIC | Age: 85
End: 2020-12-07

## 2020-12-07 VITALS
HEIGHT: 62 IN | HEART RATE: 86 BPM | OXYGEN SATURATION: 96 % | TEMPERATURE: 97.3 F | RESPIRATION RATE: 20 BRPM | DIASTOLIC BLOOD PRESSURE: 75 MMHG | SYSTOLIC BLOOD PRESSURE: 148 MMHG | WEIGHT: 132.6 LBS | BODY MASS INDEX: 24.4 KG/M2

## 2020-12-07 DIAGNOSIS — M53.3 SACROILIAC JOINT DYSFUNCTION OF RIGHT SIDE: Primary | ICD-10-CM

## 2020-12-07 DIAGNOSIS — M47.816 LUMBAR FACET ARTHROPATHY: ICD-10-CM

## 2020-12-07 DIAGNOSIS — M48.061 SPINAL STENOSIS OF LUMBAR REGION WITHOUT NEUROGENIC CLAUDICATION: ICD-10-CM

## 2020-12-07 DIAGNOSIS — G89.29 OTHER CHRONIC PAIN: ICD-10-CM

## 2020-12-07 PROCEDURE — 99213 OFFICE O/P EST LOW 20 MIN: CPT | Performed by: NURSE PRACTITIONER

## 2020-12-07 NOTE — PROGRESS NOTES
"CHIEF COMPLAINT  F/u back pain. Pt had Right Sacroiliac Joint Injection and sts receiving 80% relief x 1 day then pain returned to baseline.     Subjective   Nyasia Dalal is a 87 y.o. female  who presents for follow-up.  She has a history of back pain.  She completed a Right sacroiliac joint injection on 11/9/2020 performed by Dr. Dang.  She reports 80% relief x 1 day. Patient reports that her pain is better, but not \"all the way gone\".  Patient has no pain over the right sacroiliac joint at this time, during her previous assessment on 11/2/2020 she has exquisite pain with palpation over that area. Patient is a poor historian and presents with her daughter today.      Today her pain is 0/10VAS in severity.  She continues with OTC aleve 2 tablets BID.  Although patient's pain is 0 she states that her pain is not controlled.  Discussed with patient and daughter that I do not recommend any of the medications that we typically prescribed for pain as many of these medications can cause confusion, respiratory depression, and somnolence.  Daughter states that she does not want to go down that path for medication for her mother at this time either.  Patient states that she is having to use the walls to help her walk, she denies any dizziness.  Patient is agreeable to physical therapy today.  Discussed with patient that this is good as physical therapy can help increase her strength and balance.    Procedure list:  11/9/2020-right sacroiliac joint injection-80% improvement x1 day  10/19/2020-caudal epidural-100% improvement x1 day    Patient remained masked during entire encounter. No cough present. I donned a mask and eye protection throughout entire visit. Prior to donning mask and eye protection, hand hygiene was performed, as well as when it was doffed.  I was closer than 6 feet, but not for an extended period of time. No obvious exposure to any bodily fluids.    Back Pain  This is a chronic (started in June 2020) " problem. The current episode started more than 1 month ago. The problem occurs intermittently. The pain is present in the lumbar spine. The quality of the pain is described as stabbing and shooting (shocking). The pain radiates to the right thigh (right calf). The pain is at a severity of 0/10. The symptoms are aggravated by bending and standing (changing position). Pertinent negatives include no chest pain, dysuria, fever, headaches, numbness or weakness. Risk factors include history of osteoporosis and menopause. She has tried NSAIDs (aleve, caudal DENNIS) for the symptoms.      PEG Assessment   What number best describes your pain on average in the past week?0  What number best describes how, during the past week, pain has interfered with your enjoyment of life?0  What number best describes how, during the past week, pain has interfered with your general activity?  0    The following portions of the patient's history were reviewed and updated as appropriate: allergies, current medications, past family history, past medical history, past social history, past surgical history and problem list.    Review of Systems   Constitutional: Negative for activity change, fatigue and fever.   HENT: Negative for congestion.    Eyes: Negative for visual disturbance.   Respiratory: Negative for apnea, cough and shortness of breath.    Cardiovascular: Negative for chest pain.   Gastrointestinal: Negative for constipation and diarrhea.   Genitourinary: Negative for difficulty urinating and dysuria.   Musculoskeletal: Positive for back pain.   Allergic/Immunologic: Negative for immunocompromised state.   Neurological: Negative for dizziness, weakness, light-headedness, numbness and headaches.   Psychiatric/Behavioral: Negative for agitation, sleep disturbance and suicidal ideas. The patient is not nervous/anxious.      --  The aforementioned information the Chief Complaint section and above subjective data including any HPI data, and  "also the Review of Systems data, has been personally reviewed and affirmed.  --    Vitals:    12/07/20 1457   BP: 148/75   Pulse: 86   Resp: 20   Temp: 97.3 °F (36.3 °C)   SpO2: 96%   Weight: 60.1 kg (132 lb 9.6 oz)   Height: 156.2 cm (61.5\")   PainSc: 0-No pain   PainLoc: Back     Objective   Physical Exam  Vitals signs and nursing note reviewed.   Constitutional:       Appearance: Normal appearance. She is well-developed.   HENT:      Head: Normocephalic and atraumatic.   Eyes:      General: Lids are normal.      Conjunctiva/sclera: Conjunctivae normal.   Neck:      Musculoskeletal: Normal range of motion.      Trachea: Trachea normal.   Cardiovascular:      Rate and Rhythm: Normal rate.   Pulmonary:      Effort: Pulmonary effort is normal.   Musculoskeletal:      Lumbar back: She exhibits decreased range of motion and tenderness.      Comments: Tenderness over sacrum, no SI joint pain  NEG Bilateral Ritchie  NEG Bilateral Thigh Thrust  NEG Bilateral SLR   Skin:     General: Skin is warm and dry.   Neurological:      Mental Status: She is alert and oriented to person, place, and time.   Psychiatric:         Attention and Perception: Attention normal.         Mood and Affect: Affect is flat.         Speech: Speech is delayed.         Behavior: Behavior is slowed.         Judgment: Judgment normal.       MRI LUMBAR SPINE WITHOUT CONTRAST     CLINICAL HISTORY: Vertebral compression fracture.     MRI of the lumbar spine is obtained with sagittal T1, proton-density,  and T2-weighted images. Additionally, there are axial T1 and T2-weighted  images through the lumbar spine.     Comparison is made to previous MRI of the lumbar spine dated 01/08/2019.     FINDINGS:     There are findings consistent with an acute to subacute compression  fracture at the L3 level with approximately 30% to 40% loss of vertebral  body height within the maximally compressed portion of the vertebra.  Marrow edema is noted throughout the upper " aspect of the L3 vertebral  body compatible with this acute to subacute nature. This vertebral body  height loss is new when compared to the prior study of 01/08/2019.      The conus medullaris terminates at the level of the mid body of L1 and  has normal signal intensity. The visualized distal thoracic spinal cord  is unremarkable.     At T12-L1, there is a disc bulge which mildly indents the ventral  subarachnoid space.     At L1-2, again a disc bulge is identified which mildly indents the  ventral subarachnoid space. There is ligamentum flavum thickening and  facet hypertrophic change in addition to the disc bulge which results in  a mild degree of canal stenosis. A similar degree of canal stenosis was  identified on the prior exam.     At the L2-3 level, there is severe central canal stenosis secondary to  disc bulging, ligament flavum thickening, and facet arthropathy. The  canal stenosis at the L2-3 level has progressed from the prior  examination where there was a moderate degree of canal stenosis. This  interval worsening is due to a more prominent disc bulge at this level.  There is a new annular fissure at the L2-3 level. There is a  mild-to-moderate degree of left and a moderate degree of right foraminal  narrowing is identified which has also progressed since the prior exam.  On the previous study, there is only a relatively mild degree of  foraminal stenosis.     At the L3-4 level, severe canal stenosis is identified secondary to disc  bulging, ligamentum thickening, and facet hypertrophic change. A similar  degree of canal stenosis was noted on the prior exam. There is mild left  and mild-to-moderate right foraminal narrowing secondary to disc bulging  and facet hypertrophic change. When compared to the prior study, a very  similar degree of canal or foraminal stenosis was seen.     At L4-5, there is anterior spondylolisthesis of L4 on L5 by  approximately 9 mm. There is moderate to severe bilateral  foraminal  stenosis secondary to unroofed bulging disc material and facet  hypertrophic change. Severe central canal stenosis is identified  secondary to disc bulging, ligamentum flavum thickening, and severe  facet hypertrophic change. Overall, the pars interarticularis at the L4  level are not well seen and integrity cannot be definitively assessed  although I do suspect that the pars interarticularis are intact  bilaterally at the L4 level. Overall, no significant interval change is  seen when compared to the prior examination of 01/08/2019.     At L5-S1, there is anterior spondylolisthesis of L5 on S1 by  approximately 3 mm. There is bulging disc material and facet  hypertrophic change that results in a mild degree of bilateral foraminal  narrowing. Again, very similar findings are noted on the prior study.     There is a deformity within the anterior cortex of the S3 segment of the  sacrum. These findings were also noted on the prior examination and the  findings are felt to most likely represent a chronic fracture site.     IMPRESSION:     There are findings compatible with an acute to subacute compression  fracture at the L3 level with approximately 30% to 40% loss of vertebral  body height within the maximally compressed portion of the vertebra.  This loss of vertebral body height is new when compared to the prior  exam 01/08/2019.     At the L2-3 level, there is severe central canal stenosis secondary to  disc bulging, ligamentum flavum thickening, and facet arthropathy. This  canal stenosis has significantly progressed since the prior exam due to  a greater degree of bulging disc material. A new posterior annular  fissure is identified at the L2-3 level. There is a mild-to-moderate  degree of left and a moderate degree of right L2-3 foraminal stenosis  which is also progressed in the interval.     At the L3-4 level, stable severe central canal stenosis is identified  secondary to disc bulging, ligamentum  flavum thickening, and facet  hypertrophic change. At the L4-5 level, there is also stable severe  central canal stenosis secondary to disc bulging, ligamentum flavum  thickening, and facet hypertrophic change. There is anterior  spondylolisthesis of L4 and L5 by approximately 9 mm which is also  stable. Overall, the pars interarticularis at the L4 level are not well  seen and their integrity is not definitively assessed although I do  suspect that they are intact.     The remaining multilevel degenerative phenomena within the lumbar spine  are also stable and discussed in detail above.     Incidental note is made of a deformity within the anterior cortex of S3,  which I suspect is representative of a chronic fracture site. Similar  findings were noted on the prior examination.     This report was finalized on 9/15/2020 11:10 AM by Dr. Compa Espinosa M.D.    Assessment/Plan   Diagnoses and all orders for this visit:    1. Sacroiliac joint dysfunction of right side (Primary)    2. Lumbar facet arthropathy  -     Ambulatory Referral to Physical Therapy Evaluate and treat    3. Spinal stenosis of lumbar region without neurogenic claudication  -     Ambulatory Referral to Physical Therapy Evaluate and treat    4. Other chronic pain      --- This was an extended office visit. Over 15 minutes was spent in face to face contact with the patient. Over half of the time was spent in education and counseling.   --- Reviewed MRI of lumbar spine results with daughter and patient.  He using a model showed patient and daughter areas of severe spinal stenosis, facet arthropathy, and foraminal narrowing.  Discussed that her pain is likely coming from multiple areas, and while she may have gotten relief from the sacroiliac joint injection she is still having pain because of the level degeneration in her back.  Discussed that we could consider lumbar medial branch blocks with radiofrequency lesioning, but I am concerned about the  patient's ability to provide accurate pain scores before and after the procedure.  This would make diagnostic medial branch blocks and subsequently performing RFL difficult.  Discussed that I will have her follow-up with Dr. Dang following a round of physical therapy.  --- Follow-up with Dr. Dang in 6 weeks      DIGNA REPORT    DIGNA report has been reviewed and scanned into the patient's chart.    As the clinician, I personally reviewed the DIGNA from 12/7/2020 while the patient was in the office today.    EMR Dragon/Transcription disclaimer:   Much of this encounter note is an electronic transcription/translation of spoken language to printed text. The electronic translation of spoken language may permit erroneous, or at times, nonsensical words or phrases to be inadvertently transcribed; Although I have reviewed the note for such errors, some may still exist.

## 2021-01-04 ENCOUNTER — TREATMENT (OUTPATIENT)
Dept: PHYSICAL THERAPY | Facility: CLINIC | Age: 86
End: 2021-01-04

## 2021-01-04 DIAGNOSIS — M47.816 LUMBAR FACET ARTHROPATHY: ICD-10-CM

## 2021-01-04 DIAGNOSIS — M48.061 SPINAL STENOSIS OF LUMBAR REGION WITHOUT NEUROGENIC CLAUDICATION: ICD-10-CM

## 2021-01-04 DIAGNOSIS — M53.3 SI (SACROILIAC) JOINT DYSFUNCTION: Primary | ICD-10-CM

## 2021-01-04 PROCEDURE — 97140 MANUAL THERAPY 1/> REGIONS: CPT | Performed by: PHYSICAL THERAPIST

## 2021-01-04 PROCEDURE — 97161 PT EVAL LOW COMPLEX 20 MIN: CPT | Performed by: PHYSICAL THERAPIST

## 2021-01-04 PROCEDURE — 97110 THERAPEUTIC EXERCISES: CPT | Performed by: PHYSICAL THERAPIST

## 2021-01-04 NOTE — PATIENT INSTRUCTIONS
Access Code: [email or web address]   URL: [email or web address]   Date: 01/04/2021   Prepared by: Tammy Corrales     Exercises  Supine Gluteal Sets - 10 reps - 2 sets - 5 hold - 1x daily - 7x weekly  Hooklying Clamshell with Resistance - 10 reps - 1 sets - 5 hold - 1x daily - 7x weekly  Supine Hip Adduction Isometric with Ball - 10 reps - 1 sets - 5 hold - 1x daily - 7x weekly  Sit to Stand with Counter Support - 10 reps - 1 sets - 5 hold - 1x daily - 7x weekly

## 2021-01-04 NOTE — PROGRESS NOTES
Physical Therapy Initial Evaluation and Plan of Care      Patient: Nyasia Dalal   : 1933  Diagnosis/ICD-10 Code:  SI (sacroiliac) joint dysfunction [M53.3]  Referring practitioner: EHSAN Kuo  Date of Initial Visit: 2021  Today's Date: 2021  Patient seen for 1 sessions           Subjective Questionnaire: Oswestry:       Subjective Evaluation    History of Present Illness  Date of onset: 2020  Mechanism of injury: Pt tried to lift a heavy concrete block back in  and has pain in the L buttcoks since then.  She has no trouble sleeping and sitting but the pain is worst when standing or lifting things.  She has a lidocaine patch and takes Ibuprofen and that helps. She has had tingling in the front o f the thigh since  as well.     MRI results  There are findings compatible with an acute to subacute compression  fracture at the L3 level with approximately 30% to 40% loss of vertebral  body height within the maximally compressed portion of the vertebra.  This loss of vertebral body height is new when compared to the prior  exam 2019.     At the L2-3 level, there is severe central canal stenosis secondary to  disc bulging, ligamentum flavum thickening, and facet arthropathy. This  canal stenosis has significantly progressed since the prior exam due to  a greater degree of bulging disc material. A new posterior annular  fissure is identified at the L2-3 level. There is a mild-to-moderate  degree of left and a moderate degree of right L2-3 foraminal stenosis  which is also progressed in the interval.     At the L3-4 level, stable severe central canal stenosis is identified  secondary to disc bulging, ligamentum flavum thickening, and facet  hypertrophic change. At the L4-5 level, there is also stable severe  central canal stenosis secondary to disc bulging, ligamentum flavum  thickening, and facet hypertrophic change. There is anterior  spondylolisthesis of L4 and L5 by  approximately 9 mm which is also  stable. Overall, the pars interarticularis at the L4 level are not well  seen and their integrity is not definitively assessed although I do  suspect that they are intact.     The remaining multilevel degenerative phenomena within the lumbar spine  are also stable and discussed in detail above.       Subjective comment: L buttocks pain  Patient Occupation: retired Quality of life: good    Pain  Current pain ratin  At best pain ratin  At worst pain rating: 10  Location: L hip   Quality: dull ache, throbbing, pressure, sharp and tight  Aggravating factors: ambulation, squatting, stairs, lifting and repetitive movement    Social Support  Lives in: one-story house    Diagnostic Tests  X-ray: abnormal  MRI studies: abnormal    Treatments  Previous treatment: medication  Current treatment: physical therapy  Patient Goals  Patient goals for therapy: increased strength, decreased pain, increased motion and independence with ADLs/IADLs             Objective          Static Posture     Shoulders  Depressed and rounded.    Thoracic Spine  Hyperkyphosis.    Lumbar Spine   Lumbar spine (Left): Rotated.     Pelvis   Posterior pelvic tilt  Pelvis (Left): Obliquity.     Comments  L ASIS high, PSIS low, med malloelus high  Hips flexed in approx 15 degrees    Postural Observations  Seated posture: poor  Standing posture: poor  Correction of posture: makes symptoms worse    Additional Postural Observation Details  Standing erect increases back and hip pain    Palpation   Left   No palpable tenderness to the erector spinae and iliopsoas.   Tenderness of the gluteus medius, piriformis and TFL.     Neurological Testing     Sensation     Lumbar   Left   Intact: light touch    Right   Intact: light touch    Active Range of Motion     Lumbar   Extension: with pain    Additional Active Range of Motion Details  Trace lumbar ext and pt cannot attain neutral spine    Strength/Myotome Testing     Left  Hip   Planes of Motion   Flexion: 4-  Abduction: 3-  External rotation: 3+    Right Hip   Planes of Motion   Abduction: 4  External rotation: 4    Left Knee   Flexion: 4-  Extension: 4    Right Knee   Flexion: 4  Extension: 4    Left Ankle/Foot   Dorsiflexion: 4-  Plantar flexion: 4-    Right Ankle/Foot   Dorsiflexion: 4  Plantar flexion: 4    Muscle Activation   Patient unable to activate left transverse abdominals, left external obliques, left multifidus, right transverse abdominals, right external obliques and right multifidus.     Tests     Lumbar     Left   Negative passive SLR.     Left Hip   Positive Gillet's.   Negative Gaenslen's.     Ambulation     Observational Gait     Additional Observational Gait Details  PPT with no pelvic rotation, shoulders posterior to hips, short step length    Functional Assessment   Squat   Left tibial anterior translation beyond toes and right tibial anterior translation beyond toes.     Single Leg Stance   Left: 0 seconds  Right: 0 seconds     General Comments     Lumbar Comments  L HS 18 degrees in supine 90/90   L piriformis 33 degrees in supine 90/90  R HS 12 degrees in supine 90/90  R piriformis 55 degrees in supine 90/90         Assessment & Plan     Assessment  Impairments: abnormal gait, abnormal or restricted ROM, activity intolerance, impaired physical strength, lacks appropriate home exercise program, pain with function and weight-bearing intolerance  Assessment details: Pt presents with L hip pain in the buttocks area after lifting a concrete block in June and the pain has persisted in that area and she has tingling in the anterior thigh since the injury as well.  Her pain is at the lateral and posterior hip and she is TTP at the TFL, IT band, glute med and piriformis.  She has uneven pelvic landmarks indicating pelvic innominate rotation posteriorly on the L which was corrected well with MET.  She has weakness in the L hip and some tenderness in the low back  indicating she may have impingement on the nerve root causing pain and weakness in this area.  She has dx of bulging disc and retrolisthesis in the spine so this could explain a cause of her pain as she cannot connect the pain with any activity as she is sedentary.  Pt would benefit from skilled PT to address her deficits and reduce her pain.     Prognosis: good  Functional Limitations: carrying objects, lifting, walking, uncomfortable because of pain, moving in bed, stooping and unable to perform repetitive tasks  Goals  Plan Goals: SHORT TERM GOALS:  3 weeks       1. Pt independent with HEP with minimal increase in pain  2. Pt to demonstrate L hip strength by 1/2 grade to improve stability with ambulation and indicate improved ms recruitment to help stabilize the pelvis during mobility  3. Pt to report being able stand for greater than 5 min with pain at a level of 5/10 or less    LONG TERM GOALS:   6 weeks  1. Pt to demonstrate ability to perform full functional squat with good form and control of the hips and without increasing pain  2. Pt to demonstrate L hip strength to 4/5 or greater to improve safety with ambulation on uneven surfaces  3. Pt to report min-no TTP at L hip muscles laterally and posteriorly  4. Pt to demonstrate ability to navigate stairs reciprocally with pain 2/10 or less safely with one HR   5.  Pt to score 10% higher on LEFS indicating improved perceived functional ability          Plan  Therapy options: will be seen for skilled physical therapy services  Planned modality interventions: microcurrent electrical stimulation, thermotherapy (hydrocollator packs), ultrasound, high voltage pulsed current (spasm management), high voltage pulsed current (pain management), electrical stimulation/Russian stimulation, cryotherapy and dry needling  Planned therapy interventions: manual therapy, neuromuscular re-education, soft tissue mobilization, strengthening, stretching, joint mobilization, home  exercise program, gait training, flexibility, abdominal trunk stabilization, balance/weight-bearing training, body mechanics training, therapeutic activities and transfer training  Frequency: 2x week  Duration in weeks: 6  Treatment plan discussed with: patient        Manual Therapy:    17     mins  17104;  Therapeutic Exercise:    14     mins  07186;     Neuromuscular Ethan:    0    mins  97944;    Therapeutic Activity:     0     mins  97468;     Gait Trainin     mins  58504;     Ultrasound:     0     mins  75877;    Electrical Stimulation:    0     mins  44419 ( );  Dry Needling     0     mins self-pay    Timed Treatment:   31   mins   Total Treatment:     55   mins    PT SIGNATURE: Tammy Corrales, PT   DATE TREATMENT INITIATED: 2021    Initial Certification  Certification Period: 2021  I certify that the therapy services are furnished while this patient is under my care.  The services outlined above are required by this patient, and will be reviewed every 90 days.     PHYSICIAN: Sofia Casiano APRN      DATE:     Please sign and return via fax to [phone].. Thank you, [facility].

## 2021-01-14 ENCOUNTER — TREATMENT (OUTPATIENT)
Dept: PHYSICAL THERAPY | Facility: CLINIC | Age: 86
End: 2021-01-14

## 2021-01-14 DIAGNOSIS — M53.3 SI (SACROILIAC) JOINT DYSFUNCTION: Primary | ICD-10-CM

## 2021-01-14 DIAGNOSIS — M47.816 LUMBAR FACET ARTHROPATHY: ICD-10-CM

## 2021-01-14 DIAGNOSIS — M48.061 SPINAL STENOSIS OF LUMBAR REGION WITHOUT NEUROGENIC CLAUDICATION: ICD-10-CM

## 2021-01-14 PROCEDURE — 97140 MANUAL THERAPY 1/> REGIONS: CPT | Performed by: PHYSICAL THERAPIST

## 2021-01-14 PROCEDURE — 97530 THERAPEUTIC ACTIVITIES: CPT | Performed by: PHYSICAL THERAPIST

## 2021-01-14 PROCEDURE — 97110 THERAPEUTIC EXERCISES: CPT | Performed by: PHYSICAL THERAPIST

## 2021-01-14 NOTE — PROGRESS NOTES
Physical Therapy Daily Progress Note    Patient: Nyasia Dalal   : 1933  Diagnosis/ICD-10 Code:  SI (sacroiliac) joint dysfunction [M53.3]  Referring practitioner: EHSAN Kuo  Date of Initial Visit: Type: THERAPY  Noted: 2021  Today's Date: 2021  Patient seen for 2 sessions         Nyasia Dalal reports: exercises are okay. Still having trouble getting into and out of bed.    Subjective     Objective   -Pt is soft spoken and does well with short instuctions instead of multiple step instructions.  See Exercise, Manual, and Modality Logs for complete treatment.       Assessment/Plan  Compliant and cooperative with rehab efforts. Subjectively, pt reports no increase of pain or discomfort with interventions performed today. Performed well with all interventions with short verbal and tactile cues for every activity. Continues to demonstrate tingling in L leg upon standing for any length of time.      Progress per Plan of Care           Manual Therapy:    8     mins  47552;  Therapeutic Exercise:    12     mins  25436;     Neuromuscular Ethan:        mins  33665;    Therapeutic Activity:     20     mins  91696;     Gait Training:           mins  98578;     Ultrasound:          mins  88667;    Electrical Stimulation:         mins  48332 ( );  Dry Needling          mins self-pay    Timed Treatment:   40   mins   Total Treatment:     40   mins    lEla Veliz PTA  Physical Therapist Assistant [phone]

## 2021-01-19 ENCOUNTER — OFFICE VISIT (OUTPATIENT)
Dept: PAIN MEDICINE | Facility: CLINIC | Age: 86
End: 2021-01-19

## 2021-01-19 ENCOUNTER — TREATMENT (OUTPATIENT)
Dept: PHYSICAL THERAPY | Facility: CLINIC | Age: 86
End: 2021-01-19

## 2021-01-19 VITALS
WEIGHT: 131.4 LBS | HEART RATE: 89 BPM | OXYGEN SATURATION: 95 % | BODY MASS INDEX: 24.18 KG/M2 | DIASTOLIC BLOOD PRESSURE: 83 MMHG | TEMPERATURE: 97.1 F | SYSTOLIC BLOOD PRESSURE: 158 MMHG | HEIGHT: 62 IN | RESPIRATION RATE: 18 BRPM

## 2021-01-19 DIAGNOSIS — M81.0 OSTEOPOROSIS, UNSPECIFIED OSTEOPOROSIS TYPE, UNSPECIFIED PATHOLOGICAL FRACTURE PRESENCE: ICD-10-CM

## 2021-01-19 DIAGNOSIS — M47.816 LUMBAR FACET ARTHROPATHY: ICD-10-CM

## 2021-01-19 DIAGNOSIS — M48.061 SPINAL STENOSIS OF LUMBAR REGION WITHOUT NEUROGENIC CLAUDICATION: ICD-10-CM

## 2021-01-19 DIAGNOSIS — M53.3 SI (SACROILIAC) JOINT DYSFUNCTION: Primary | ICD-10-CM

## 2021-01-19 DIAGNOSIS — M46.1 SACROILIITIS (HCC): Primary | ICD-10-CM

## 2021-01-19 DIAGNOSIS — M54.16 LUMBAR RADICULOPATHY: ICD-10-CM

## 2021-01-19 PROCEDURE — 97140 MANUAL THERAPY 1/> REGIONS: CPT | Performed by: PHYSICAL THERAPIST

## 2021-01-19 PROCEDURE — 97110 THERAPEUTIC EXERCISES: CPT | Performed by: PHYSICAL THERAPIST

## 2021-01-19 PROCEDURE — 99213 OFFICE O/P EST LOW 20 MIN: CPT | Performed by: ANESTHESIOLOGY

## 2021-01-19 NOTE — PROGRESS NOTES
The patient has a pain history of the following:  Spinal stenosis  Low back pain  L3 vertebral compression fracture     Previous interventions that the patient has received include:   Right sacroiliac joint injection 11/9/2020 80% relief 1 day  Caudal Epidural steroid injection 10/19/2020 100% relief 1 day     Pain medications include:  Aleve      Previously: Tramadol (not on DIGNA), Meloxicam     Other conservative modalities which the patient reports using include:  Physical Therapy: yes a few years ago   Ice pack - does not help      Past Significant Surgical History:  None    HPI:     CHIEF COMPLAINT Back Pain      Subjective   Nyasia Dalal is a 87 y.o. female  who presents for follow-up.  She has a history of back pain.  She is a poor historian and presents with her daughter who helped provide her history today.  Since her last appointment she's completed a few visits of physical therapy.      Ms. Dalal states that she's currently going to physical therapy.  She says that her back is not causing her any pain.  Recently, for the last ~2 weeks, she's been experiencing numbness/tingling into her left leg that travels around the whole leg and stops at the ankle.         PEG Assessment   What number best describes your pain on average in the past week?5  What number best describes how, during the past week, pain has interfered with your enjoyment of life?9  What number best describes how, during the past week, pain has interfered with your general activity?  5    REVIEW OF PERTINENT MEDICAL DATA  None    The following portions of the patient's history were reviewed and updated as appropriate: allergies, current medications, past family history, past medical history, past social history, past surgical history and problem list.    Review of Systems   Constitutional: Negative for fatigue.   HENT: Negative for congestion.    Eyes: Negative for visual disturbance.   Respiratory: Negative for shortness of breath.   "  Cardiovascular: Negative for chest pain.   Gastrointestinal: Positive for constipation. Negative for diarrhea.   Genitourinary: Negative for difficulty urinating.   Musculoskeletal: Positive for back pain.   Neurological: Positive for weakness. Negative for numbness.   Psychiatric/Behavioral: Negative for sleep disturbance and suicidal ideas.     I have reviewed and confirmed the accuracy of the ROS as documented by the MA/LPN/RN Laure Dang MD    Vitals:    01/19/21 1524   BP: 158/83   Pulse: 89   Resp: 18   Temp: 97.1 °F (36.2 °C)   SpO2: 95%   Weight: 59.6 kg (131 lb 6.4 oz)   Height: 156.2 cm (61.5\")   PainSc:   5   PainLoc: Back         Objective   Physical Exam  Vitals signs reviewed.   Pulmonary:      Effort: Pulmonary effort is normal. No respiratory distress.   Musculoskeletal:      Comments: Positive left straight leg test.    Skin:     General: Skin is warm and dry.   Neurological:      General: No focal deficit present.      Mental Status: She is alert. Mental status is at baseline.   Psychiatric:         Mood and Affect: Mood normal.         Thought Content: Thought content normal.             Assessment/Plan   Diagnoses and all orders for this visit:    1. Sacroiliitis (CMS/HCC) (Primary)    2. Spinal stenosis of lumbar region without neurogenic claudication    3. Lumbar radiculopathy    4. Lumbar facet arthropathy    5. Osteoporosis, unspecified osteoporosis type, unspecified pathological fracture presence        - May consider repeat sacroiliac joint injection bilaterally (left side is now hurting) versus epidural injection according to her presentation at her next visit.   - Advised to continue physical therapy.    - She has made progress with her pain at this point.  Her daughter states that she no longer complains about her back hurting.   - Recommended Voltaren gel over the painful area on her back/hip as needed for pain.  Risks discussed.      --- Follow-up 2-3 months office visit.      "       DIGNA REPORT    As the clinician, I personally reviewed the DIGNA from 1/19/21 .      While examining this patient, I wore protective equipment including a mask, eye shield and gloves.  I washed my hands before and after this patient encounter.  The patient wore a mask throughout the visit as well.     Laure Dang MD  Pain Management

## 2021-01-19 NOTE — PROGRESS NOTES
Physical Therapy Daily Progress Note    Patient: Nyasia Dalal   : 1933  Diagnosis/ICD-10 Code:  SI (sacroiliac) joint dysfunction [M53.3]  Referring practitioner: EHSAN Kuo  Date of Initial Visit: Type: THERAPY  Noted: 2021  Today's Date: 2021  Patient seen for 3 sessions         Nyasia Dalal reports: still has complaint of L hip discomfort and L LE tingling when standing.    Subjective     Objective   See Exercise, Manual, and Modality Logs for complete treatment.       Assessment/Plan   Compliant and cooperative with rehab efforts. Subjectively, pt reports no increase of pain or discomfort with interventions performed today. Performed well with interventions performed in shorter session today due to patient arriving late for appointment. Continues to complain of pain in L hip especially with standing exercises and abduction. Pain was noted with bridges and supine hip abduction so those were deferred today.  Continues to benefit from verbal/tactile cues to ensure proper form and technique for exercise performance.       Progress per Plan of Care           Manual Therapy:    8     mins  06629;  Therapeutic Exercise:    22     mins  87836;     Neuromuscular Ethan:        mins  18609;    Therapeutic Activity:          mins  31447;     Gait Training:           mins  83053;     Ultrasound:          mins  24072;    Electrical Stimulation:         mins  37428 ( );  Dry Needling          mins self-pay    Timed Treatment:   30   mins   Total Treatment:     30   mins    Ella Veliz PTA  Physical Therapist Assistant [id number]

## 2021-01-22 ENCOUNTER — TREATMENT (OUTPATIENT)
Dept: PHYSICAL THERAPY | Facility: CLINIC | Age: 86
End: 2021-01-22

## 2021-01-22 DIAGNOSIS — M48.061 SPINAL STENOSIS OF LUMBAR REGION WITHOUT NEUROGENIC CLAUDICATION: ICD-10-CM

## 2021-01-22 DIAGNOSIS — M47.816 LUMBAR FACET ARTHROPATHY: ICD-10-CM

## 2021-01-22 DIAGNOSIS — M53.3 SI (SACROILIAC) JOINT DYSFUNCTION: Primary | ICD-10-CM

## 2021-01-22 PROCEDURE — 97110 THERAPEUTIC EXERCISES: CPT | Performed by: PHYSICAL THERAPIST

## 2021-01-22 PROCEDURE — G0283 ELEC STIM OTHER THAN WOUND: HCPCS | Performed by: PHYSICAL THERAPIST

## 2021-01-26 ENCOUNTER — TREATMENT (OUTPATIENT)
Dept: PHYSICAL THERAPY | Facility: CLINIC | Age: 86
End: 2021-01-26

## 2021-01-26 DIAGNOSIS — M48.061 SPINAL STENOSIS OF LUMBAR REGION WITHOUT NEUROGENIC CLAUDICATION: ICD-10-CM

## 2021-01-26 DIAGNOSIS — M53.3 SI (SACROILIAC) JOINT DYSFUNCTION: Primary | ICD-10-CM

## 2021-01-26 DIAGNOSIS — M47.816 LUMBAR FACET ARTHROPATHY: ICD-10-CM

## 2021-01-26 PROCEDURE — G0283 ELEC STIM OTHER THAN WOUND: HCPCS | Performed by: PHYSICAL THERAPIST

## 2021-01-26 PROCEDURE — 97110 THERAPEUTIC EXERCISES: CPT | Performed by: PHYSICAL THERAPIST

## 2021-01-26 PROCEDURE — 97112 NEUROMUSCULAR REEDUCATION: CPT | Performed by: PHYSICAL THERAPIST

## 2021-01-26 PROCEDURE — 97010 HOT OR COLD PACKS THERAPY: CPT | Performed by: PHYSICAL THERAPIST

## 2021-01-26 NOTE — PROGRESS NOTES
Physical Therapy Daily Progress Note    Patient: Nyasia Dalal   : 1933  Diagnosis/ICD-10 Code:  SI (sacroiliac) joint dysfunction [M53.3]  Referring practitioner: EHSAN Kuo  Date of Initial Visit: Type: THERAPY  Noted: 2021  Today's Date: 2021  Patient seen for 5 sessions           Subjective It's there. 5/10 in back and down leg. The electrodes they used last time helped.     Pt arrived at 3:29pm  for 3:15pm appt.    Objective   See Exercise, Manual, and Modality Logs for complete treatment.   Pt with slow verbal response to questions.     Assessment/Plan  Added standing asymmetrical flexion of UE/LE for coordination/balance work. Pt able to tolerate modified bridging today.          Timed:    Manual Therapy:         mins  84578;  Therapeutic Exercise:   16  mins  36026;     Neuromuscular Ethan:    14    mins  81330;    Therapeutic Activity:          mins  09551;     Gait Training:           mins  20273;     Ultrasound:          mins  33729;    Electrical Stimulation:         mins  29238 ( );  Iontophoresis         mins 99633;  Aquatic Therapy         mins 67067;  Dry Needling                   mins    Untimed:  Electrical Stimulation:    15     mins  63246 (MC );  Mechanical Traction:         mins  95884;     Timed Treatment:   30   mins   Total Treatment:     45   mins  Jigna Sneed, PT  Physical Therapist

## 2021-01-29 ENCOUNTER — TREATMENT (OUTPATIENT)
Dept: PHYSICAL THERAPY | Facility: CLINIC | Age: 86
End: 2021-01-29

## 2021-01-29 DIAGNOSIS — M47.816 LUMBAR FACET ARTHROPATHY: ICD-10-CM

## 2021-01-29 DIAGNOSIS — M48.061 SPINAL STENOSIS OF LUMBAR REGION WITHOUT NEUROGENIC CLAUDICATION: ICD-10-CM

## 2021-01-29 DIAGNOSIS — M53.3 SI (SACROILIAC) JOINT DYSFUNCTION: Primary | ICD-10-CM

## 2021-01-29 PROCEDURE — 97010 HOT OR COLD PACKS THERAPY: CPT | Performed by: PHYSICAL THERAPIST

## 2021-01-29 PROCEDURE — 97110 THERAPEUTIC EXERCISES: CPT | Performed by: PHYSICAL THERAPIST

## 2021-01-29 PROCEDURE — G0283 ELEC STIM OTHER THAN WOUND: HCPCS | Performed by: PHYSICAL THERAPIST

## 2021-01-29 NOTE — PROGRESS NOTES
Physical Therapy Daily Progress Note    Patient: Nyasia Dalal   : 1933  Diagnosis/ICD-10 Code:  SI (sacroiliac) joint dysfunction [M53.3]  Referring practitioner: EHSAN Kuo  Date of Initial Visit: Type: THERAPY  Noted: 2021  Today's Date: 2021  Patient seen for 6 sessions           Subjective I'm doing ok, hard to get out of bed today. Tired. Not much pain right now- I do have tingling.     Objective   See Exercise, Manual, and Modality Logs for complete treatment.       Assessment/Plan  Added marching in // bars and toe tapping for balance and hip flexion. Bridging was painful after 10+ reps so transitioned to modified bridging.          Timed:    Manual Therapy:    3    mins  39990;  Therapeutic Exercise:    25    mins  88767;     Neuromuscular Ethan:        mins  23029;    Therapeutic Activity:          mins  78708;     Gait Training:           mins  12911;     Ultrasound:          mins  91440;    Electrical Stimulation:         mins  07155 ( );  Iontophoresis         mins 21318;  Aquatic Therapy         mins 74027;  Dry Needling                   mins    Untimed:  Electrical Stimulation:    15     mins  06692 (MC );  Mechanical Traction:         mins  65334;     Timed Treatment:   28   mins   Total Treatment:     45   mins  Jigna Sneed PT  Physical Therapist

## 2021-02-02 ENCOUNTER — TREATMENT (OUTPATIENT)
Dept: PHYSICAL THERAPY | Facility: CLINIC | Age: 86
End: 2021-02-02

## 2021-02-02 DIAGNOSIS — M47.816 LUMBAR FACET ARTHROPATHY: ICD-10-CM

## 2021-02-02 DIAGNOSIS — M48.061 SPINAL STENOSIS OF LUMBAR REGION WITHOUT NEUROGENIC CLAUDICATION: ICD-10-CM

## 2021-02-02 DIAGNOSIS — M53.3 SI (SACROILIAC) JOINT DYSFUNCTION: Primary | ICD-10-CM

## 2021-02-02 PROCEDURE — 97530 THERAPEUTIC ACTIVITIES: CPT | Performed by: PHYSICAL THERAPIST

## 2021-02-02 PROCEDURE — 97110 THERAPEUTIC EXERCISES: CPT | Performed by: PHYSICAL THERAPIST

## 2021-02-02 NOTE — PROGRESS NOTES
"   Physical Therapy Daily Progress Note    Patient: Nyasia Dalal   : 1933  Diagnosis/ICD-10 Code:  SI (sacroiliac) joint dysfunction [M53.3]  Referring practitioner: EHSAN Kuo  Date of Initial Visit: Type: THERAPY  Noted: 2021  Today's Date: 2021  Patient seen for 7 sessions         Nyasia Dalal reports: no new complaints. \"I feel okay, my legs are tingling, they tingle every time I stand up.\"    Subjective     Objective   See Exercise, Manual, and Modality Logs for complete treatment.       Assessment/Plan  Compliant and cooperative with rehab efforts. Subjectively, pt reports no increase of pain or discomfort with interventions performed today. Performed well with mirroring my movements with exercises. Pt was fatigued but reported no pain post treatment session today. Continues to benefit from verbal/tactile cues to ensure proper form and technique for exercise performance.     Progress per Plan of Care           Manual Therapy:    3     mins  32714;  Therapeutic Exercise:    20     mins  20989;     Neuromuscular Ethan:        mins  53094;    Therapeutic Activity:     15     mins  38957;     Gait Training:           mins  33551;     Ultrasound:          mins  25398;    Electrical Stimulation:         mins  30802 ( );  Dry Needling          mins self-pay    Timed Treatment:  38    mins   Total Treatment:     48   mins    Ella Veliz PTA  Physical Therapist Assistant [id number]    "

## 2021-02-04 ENCOUNTER — TREATMENT (OUTPATIENT)
Dept: PHYSICAL THERAPY | Facility: CLINIC | Age: 86
End: 2021-02-04

## 2021-02-04 DIAGNOSIS — M47.816 LUMBAR FACET ARTHROPATHY: ICD-10-CM

## 2021-02-04 DIAGNOSIS — M53.3 SI (SACROILIAC) JOINT DYSFUNCTION: Primary | ICD-10-CM

## 2021-02-04 DIAGNOSIS — M48.061 SPINAL STENOSIS OF LUMBAR REGION WITHOUT NEUROGENIC CLAUDICATION: ICD-10-CM

## 2021-02-04 PROCEDURE — 97530 THERAPEUTIC ACTIVITIES: CPT | Performed by: PHYSICAL THERAPIST

## 2021-02-04 PROCEDURE — 97110 THERAPEUTIC EXERCISES: CPT | Performed by: PHYSICAL THERAPIST

## 2021-02-04 NOTE — PROGRESS NOTES
Physical Therapy Daily Progress Note    Patient: Nyasia Dalal   : 1933  Diagnosis/ICD-10 Code:  SI (sacroiliac) joint dysfunction [M53.3]  Referring practitioner: EHSAN Kuo  Date of Initial Visit: Type: THERAPY  Noted: 2021  Today's Date: 2021  Patient seen for 8 sessions         Nyasia Dalal reports: She was sore after last session in her legs and in her back. Feels better today.    Subjective     Objective   -Added LTR and UTR to HEP, given handout.  See Exercise, Manual, and Modality Logs for complete treatment.       Assessment/Plan  Compliant and cooperative with rehab efforts. Subjectively, pt reports no increase of pain or discomfort with interventions performed today. Performed well with all interventions with reports of muscle fatigue by the end of session. Continues to demonstrate LE and core weakness.  Continues to benefit from verbal/tactile cues to ensure proper form and technique for exercise performance.     Progress per Plan of Care           Manual Therapy:    3     mins  66354;  Therapeutic Exercise:    20     mins  19029;     Neuromuscular Ethan:        mins  02323;    Therapeutic Activity:     15     mins  48398;     Gait Training:           mins  33001;     Ultrasound:          mins  76313;    Electrical Stimulation:         mins  20179 ( );  Dry Needling          mins self-pay    Timed Treatment:  38    mins   Total Treatment:     48   mins    Ella Veliz PTA  Physical Therapist Assistant [id number]

## 2021-02-09 ENCOUNTER — TREATMENT (OUTPATIENT)
Dept: PHYSICAL THERAPY | Facility: CLINIC | Age: 86
End: 2021-02-09

## 2021-02-09 DIAGNOSIS — M47.816 LUMBAR FACET ARTHROPATHY: ICD-10-CM

## 2021-02-09 DIAGNOSIS — M48.061 SPINAL STENOSIS OF LUMBAR REGION WITHOUT NEUROGENIC CLAUDICATION: ICD-10-CM

## 2021-02-09 DIAGNOSIS — M53.3 SI (SACROILIAC) JOINT DYSFUNCTION: Primary | ICD-10-CM

## 2021-02-09 PROCEDURE — 97110 THERAPEUTIC EXERCISES: CPT | Performed by: PHYSICAL THERAPIST

## 2021-02-09 PROCEDURE — 97530 THERAPEUTIC ACTIVITIES: CPT | Performed by: PHYSICAL THERAPIST

## 2021-02-09 NOTE — PROGRESS NOTES
Physical Therapy Daily Progress Note    Patient: Nyasia Dalal   : 1933  Diagnosis/ICD-10 Code:  SI (sacroiliac) joint dysfunction [M53.3]  Referring practitioner: EHSAN Kuo  Date of Initial Visit: Type: THERAPY  Noted: 2021  Today's Date: 2021  Patient seen for 9 sessions         Nyasia Dalal reports: no new complaints.    Subjective     Objective   See Exercise, Manual, and Modality Logs for complete treatment.       Assessment/Plan  Compliant and cooperative with rehab efforts. Subjectively, pt reports no increase of pain or discomfort with interventions performed today. Performed well with LE strengthening exercises with better recall for exercise performance. Pain/cramping in L hamstring while performing bridges, corrected with manual hamstring stretch and self piriformis stretch. Continues to benefit from verbal/tactile cues to ensure proper form and technique for exercise performance.     Progress per Plan of Care           Manual Therapy:         mins  03907;  Therapeutic Exercise:    20     mins  44217;     Neuromuscular Ethan:        mins  86755;    Therapeutic Activity:     15     mins  93564;     Gait Training:           mins  19874;     Ultrasound:          mins  53731;    Electrical Stimulation:         mins  77821 ( );  Dry Needling          mins self-pay    Timed Treatment:   35   mins   Total Treatment:     45   mins    Ella Veliz PTA  Physical Therapist Assistant [id number]

## 2021-03-13 ENCOUNTER — APPOINTMENT (OUTPATIENT)
Dept: CT IMAGING | Facility: HOSPITAL | Age: 86
End: 2021-03-13

## 2021-03-13 ENCOUNTER — HOSPITAL ENCOUNTER (EMERGENCY)
Facility: HOSPITAL | Age: 86
Discharge: HOME | End: 2021-03-13
Attending: EMERGENCY MEDICINE | Admitting: EMERGENCY MEDICINE

## 2021-03-13 VITALS
HEART RATE: 76 BPM | BODY MASS INDEX: 25.3 KG/M2 | TEMPERATURE: 97.9 F | SYSTOLIC BLOOD PRESSURE: 127 MMHG | HEIGHT: 61 IN | WEIGHT: 134 LBS | RESPIRATION RATE: 16 BRPM | DIASTOLIC BLOOD PRESSURE: 69 MMHG | OXYGEN SATURATION: 94 %

## 2021-03-13 DIAGNOSIS — K57.90 DIVERTICULOSIS: ICD-10-CM

## 2021-03-13 DIAGNOSIS — K62.5 RECTAL BLEEDING: Primary | ICD-10-CM

## 2021-03-13 LAB
ABO GROUP BLD: NORMAL
ALBUMIN SERPL-MCNC: 4.3 G/DL (ref 3.5–5.2)
ALBUMIN/GLOB SERPL: 1.7 G/DL
ALP SERPL-CCNC: 74 U/L (ref 39–117)
ALT SERPL W P-5'-P-CCNC: 47 U/L (ref 1–33)
ANION GAP SERPL CALCULATED.3IONS-SCNC: 10 MMOL/L (ref 5–15)
ANTI-C: NORMAL
ANTI-D: NORMAL
AST SERPL-CCNC: 105 U/L (ref 1–32)
BASOPHILS # BLD AUTO: 0.05 10*3/MM3 (ref 0–0.2)
BASOPHILS NFR BLD AUTO: 0.4 % (ref 0–1.5)
BILIRUB SERPL-MCNC: 0.7 MG/DL (ref 0–1.2)
BLD GP AB SCN SERPL QL: POSITIVE
BUN SERPL-MCNC: 26 MG/DL (ref 8–23)
BUN/CREAT SERPL: 31.7 (ref 7–25)
C AG RBC QL: NEGATIVE
CALCIUM SPEC-SCNC: 8.9 MG/DL (ref 8.6–10.5)
CHLORIDE SERPL-SCNC: 104 MMOL/L (ref 98–107)
CO2 SERPL-SCNC: 27 MMOL/L (ref 22–29)
CREAT SERPL-MCNC: 0.82 MG/DL (ref 0.57–1)
DEPRECATED RDW RBC AUTO: 41 FL (ref 37–54)
EOSINOPHIL # BLD AUTO: 0.03 10*3/MM3 (ref 0–0.4)
EOSINOPHIL NFR BLD AUTO: 0.2 % (ref 0.3–6.2)
ERYTHROCYTE [DISTWIDTH] IN BLOOD BY AUTOMATED COUNT: 11.8 % (ref 12.3–15.4)
GFR SERPL CREATININE-BSD FRML MDRD: 66 ML/MIN/1.73
GLOBULIN UR ELPH-MCNC: 2.6 GM/DL
GLUCOSE SERPL-MCNC: 98 MG/DL (ref 65–99)
HCT VFR BLD AUTO: 34.8 % (ref 34–46.6)
HGB BLD-MCNC: 11.6 G/DL (ref 12–15.9)
IMM GRANULOCYTES # BLD AUTO: 0.06 10*3/MM3 (ref 0–0.05)
IMM GRANULOCYTES NFR BLD AUTO: 0.5 % (ref 0–0.5)
LYMPHOCYTES # BLD AUTO: 1.61 10*3/MM3 (ref 0.7–3.1)
LYMPHOCYTES NFR BLD AUTO: 13.2 % (ref 19.6–45.3)
MAGNESIUM SERPL-MCNC: 1.8 MG/DL (ref 1.6–2.4)
MCH RBC QN AUTO: 31.9 PG (ref 26.6–33)
MCHC RBC AUTO-ENTMCNC: 33.3 G/DL (ref 31.5–35.7)
MCV RBC AUTO: 95.6 FL (ref 79–97)
MONOCYTES # BLD AUTO: 0.91 10*3/MM3 (ref 0.1–0.9)
MONOCYTES NFR BLD AUTO: 7.5 % (ref 5–12)
NEUTROPHILS NFR BLD AUTO: 78.2 % (ref 42.7–76)
NEUTROPHILS NFR BLD AUTO: 9.51 10*3/MM3 (ref 1.7–7)
NRBC BLD AUTO-RTO: 0 /100 WBC (ref 0–0.2)
PLATELET # BLD AUTO: 276 10*3/MM3 (ref 140–450)
PMV BLD AUTO: 10.1 FL (ref 6–12)
POTASSIUM SERPL-SCNC: 4 MMOL/L (ref 3.5–5.2)
PROT SERPL-MCNC: 6.9 G/DL (ref 6–8.5)
RBC # BLD AUTO: 3.64 10*6/MM3 (ref 3.77–5.28)
RH BLD: NEGATIVE
SODIUM SERPL-SCNC: 141 MMOL/L (ref 136–145)
T&S EXPIRATION DATE: NORMAL
TROPONIN T SERPL-MCNC: 0.04 NG/ML (ref 0–0.03)
TROPONIN T SERPL-MCNC: 0.04 NG/ML (ref 0–0.03)
WBC # BLD AUTO: 12.17 10*3/MM3 (ref 3.4–10.8)

## 2021-03-13 PROCEDURE — 80053 COMPREHEN METABOLIC PANEL: CPT | Performed by: EMERGENCY MEDICINE

## 2021-03-13 PROCEDURE — 70450 CT HEAD/BRAIN W/O DYE: CPT

## 2021-03-13 PROCEDURE — 86905 BLOOD TYPING RBC ANTIGENS: CPT | Performed by: EMERGENCY MEDICINE

## 2021-03-13 PROCEDURE — 86900 BLOOD TYPING SEROLOGIC ABO: CPT | Performed by: EMERGENCY MEDICINE

## 2021-03-13 PROCEDURE — 84484 ASSAY OF TROPONIN QUANT: CPT | Performed by: EMERGENCY MEDICINE

## 2021-03-13 PROCEDURE — 85025 COMPLETE CBC W/AUTO DIFF WBC: CPT | Performed by: EMERGENCY MEDICINE

## 2021-03-13 PROCEDURE — 86850 RBC ANTIBODY SCREEN: CPT | Performed by: EMERGENCY MEDICINE

## 2021-03-13 PROCEDURE — 25010000002 IOPAMIDOL 61 % SOLUTION: Performed by: EMERGENCY MEDICINE

## 2021-03-13 PROCEDURE — 99283 EMERGENCY DEPT VISIT LOW MDM: CPT

## 2021-03-13 PROCEDURE — 74177 CT ABD & PELVIS W/CONTRAST: CPT

## 2021-03-13 PROCEDURE — 83735 ASSAY OF MAGNESIUM: CPT | Performed by: EMERGENCY MEDICINE

## 2021-03-13 PROCEDURE — 86901 BLOOD TYPING SEROLOGIC RH(D): CPT | Performed by: EMERGENCY MEDICINE

## 2021-03-13 PROCEDURE — 86870 RBC ANTIBODY IDENTIFICATION: CPT | Performed by: EMERGENCY MEDICINE

## 2021-03-13 RX ORDER — ACETAMINOPHEN 325 MG/1
650 TABLET ORAL EVERY 6 HOURS PRN
COMMUNITY
End: 2021-06-22

## 2021-03-13 RX ORDER — NAPROXEN SODIUM 220 MG
220 TABLET ORAL 2 TIMES DAILY PRN
COMMUNITY
End: 2022-09-18 | Stop reason: HOSPADM

## 2021-03-13 RX ADMIN — IOPAMIDOL 85 ML: 612 INJECTION, SOLUTION INTRAVENOUS at 14:37

## 2021-03-13 RX ADMIN — SODIUM CHLORIDE 500 ML: 9 INJECTION, SOLUTION INTRAVENOUS at 14:13

## 2021-03-13 NOTE — ED TRIAGE NOTES
Pt fell yesterday unknown cause of fall, pt lives at home by her self, typically walks independently. Pt reports BLE pain, pt has bright red blood in stool started yesterday. Patient masked in first look triage. I was wearing mask and goggles.

## 2021-03-13 NOTE — DISCHARGE INSTRUCTIONS
Drink plenty of water, continue current medications, follow-up with your primary care provider early this week for recheck, ED return for worsening symptoms as needed.

## 2021-03-13 NOTE — ED PROVIDER NOTES
EMERGENCY DEPARTMENT ENCOUNTER    Room Number:  29/29  Date of encounter:  3/13/2021  PCP: Guilherme Oglesby MD  Historian: Patient, son      HPI:  Chief Complaint: Fall, rectal bleeding  A complete HPI/ROS/PMH/PSH/SH/FH are unobtainable due to: None    Context: Nyasia Dalal is a 87 y.o. female who presents to the ED via private vehicle for evaluation after patient sustained an unwitnessed fall yesterday.  Patient states that she was outside watering the bath deals, states that she is not sure how she fell.  Her son states that she was on the ground out there for several hours before he got home to help her get up off the ground.  States that she has been ambulatory under her own power since then.  She reports some vague stiffness and soreness in her legs bilaterally but no focal point of pain.  Denies any dizziness, redness, numbness or weakness in the arms or legs, chest pain or shortness of breath, abdominal pain, nausea, vomiting.  Some superficial scrapes to her head and face, denies any headache or neck pain.  Not on any anticoagulation.  Dates that she had a small amount of bright red blood per rectum started yesterday into this morning, reports history of this in the past.      MEDICAL RECORD REVIEW    Medical allergies to latex listed on chart review in epic    PAST MEDICAL HISTORY  Active Ambulatory Problems     Diagnosis Date Noted   • Osteoporosis 08/30/2016   • Spinal stenosis of lumbar region without neurogenic claudication 02/26/2019   • Other chronic pain 11/02/2020   • Sacroiliac joint dysfunction of right side 11/02/2020   • Lumbar facet arthropathy 11/02/2020     Resolved Ambulatory Problems     Diagnosis Date Noted   • No Resolved Ambulatory Problems     Past Medical History:   Diagnosis Date   • Breast cancer (CMS/[facility]) 04/21/2014   • Irritable bowel syndrome    • Skin cancer          PAST SURGICAL HISTORY  Past Surgical History:   Procedure Laterality Date   • BREAST LUMPECTOMY Left    •  CATARACT EXTRACTION EXTRACAPSULAR W/ INTRAOCULAR LENS IMPLANTATION Bilateral    • COLONOSCOPY     • EYE SURGERY     • SKIN CANCER EXCISION  06/26/2012         FAMILY HISTORY  Family History   Problem Relation Age of Onset   • Kidney failure Father          SOCIAL HISTORY  Social History     Socioeconomic History   • Marital status:      Spouse name: Not on file   • Number of children: 3   • Years of education: HS   • Highest education level: Not on file   Tobacco Use   • Smoking status: Never Smoker   • Smokeless tobacco: Never Used   Vaping Use   • Vaping Use: Never used   Substance and Sexual Activity   • Alcohol use: Not Currently   • Drug use: No   • Sexual activity: Defer         ALLERGIES  Latex        REVIEW OF SYSTEMS  Review of Systems     All systems reviewed and negative except for those discussed in HPI.       PHYSICAL EXAM    I have reviewed the triage vital signs and nursing notes.    ED Triage Vitals [03/13/21 1233]   Temp Heart Rate Resp BP SpO2   97.9 °F (36.6 °C) 85 18 -- 95 %      Temp src Heart Rate Source Patient Position BP Location FiO2 (%)   Tympanic Monitor -- -- --       Physical Exam  General: Awake, alert, no acute distress  HEENT: Mucous membranes moist, atraumatic, EOMI, no significant conjunctival pallor  Neck: Full ROM  Pulm: Symmetric chest rise, nonlabored, lungs CTAB  Cardiovascular: Regular rate and rhythm, intact distal pulses  GI: Soft, nontender, nondistended, no rebound, no guarding, bowel sounds present  MSK: Full ROM of the legs bilaterally with no pain to palpation of the hips with no shortening or malrotation of the hips, no deformity  Skin: Warm, dry  Neuro: Alert and oriented x 3, GCS 15, moving all extremities, no focal deficits  Psych: Calm, cooperative      Surgical mask, protective eye goggles, and gloves used during this encounter. Patient in surgical mask.      LAB RESULTS  Recent Results (from the past 24 hour(s))   Comprehensive Metabolic Panel     Collection Time: 03/13/21  1:17 PM    Specimen: Blood   Result Value Ref Range    Glucose 98 65 - 99 mg/dL    BUN 26 (H) 8 - 23 mg/dL    Creatinine 0.82 0.57 - 1.00 mg/dL    Sodium 141 136 - 145 mmol/L    Potassium 4.0 3.5 - 5.2 mmol/L    Chloride 104 98 - 107 mmol/L    CO2 27.0 22.0 - 29.0 mmol/L    Calcium 8.9 8.6 - 10.5 mg/dL    Total Protein 6.9 6.0 - 8.5 g/dL    Albumin 4.30 3.50 - 5.20 g/dL    ALT (SGPT) 47 (H) 1 - 33 U/L    AST (SGOT) 105 (H) 1 - 32 U/L    Alkaline Phosphatase 74 39 - 117 U/L    Total Bilirubin 0.7 0.0 - 1.2 mg/dL    eGFR Non African Amer 66 >60 mL/min/1.73    Globulin 2.6 gm/dL    A/G Ratio 1.7 g/dL    BUN/Creatinine Ratio 31.7 (H) 7.0 - 25.0    Anion Gap 10.0 5.0 - 15.0 mmol/L   Type & Screen    Collection Time: 03/13/21  1:17 PM    Specimen: Blood   Result Value Ref Range    ABO Type O     RH type Negative     Antibody Screen Positive     T&S Expiration Date 3/16/2021 11:59:59 PM    Magnesium    Collection Time: 03/13/21  1:17 PM    Specimen: Blood   Result Value Ref Range    Magnesium 1.8 1.6 - 2.4 mg/dL   CBC Auto Differential    Collection Time: 03/13/21  1:17 PM    Specimen: Blood   Result Value Ref Range    WBC 12.17 (H) 3.40 - 10.80 10*3/mm3    RBC 3.64 (L) 3.77 - 5.28 10*6/mm3    Hemoglobin 11.6 (L) 12.0 - 15.9 g/dL    Hematocrit 34.8 34.0 - 46.6 %    MCV 95.6 79.0 - 97.0 fL    MCH 31.9 26.6 - 33.0 pg    MCHC 33.3 31.5 - 35.7 g/dL    RDW 11.8 (L) 12.3 - 15.4 %    RDW-SD 41.0 37.0 - 54.0 fl    MPV 10.1 6.0 - 12.0 fL    Platelets 276 140 - 450 10*3/mm3    Neutrophil % 78.2 (H) 42.7 - 76.0 %    Lymphocyte % 13.2 (L) 19.6 - 45.3 %    Monocyte % 7.5 5.0 - 12.0 %    Eosinophil % 0.2 (L) 0.3 - 6.2 %    Basophil % 0.4 0.0 - 1.5 %    Immature Grans % 0.5 0.0 - 0.5 %    Neutrophils, Absolute 9.51 (H) 1.70 - 7.00 10*3/mm3    Lymphocytes, Absolute 1.61 0.70 - 3.10 10*3/mm3    Monocytes, Absolute 0.91 (H) 0.10 - 0.90 10*3/mm3    Eosinophils, Absolute 0.03 0.00 - 0.40 10*3/mm3    Basophils,  Absolute 0.05 0.00 - 0.20 10*3/mm3    Immature Grans, Absolute 0.06 (H) 0.00 - 0.05 10*3/mm3    nRBC 0.0 0.0 - 0.2 /100 WBC   Troponin    Collection Time: 03/13/21  1:17 PM    Specimen: Blood   Result Value Ref Range    Troponin T 0.044 (C) 0.000 - 0.030 ng/mL   Patient Antigen Type    Collection Time: 03/13/21  1:17 PM    Specimen: Blood   Result Value Ref Range    BIG C Negative    Antibody Identification    Collection Time: 03/13/21  1:17 PM    Specimen: Blood   Result Value Ref Range    Anti-C ANTI-C     Anti-D ANTI-D    Troponin    Collection Time: 03/13/21  3:38 PM    Specimen: Blood   Result Value Ref Range    Troponin T 0.039 (C) 0.000 - 0.030 ng/mL       Ordered the above labs and independently reviewed the results.        RADIOLOGY  CT Head Without Contrast    Result Date: 3/13/2021  CT HEAD WO CONTRAST-  CLINICAL HISTORY: Patient fell. Head injury. Confusion.  TECHNIQUE: Transverse 3 mm thick images were acquired from the base of the skull to the vertex without IV contrast.  Radiation dose reduction techniques were utilized, including automated exposure control and exposure modulation based on body size.  COMPARISON: MRI of the brain dated 01/08/2019.  FINDINGS: There is mild diffuse cortical atrophy. There is ill-defined abnormal diminished attenuation throughout the white matter of both cerebral hemispheres that is consistent with sequela of moderately extensive small vessel chronic ischemic change. There is no evidence of acute infarct or hemorrhage. There is no mass effect. Visualized paranasal sinuses are well aerated. Window images demonstrate no evidence of skull fracture.      Cortical atrophy and evidence of moderately extensive small vessel chronic ischemic change as noted. Otherwise unremarkable CT scan of the head.  This report was finalized on 3/13/2021 2:10 PM by Dr. Alexis Christopher M.D.      CT Abdomen Pelvis With Contrast    Result Date: 3/13/2021  CT ABDOMEN PELVIS W CONTRAST-  CLINICAL  HISTORY: Abdominal discomfort. Rectal bleeding. Leukocytosis.  TECHNIQUE: Spiral CT images were acquired through the abdomen and pelvis with IV contrast only and were reconstructed in 3 mm thick slices.  Radiation dose reduction techniques were utilized, including automated exposure control and exposure modulation based on body size.  COMPARISON: None  FINDINGS: There is a tiny cyst in the lateral segment of the left lobe of the liver. The liver is otherwise unremarkable. A solitary tiny cortical cyst is also noted in the upper pole of the right kidney. The kidneys are otherwise unremarkable. Spleen and pancreas and adrenal glands appear normal. The stomach and small bowel appear within normal limits. There is no bowel distention. Numerous diverticula are present in the sigmoid colon. There is no CT evidence of diverticulitis. No abnormal masses or fluid collections are identified in the abdomen or pelvis. There is a chronic compression fracture of the L3 vertebral body that, along with facet joint arthropathy and thickening of the ligamentum flavum produces severe focal narrowing of the spinal canal at L2-L3. Moderately severe canal stenosis is also present at L3-L4.      Extensive sigmoid diverticulosis without evidence of diverticulitis. Otherwise unremarkable CT imaging of the abdomen and pelvis.  This report was finalized on 3/13/2021 3:02 PM by Dr. Alexis Christopher M.D.        I ordered the above noted radiological studies. Reviewed by me.  See dictation for official radiology interpretation.      PROCEDURES    Procedures      MEDICATIONS GIVEN IN ER    Medications   sodium chloride 0.9 % bolus 500 mL (0 mL Intravenous Stopped 3/13/21 1537)   iopamidol (ISOVUE-300) 61 % injection 100 mL (85 mL Intravenous Given by Other 3/13/21 1437)         PROGRESS, DATA ANALYSIS, CONSULTS, AND MEDICAL DECISION MAKING    All labs have been independently reviewed by me.  All radiology studies have been reviewed by me and  discussed with radiologist dictating the report.   EKG's independently viewed and interpreted by me.  Discussion below represents my analysis of pertinent findings related to patient's condition, differential diagnosis, treatment plan and final disposition.    Initial concern for possible dehydration, renal failure, electrolyte abnormality, significant GI bleed, among others.    ED Course as of Mar 13 1919   Sat Mar 13, 2021   1315 Patient with a small anal fissure at 6 o'clock with mild jhonny-anal erythema (not cellulitis), no gross blood or melena noted, chaperone with RN Yolanda Alvarado at bedside    [DC]   1359 WBC(!): 12.17 [DC]   1359 Hemoglobin(!): 11.6 [DC]   1359 Creatinine: 0.82 [DC]   1502 Discussed CT abdomen pelvis with Dr. Christopher, radiologist, patient has diverticulosis without diverticulitis, no other acute emergent findings.    [DC]   1503 No prior    Troponin T(!!): 0.044 [DC]   1607 Second troponin is downtrending, patient has no chest pains, I suspect it slightly elevated from the fall and the light GI bleeding.  I do not see any evidence of any concerning emergent GI bleed at this time with no anemia.  Likely secondary to her diverticulosis versus internal hemorrhoid.  I do feel she is safe for discharge with close outpatient follow-up.  Immediate return emergency department for any developing chest pains, shortness breath, dizziness, worsening GI bleeding, severe abdominal pains, altered mental status, among others.   Troponin T(!!): 0.039 [DC]      ED Course User Index  [DC] Javon Montague MD       AS OF 19:19 EST VITALS:    BP - 127/69  HR - 76  TEMP - 97.9 °F (36.6 °C) (Tympanic)  02 SATS - 94%        DIAGNOSIS  Final diagnoses:   Rectal bleeding   Diverticulosis         DISPOSITION  DISCHARGE    Patient discharged in stable condition.    Reviewed implications of results, diagnosis, meds, responsibility to follow up, warning signs and symptoms of possible worsening, potential complications  and reasons to return to ER.    Patient/Family voiced understanding of above instructions.    Discussed plan for discharge, as there is no emergent indication for admission. Patient referred to primary care provider for BP management due to today's BP. Pt/family is agreeable and understands need for follow up and repeat testing.  Pt is aware that discharge does not mean that nothing is wrong but it indicates no emergency is present that requires admission and they must continue care with follow-up as given below or physician of their choice.     FOLLOW-UP  [facility] Emergency Department  [address] [facility] [address] [phone]    As needed, If symptoms worsen    Guilherme Oglesby MD  [id number] [address]  [facility] [id number] [phone]    Schedule an appointment as soon as possible for a visit   As needed         Medication List      No changes were made to your prescriptions during this visit.                    Javon Montague MD  03/13/21 7928

## 2021-03-18 ENCOUNTER — OFFICE VISIT (OUTPATIENT)
Dept: PAIN MEDICINE | Facility: CLINIC | Age: 86
End: 2021-03-18

## 2021-03-18 VITALS
DIASTOLIC BLOOD PRESSURE: 80 MMHG | TEMPERATURE: 97.3 F | SYSTOLIC BLOOD PRESSURE: 155 MMHG | HEART RATE: 88 BPM | RESPIRATION RATE: 18 BRPM | WEIGHT: 128 LBS | OXYGEN SATURATION: 97 % | BODY MASS INDEX: 24.17 KG/M2 | HEIGHT: 61 IN

## 2021-03-18 DIAGNOSIS — M47.816 LUMBAR FACET ARTHROPATHY: ICD-10-CM

## 2021-03-18 DIAGNOSIS — M48.061 SPINAL STENOSIS OF LUMBAR REGION WITHOUT NEUROGENIC CLAUDICATION: ICD-10-CM

## 2021-03-18 DIAGNOSIS — M53.3 SACROILIAC JOINT DYSFUNCTION OF RIGHT SIDE: ICD-10-CM

## 2021-03-18 DIAGNOSIS — G89.29 OTHER CHRONIC PAIN: Primary | ICD-10-CM

## 2021-03-18 PROCEDURE — 99213 OFFICE O/P EST LOW 20 MIN: CPT | Performed by: NURSE PRACTITIONER

## 2021-03-18 NOTE — PROGRESS NOTES
CHIEF COMPLAINT  Back pain is unchanged since last visit    Subjective   Nyasia Dalal is a 87 y.o. female  who presents for follow-up.  She has a history of back pain.  Daughter present and assists with history.      Previous benefit from both LESI and SI joint injection.  Last visit was doing well in terms of back pain.  Was in physical therapy and doing quite well. It has been about a month since last visit, wishes to resume therapy.      Overall was doing well until a fall last week. Legs have been hurting since that time.  Not flared to the point of needing an epidural.  She did seek medical attention, nothing acute was found.      Patient remained masked during entire encounter. No cough present. I donned a mask and eye protection throughout entire visit. Prior to donning mask and eye protection, hand hygiene was performed, as well as when it was doffed.  I was closer than 6 feet, but not for an extended period of time. No obvious exposure to any bodily fluids.    Back Pain  This is a chronic problem. The current episode started more than 1 year ago. The problem occurs daily. The problem has been waxing and waning since onset. The pain is present in the lumbar spine and sacro-iliac. The quality of the pain is described as aching and burning. The pain radiates to the left thigh and right thigh. The pain is at a severity of 5/10. The pain is moderate. The symptoms are aggravated by standing and position. Associated symptoms include numbness and weakness. Pertinent negatives include no chest pain, dysuria, fever or headaches. She has tried analgesics and home exercises for the symptoms. The treatment provided moderate (with PT) relief.      PEG Assessment   What number best describes your pain on average in the past week?5  What number best describes how, during the past week, pain has interfered with your enjoyment of life?4  What number best describes how, during the past week, pain has interfered with your  "general activity?  4    The following portions of the patient's history were reviewed and updated as appropriate: allergies, current medications, past family history, past medical history, past social history, past surgical history and problem list.    Review of Systems   Constitutional: Negative for chills and fever.   Respiratory: Negative for shortness of breath.    Cardiovascular: Negative for chest pain.   Gastrointestinal: Positive for constipation. Negative for diarrhea, nausea and vomiting.   Genitourinary: Negative for difficulty urinating, dyspareunia and dysuria.   Musculoskeletal: Positive for back pain.   Neurological: Positive for weakness and numbness. Negative for dizziness, light-headedness and headaches.   Psychiatric/Behavioral: Negative for confusion, hallucinations, self-injury, sleep disturbance and suicidal ideas. The patient is not nervous/anxious.    All other systems reviewed and are negative.    I have reviewed and confirmed the accuracy of the ROS as documented by the MA/LPN/RN EHSAN Lopez    Vitals:    03/18/21 1501   BP: 155/80   Pulse: 88   Resp: 18   Temp: 97.3 °F (36.3 °C)   SpO2: 97%   Weight: 58.1 kg (128 lb)   Height: 154.9 cm (61\")   PainSc:   5   PainLoc: Back         Objective   Physical Exam  Vitals and nursing note reviewed.   Constitutional:       General: She is not in acute distress.     Appearance: Normal appearance. She is not ill-appearing.   Pulmonary:      Effort: Pulmonary effort is normal. No respiratory distress.   Musculoskeletal:      Lumbar back: Tenderness (mild) present.   Skin:     General: Skin is warm and dry.   Neurological:      Mental Status: She is alert and oriented to person, place, and time.      Motor: Motor function is intact.      Gait: Gait abnormal (slowed, daughter assists).   Psychiatric:         Mood and Affect: Mood normal.         Behavior: Behavior normal.       Assessment/Plan   Diagnoses and all orders for this visit:    1. " Other chronic pain (Primary)    2. Sacroiliac joint dysfunction of right side  -     Ambulatory Referral to Physical Therapy Evaluate and treat    3. Lumbar facet arthropathy  -     Ambulatory Referral to Physical Therapy Evaluate and treat    4. Spinal stenosis of lumbar region without neurogenic claudication  -     Ambulatory Referral to Physical Therapy Evaluate and treat      --- Resume PT. Order placed.  --- Repeat SI joint injection versus LESI prn depending on presentation at time of flare  --- Daughter will reach out to PCP regarding facilitation of assistive device order (walker)  --- Follow-up 3 months/prn          DIGNA REPORT  DIGNA report has been reviewed and scanned into the patient's chart.    As the clinician, I personally reviewed the DIGNA from 3/18/2021 while the patient was in the office today.    EMR Dragon/Transcription disclaimer:   Much of this encounter note is an electronic transcription/translation of spoken language to printed text. The electronic translation of spoken language may permit erroneous, or at times, nonsensical words or phrases to be inadvertently transcribed; Although I have reviewed the note for such errors, some may still exist.      20 minutes total time spent on patient encounter

## 2021-04-14 ENCOUNTER — TREATMENT (OUTPATIENT)
Dept: PHYSICAL THERAPY | Facility: CLINIC | Age: 86
End: 2021-04-14

## 2021-04-14 DIAGNOSIS — R29.898 WEAKNESS OF BOTH HIPS: ICD-10-CM

## 2021-04-14 DIAGNOSIS — M48.061 SPINAL STENOSIS OF LUMBAR REGION WITHOUT NEUROGENIC CLAUDICATION: Primary | ICD-10-CM

## 2021-04-14 DIAGNOSIS — Z74.09 DECREASED FUNCTIONAL MOBILITY AND ENDURANCE: ICD-10-CM

## 2021-04-14 PROCEDURE — 97162 PT EVAL MOD COMPLEX 30 MIN: CPT | Performed by: PHYSICAL THERAPIST

## 2021-04-14 PROCEDURE — 97110 THERAPEUTIC EXERCISES: CPT | Performed by: PHYSICAL THERAPIST

## 2021-04-14 PROCEDURE — 97116 GAIT TRAINING THERAPY: CPT | Performed by: PHYSICAL THERAPIST

## 2021-04-14 NOTE — PROGRESS NOTES
Physical Therapy Initial Evaluation and Plan of Care      Patient: Nyasia Dalal   : 1933  Diagnosis/ICD-10 Code:  Spinal stenosis of lumbar region without neurogenic claudication [M48.061]  Referring practitioner: EHSAN Lopez    Subjective Evaluation    History of Present Illness  Mechanism of injury: Daughter present upon arrival and then I spoke to daughter in WR while pt on table.  She had a fall outside and in garage same day last month.Got checked out and no fractures.  Daughter reports she thinks she has just gotten weak. Pt lives alone with  son stopping by everyday. She reports she still does her own checkbook but is having more trouble communicating lately    Pain  Quality: dull ache and discomfort  Relieving factors: rest and support  Aggravating factors: ambulation, standing, lifting and movement  Progression: worsening    Social Support  Lives with: alone    Diagnostic Tests  MRI studies: abnormal    Treatments  Previous treatment: physical therapy  Patient Goals  Patient goals for therapy: improved balance, increased strength and independence with ADLs/IADLs             Objective          Active Range of Motion     Lumbar   Flexion: WFL  Extension: Active lumbar extension: to neutral.   Left rotation: WFL  Right rotation: WFL    Strength/Myotome Testing     Left Hip   Planes of Motion   Flexion: 4-  Abduction: 4-    Right Hip   Planes of Motion   Flexion: 4-  Abduction: 4    Left Knee   Flexion: 4  Extension: 5    Right Knee   Flexion: 4  Extension: 5    Functional Assessment     Comments  Modified Oswestry 38%    5X STS:34 sec    TU.75           Assessment & Plan     Assessment  Impairments: activity intolerance, impaired balance, impaired physical strength and lacks appropriate home exercise program  Assessment details: Pt is a good candidate for skilled PT intervention to restore functional AROM and strength to return to previous level of ADL's.  Prognosis: good  Prognosis  details: Pt did well with therex today. Had some difficulty with answering questions at times and needed reminders to put cane on floor with gait training.  Functional Limitations: lifting and standing  Goals  Plan Goals: SHORT TERM GOALS:  3 weeks                        1. Pt independent with HEP with minimal increase in pain  2. Pt to demonstrate L hip strength by 1/2 grade to improve stability with ambulation   3. Pt to report being able stand for greater than 5 min with pain at a level of 5/10 or less    LONG TERM GOALS:   6 weeks  1. Pt to demonstrate ability to perform full functional squat with good form and control of the hips and without increasing pain  2. Pt to demonstrate L hip strength to 4/5 or greater to improve safety with ambulation on uneven surfaces  3. Pt to demonstrate ability to navigate stairs reciprocally with pain 2/10 or less safely with one HR   4.  Pt to score 10% higher on Modified Oswestry indicating improved perceived functional ability     Plan  Therapy options: will be seen for skilled physical therapy services  Planned therapy interventions: abdominal trunk stabilization, balance/weight-bearing training, flexibility, functional ROM exercises, home exercise program, neuromuscular re-education, soft tissue mobilization, strengthening, stretching and therapeutic activities  Frequency: 2x week  Duration in weeks: 12  Treatment plan discussed with: patient        Manual Therapy:         mins  92033;  Therapeutic Exercise:    15     mins  72334;     Neuromuscular Ethan:    8    mins  66548;    Therapeutic Activity:          mins  53288;     Gait Trainin    mins  32545;     Ultrasound:          mins  98456;    Electrical Stimulation:         mins  71534 ( );  Dry Needling          mins self-pay    Timed Treatment:   28   mins   Total Treatment:     55   mins    PT SIGNATURE: Erica Dan PT   [place] License # [id number]  DATE TREATMENT INITIATED: 2021    Initial  Certification  Certification Period: 7/13/2021  I certify that the therapy services are furnished while this patient is under my care.  The services outlined above are required by this patient, and will be reviewed every 90 days.     PHYSICIAN: Marisa Edge APRN      DATE:     Please sign and return via fax to [phone].. Thank you, [facility].

## 2021-04-21 ENCOUNTER — TREATMENT (OUTPATIENT)
Dept: PHYSICAL THERAPY | Facility: CLINIC | Age: 86
End: 2021-04-21

## 2021-04-21 DIAGNOSIS — Z74.09 DECREASED FUNCTIONAL MOBILITY AND ENDURANCE: ICD-10-CM

## 2021-04-21 DIAGNOSIS — R29.898 WEAKNESS OF BOTH HIPS: ICD-10-CM

## 2021-04-21 DIAGNOSIS — M48.061 SPINAL STENOSIS OF LUMBAR REGION WITHOUT NEUROGENIC CLAUDICATION: Primary | ICD-10-CM

## 2021-04-21 PROCEDURE — 97530 THERAPEUTIC ACTIVITIES: CPT | Performed by: PHYSICAL THERAPIST

## 2021-04-21 PROCEDURE — 97116 GAIT TRAINING THERAPY: CPT | Performed by: PHYSICAL THERAPIST

## 2021-04-21 PROCEDURE — 97110 THERAPEUTIC EXERCISES: CPT | Performed by: PHYSICAL THERAPIST

## 2021-04-21 NOTE — PROGRESS NOTES
Physical Therapy Daily Treatment Note      Visit # 2      Subjective   No new changes.    Objective   See Exercise, Manual, and Modality Logs for complete treatment.       Assessment/Plan  Excellent tolerance to current exercise routine w/o c/o pain. Required frequent verbal cuing for exercise instruction and count. Progress per POC.           Manual Therapy:    0     mins  28101;  Therapeutic Exercise:    20     mins  25402;     Neuromuscular Ethan:    0    mins  25775;    Therapeutic Activity:     10     mins  02349;     Gait Training:      10     mins  13970;     Ultrasound:     0     mins  84901;    Work Hardening           0      mins 54565  Iontophoresis               0   mins 83401    Timed Treatment:   40   mins   Total Treatment:     46   mins    Rosario Trevizo, PT  Physical Therapist

## 2021-04-29 ENCOUNTER — TREATMENT (OUTPATIENT)
Dept: PHYSICAL THERAPY | Facility: CLINIC | Age: 86
End: 2021-04-29

## 2021-04-29 DIAGNOSIS — Z74.09 DECREASED FUNCTIONAL MOBILITY AND ENDURANCE: ICD-10-CM

## 2021-04-29 DIAGNOSIS — M47.816 LUMBAR FACET ARTHROPATHY: ICD-10-CM

## 2021-04-29 DIAGNOSIS — M48.061 SPINAL STENOSIS OF LUMBAR REGION WITHOUT NEUROGENIC CLAUDICATION: Primary | ICD-10-CM

## 2021-04-29 DIAGNOSIS — R29.898 WEAKNESS OF BOTH HIPS: ICD-10-CM

## 2021-04-29 PROCEDURE — 97530 THERAPEUTIC ACTIVITIES: CPT | Performed by: PHYSICAL THERAPIST

## 2021-04-29 PROCEDURE — 97110 THERAPEUTIC EXERCISES: CPT | Performed by: PHYSICAL THERAPIST

## 2021-04-29 NOTE — PROGRESS NOTES
Physical Therapy Daily Progress Note    Patient: Nyasia Dalal   : 1933  Diagnosis/ICD-10 Code:  Spinal stenosis of lumbar region without neurogenic claudication [M48.061]  Referring practitioner: EHSAN Lopez  Date of Initial Visit: Type: THERAPY  Noted: 2021  Today's Date: 2021  Patient seen for 3 sessions         Nyasia Dalal reports: no back or leg pain. Legs feel stiff.    Subjective     Objective   See Exercise, Manual, and Modality Logs for complete treatment.       Assessment/Plan  Subjectively, pt reports no increase of pain or discomfort with interventions performed today. Performed well with established exercise program focused on increasing core and LE strength and mobility. Continues to demonstrate increased exercise activity tolerance. Continues to benefit from verbal/tactile cues to ensure proper form and technique for exercise performance.     Progress per Plan of Care           Manual Therapy:         mins  90777;  Therapeutic Exercise:    23     mins  03873;     Neuromuscular Ethan:        mins  68668;    Therapeutic Activity:     10     mins  35375;     Gait Training:           mins  04533;     Ultrasound:          mins  60871;    Electrical Stimulation:         mins  73826 ( );  Dry Needling          mins self-pay    Timed Treatment:   43   mins   Total Treatment:     43   mins    Ella Veliz PTA  Physical Therapist Assistant [id number]

## 2021-05-03 ENCOUNTER — TREATMENT (OUTPATIENT)
Dept: PHYSICAL THERAPY | Facility: CLINIC | Age: 86
End: 2021-05-03

## 2021-05-03 DIAGNOSIS — Z74.09 DECREASED FUNCTIONAL MOBILITY AND ENDURANCE: ICD-10-CM

## 2021-05-03 DIAGNOSIS — M48.061 SPINAL STENOSIS OF LUMBAR REGION WITHOUT NEUROGENIC CLAUDICATION: Primary | ICD-10-CM

## 2021-05-03 DIAGNOSIS — R29.898 WEAKNESS OF BOTH HIPS: ICD-10-CM

## 2021-05-03 PROCEDURE — 97110 THERAPEUTIC EXERCISES: CPT | Performed by: PHYSICAL THERAPIST

## 2021-05-03 PROCEDURE — 97530 THERAPEUTIC ACTIVITIES: CPT | Performed by: PHYSICAL THERAPIST

## 2021-05-03 NOTE — PROGRESS NOTES
Physical Therapy Daily Progress Note        Subjective     Nyasia Lababhinavt reports: No new reports per pt or daughter. No falls reported    Objective   See Exercise, Manual, and Modality Logs for complete treatment.       Assessment/Plan  Pt did well and able to ambulate in clinic without cane, without LOB and supervision only    Progress per Plan of Care           Manual Therapy:         mins  26939;  Therapeutic Exercise: 25      mins  34890;     Neuromuscular Ethan:       mins  91519;    Therapeutic Activity:    10     mins  72128;     Gait Trainin     mins  13846;     Ultrasound:         mins  02842;    Electrical Stimulation:        mins  46714 ( );  Dry Needling         mins self-pay    Timed Treatment:   40   mins   Total Treatment:     40   mins    Erica Dan, PT  Physical Therapist  [place] License # [id number]

## 2021-05-05 ENCOUNTER — TREATMENT (OUTPATIENT)
Dept: PHYSICAL THERAPY | Facility: CLINIC | Age: 86
End: 2021-05-05

## 2021-05-05 DIAGNOSIS — Z74.09 DECREASED FUNCTIONAL MOBILITY AND ENDURANCE: ICD-10-CM

## 2021-05-05 DIAGNOSIS — R29.898 WEAKNESS OF BOTH HIPS: ICD-10-CM

## 2021-05-05 DIAGNOSIS — M48.061 SPINAL STENOSIS OF LUMBAR REGION WITHOUT NEUROGENIC CLAUDICATION: Primary | ICD-10-CM

## 2021-05-05 DIAGNOSIS — M47.816 LUMBAR FACET ARTHROPATHY: ICD-10-CM

## 2021-05-05 PROCEDURE — 97112 NEUROMUSCULAR REEDUCATION: CPT | Performed by: PHYSICAL THERAPIST

## 2021-05-05 PROCEDURE — 97110 THERAPEUTIC EXERCISES: CPT | Performed by: PHYSICAL THERAPIST

## 2021-05-05 PROCEDURE — 97530 THERAPEUTIC ACTIVITIES: CPT | Performed by: PHYSICAL THERAPIST

## 2021-05-05 NOTE — PROGRESS NOTES
Physical Therapy Daily Progress Note    Patient: Nyasia Dalal   : 1933  Diagnosis/ICD-10 Code:  Spinal stenosis of lumbar region without neurogenic claudication [M48.061]  Referring practitioner: EHSAN Lopez  Date of Initial Visit: Type: THERAPY  Noted: 2021  Today's Date: 2021  Patient seen for 5 sessions         Nyasia Dalal reports: feeling okay. Legs were sore after last session.    Subjective     Objective   See Exercise, Manual, and Modality Logs for complete treatment.       Assessment/Plan  Subjectively, pt reports no increase of pain or discomfort with interventions performed today. Performed well with all strengthening and balance activities today. Continues to demonstrate improved subjective complaints and only supervision for ambulating around clinic.  Continues to benefit from verbal/tactile cues to ensure proper form and technique for exercise performance.     Progress per Plan of Care           Manual Therapy:         mins  07966;  Therapeutic Exercise:    25     mins  90015;     Neuromuscular Ethan:    10    mins  83908;    Therapeutic Activity:     10     mins  82226;     Gait Training:           mins  46950;     Ultrasound:          mins  17587;    Electrical Stimulation:         mins  41399 ( );  Dry Needling          mins self-pay    Timed Treatment:   45   mins   Total Treatment:     45   mins    Ella Veliz PTA  Physical Therapist Assistant [id number]

## 2021-05-10 ENCOUNTER — TREATMENT (OUTPATIENT)
Dept: PHYSICAL THERAPY | Facility: CLINIC | Age: 86
End: 2021-05-10

## 2021-05-10 DIAGNOSIS — Z74.09 DECREASED FUNCTIONAL MOBILITY AND ENDURANCE: ICD-10-CM

## 2021-05-10 DIAGNOSIS — R29.898 WEAKNESS OF BOTH HIPS: ICD-10-CM

## 2021-05-10 DIAGNOSIS — M48.061 SPINAL STENOSIS OF LUMBAR REGION WITHOUT NEUROGENIC CLAUDICATION: Primary | ICD-10-CM

## 2021-05-10 DIAGNOSIS — M47.816 LUMBAR FACET ARTHROPATHY: ICD-10-CM

## 2021-05-10 DIAGNOSIS — M53.3 SI (SACROILIAC) JOINT DYSFUNCTION: ICD-10-CM

## 2021-05-10 PROCEDURE — 97112 NEUROMUSCULAR REEDUCATION: CPT | Performed by: PHYSICAL THERAPIST

## 2021-05-10 PROCEDURE — 97530 THERAPEUTIC ACTIVITIES: CPT | Performed by: PHYSICAL THERAPIST

## 2021-05-10 PROCEDURE — 97110 THERAPEUTIC EXERCISES: CPT | Performed by: PHYSICAL THERAPIST

## 2021-05-10 NOTE — PROGRESS NOTES
Physical Therapy Daily Progress Note    Patient: Nyasia Dalal   : 1933  Diagnosis/ICD-10 Code:  Spinal stenosis of lumbar region without neurogenic claudication [M48.061]  Referring practitioner: EHSAN Lopez  Date of Initial Visit: Type: THERAPY  Noted: 2021  Today's Date: 5/10/2021  Patient seen for 6 sessions         Nyasia Dalal reports: legs are stiff.     Subjective     Objective   See Exercise, Manual, and Modality Logs for complete treatment.       Assessment/Plan  Subjectively, pt reports no increase of pain or discomfort with interventions performed today. Performed well with increased balance and standing activities with less time on table/supine exercises for increased exercise activity tolerance.  Continues to benefit from verbal/tactile cues to ensure proper form and technique for exercise performance.     Progress per Plan of Care           Manual Therapy:         mins  96405;  Therapeutic Exercise:    20     mins  96220;     Neuromuscular Ethan:    10    mins  14087;    Therapeutic Activity:     10     mins  47313;     Gait Training:           mins  67227;     Ultrasound:          mins  28236;    Electrical Stimulation:         mins  63094 ( );  Dry Needling          mins self-pay    Timed Treatment:  40    mins   Total Treatment:     40   mins    Ella Veliz PTA  Physical Therapist Assistant [id number]

## 2021-05-12 ENCOUNTER — TREATMENT (OUTPATIENT)
Dept: PHYSICAL THERAPY | Facility: CLINIC | Age: 86
End: 2021-05-12

## 2021-05-12 DIAGNOSIS — Z74.09 DECREASED FUNCTIONAL MOBILITY AND ENDURANCE: ICD-10-CM

## 2021-05-12 DIAGNOSIS — M48.061 SPINAL STENOSIS OF LUMBAR REGION WITHOUT NEUROGENIC CLAUDICATION: Primary | ICD-10-CM

## 2021-05-12 DIAGNOSIS — R29.898 WEAKNESS OF BOTH HIPS: ICD-10-CM

## 2021-05-12 PROCEDURE — 97110 THERAPEUTIC EXERCISES: CPT | Performed by: PHYSICAL THERAPIST

## 2021-05-12 PROCEDURE — 97112 NEUROMUSCULAR REEDUCATION: CPT | Performed by: PHYSICAL THERAPIST

## 2021-05-12 PROCEDURE — 97530 THERAPEUTIC ACTIVITIES: CPT | Performed by: PHYSICAL THERAPIST

## 2021-05-12 NOTE — PROGRESS NOTES
Physical Therapy Daily Progress Note        Subjective     Nyasia Dalal reports: No new complaints.     Objective   See Exercise, Manual, and Modality Logs for complete treatment.       Assessment/Plan  Did well with added leg press and rockerboard. Attempted SLS in bars but pt afraid to let go of both hands    Progress per Plan of Care and Progress strengthening /stabilization /functional activity           Manual Therapy:         mins  07576;  Therapeutic Exercise: 20      mins  21711;     Neuromuscular Ethan:10       mins  74745;    Therapeutic Activity:    10     mins  58217;     Gait Trainin     mins  46299;     Ultrasound:         mins  06085;    Electrical Stimulation:        mins  29683 ( );  Dry Needling         mins self-pay    Timed Treatment: 45     mins   Total Treatment:     45   mins    Erica Dan, PT  Physical Therapist  [place] License # [id number]

## 2021-05-17 ENCOUNTER — TREATMENT (OUTPATIENT)
Dept: PHYSICAL THERAPY | Facility: CLINIC | Age: 86
End: 2021-05-17

## 2021-05-17 DIAGNOSIS — M48.061 SPINAL STENOSIS OF LUMBAR REGION WITHOUT NEUROGENIC CLAUDICATION: Primary | ICD-10-CM

## 2021-05-17 DIAGNOSIS — M47.816 LUMBAR FACET ARTHROPATHY: ICD-10-CM

## 2021-05-17 DIAGNOSIS — Z74.09 DECREASED FUNCTIONAL MOBILITY AND ENDURANCE: ICD-10-CM

## 2021-05-17 DIAGNOSIS — R29.898 WEAKNESS OF BOTH HIPS: ICD-10-CM

## 2021-05-17 PROCEDURE — 97110 THERAPEUTIC EXERCISES: CPT | Performed by: PHYSICAL THERAPIST

## 2021-05-17 PROCEDURE — 97112 NEUROMUSCULAR REEDUCATION: CPT | Performed by: PHYSICAL THERAPIST

## 2021-05-17 PROCEDURE — 97530 THERAPEUTIC ACTIVITIES: CPT | Performed by: PHYSICAL THERAPIST

## 2021-05-17 NOTE — PROGRESS NOTES
Physical Therapy Daily Progress Note    Patient: Nyasia Dalal   : 1933  Diagnosis/ICD-10 Code:  Spinal stenosis of lumbar region without neurogenic claudication [M48.061]  Referring practitioner: EHSAN Lopez  Date of Initial Visit: Type: THERAPY  Noted: 2021  Today's Date: 2021  Patient seen for 8 sessions         Nyasia Dalal reports: no new complaints.    Subjective     Objective   See Exercise, Manual, and Modality Logs for complete treatment.       Assessment/Plan  Subjectively, pt reports no increase of pain or discomfort with interventions performed today. Performed well with single leg stance activity today with Jose to ease pt's fear. Continues to demonstrate improved functional activity tolerance.  Continues to benefit from verbal/tactile cues to ensure proper form and technique for exercise performance.     Progress per Plan of Care           Manual Therapy:         mins  87186;  Therapeutic Exercise:    20     mins  82725;     Neuromuscular Ethan:    10    mins  62926;    Therapeutic Activity:     10     mins  26210;     Gait Training:           mins  00866;     Ultrasound:          mins  44993;    Electrical Stimulation:         mins  76733 ( );  Dry Needling          mins self-pay    Timed Treatment:   40   mins   Total Treatment:     40   mins    Ella Veliz PTA  Physical Therapist Assistant [id number]

## 2021-05-19 ENCOUNTER — TREATMENT (OUTPATIENT)
Dept: PHYSICAL THERAPY | Facility: CLINIC | Age: 86
End: 2021-05-19

## 2021-05-19 DIAGNOSIS — Z74.09 DECREASED FUNCTIONAL MOBILITY AND ENDURANCE: ICD-10-CM

## 2021-05-19 DIAGNOSIS — R29.898 WEAKNESS OF BOTH HIPS: ICD-10-CM

## 2021-05-19 DIAGNOSIS — M48.061 SPINAL STENOSIS OF LUMBAR REGION WITHOUT NEUROGENIC CLAUDICATION: Primary | ICD-10-CM

## 2021-05-19 PROCEDURE — 97112 NEUROMUSCULAR REEDUCATION: CPT | Performed by: PHYSICAL THERAPIST

## 2021-05-19 PROCEDURE — 97110 THERAPEUTIC EXERCISES: CPT | Performed by: PHYSICAL THERAPIST

## 2021-05-19 PROCEDURE — 97530 THERAPEUTIC ACTIVITIES: CPT | Performed by: PHYSICAL THERAPIST

## 2021-05-19 NOTE — PROGRESS NOTES
Re-Assessment / Re-Certification      Patient: Nyasia Dalal   : 1933  Diagnosis/ICD-10 Code:  Spinal stenosis of lumbar region without neurogenic claudication [M48.061]  Referring practitioner: EHSAN Lopez  Date of Initial Visit: 2021  Today's Date: 2021  Patient seen for 8 sessions      Subjective:   Nyasia Dalal reports: No complaints except some back pain with prolonged standing > 10 mins  Subjective Questionnaire: Oswestry: NT  Clinical Progress: improved  Home Program Compliance: Yes  Treatment has included: therapeutic exercise, neuromuscular re-education, manual therapy, therapeutic activity and gait training    Objective   Strength/Myotome Testing      Left Hip   Planes of Motion   Flexion: 4+  Abduction: 4     Right Hip   Planes of Motion   Flexion: 4+  Abduction: 4     Left Knee   Flexion: 5  Extension: 5     Right Knee   Flexion: 5  Extension: 5    Functional Assessment      Comments    5X STS:25 sec     TU     Assessment/Plan   SHORT TERM GOALS:  3 weeks  MET                      1. Pt independent with HEP with minimal increase in pain  2. Pt to demonstrate L hip strength by 1/2 grade to improve stability with ambulation  MET  3. Pt to report being able stand for greater than 5 min with pain at a level of 5/10 or less MET    LONG TERM GOALS:   6 weeks  1. Pt to demonstrate ability to perform full functional squat with good form and control of the hips and without increasing pain  2. Pt to demonstrate L hip strength to 4/5 or greater to improve safety with ambulation on uneven surfaces  3. Pt to demonstrate ability to navigate stairs reciprocally with pain 2/10 or less safely with one HR   4.  Pt to score 10% higher on Modified Oswestry indicating improved perceived functional ability     Progress toward previous goals: Partially Met        Recommendations: Continue as planned  Timeframe: 2 weeks  Prognosis to achieve goals: good    PT Signature: Erica Dan,  PT  [place] License # [id number]    Based upon review of the patient's progress and continued therapy plan, it is my medical opinion that Nyasia Dalal should continue physical therapy treatment at [facility]  [address] [address] [phone].    Signature: __________________________________  Marisa Edge APRN    Manual Therapy:         mins  57792;  Therapeutic Exercise:    15     mins  84013;     Neuromuscular Ethan:    10    mins  29750;    Therapeutic Activity:     10     mins  45401;     Gait Trainin   mins  21823;     Ultrasound:          mins  50637;    Electrical Stimulation:         mins  43735 ( );  Dry Needling          mins self-pay    Timed Treatment:   40   mins   Total Treatment:     40   mins

## 2021-05-26 ENCOUNTER — TREATMENT (OUTPATIENT)
Dept: PHYSICAL THERAPY | Facility: CLINIC | Age: 86
End: 2021-05-26

## 2021-05-26 DIAGNOSIS — M48.061 SPINAL STENOSIS OF LUMBAR REGION WITHOUT NEUROGENIC CLAUDICATION: Primary | ICD-10-CM

## 2021-05-26 DIAGNOSIS — Z74.09 DECREASED FUNCTIONAL MOBILITY AND ENDURANCE: ICD-10-CM

## 2021-05-26 DIAGNOSIS — R29.898 WEAKNESS OF BOTH HIPS: ICD-10-CM

## 2021-05-26 PROCEDURE — 97530 THERAPEUTIC ACTIVITIES: CPT | Performed by: PHYSICAL THERAPIST

## 2021-05-26 PROCEDURE — 97112 NEUROMUSCULAR REEDUCATION: CPT | Performed by: PHYSICAL THERAPIST

## 2021-05-26 PROCEDURE — 97110 THERAPEUTIC EXERCISES: CPT | Performed by: PHYSICAL THERAPIST

## 2021-05-26 NOTE — PROGRESS NOTES
Physical Therapy Daily Progress Note        Subjective     Nyasia Mulugeta reports: No complaints.    Objective   See Exercise, Manual, and Modality Logs for complete treatment.       Assessment/Plan  Spoke with daughter. She is concerned her mother diaz  decline in strength and balance idf she is not doing PT . Not capable of doing HEP (I) due to cognitive issues. Instructed to schedule 1/week for another month    Progress per Plan of Care           Manual Therapy:         mins  28672;  Therapeutic Exercise: 20      mins  90510;     Neuromuscular Ethan:8       mins  00611;    Therapeutic Activity:    10     mins  36895;     Gait Trainin   mins  41280;     Ultrasound:         mins  88682;    Electrical Stimulation:        mins  32933 ( );  Dry Needling         mins self-pay    Timed Treatment:   43   mins   Total Treatment:     43   mins    Erica Dan, PT  Physical Therapist  [place] License # [id number]

## 2021-06-04 ENCOUNTER — TREATMENT (OUTPATIENT)
Dept: PHYSICAL THERAPY | Facility: CLINIC | Age: 86
End: 2021-06-04

## 2021-06-04 PROCEDURE — 97530 THERAPEUTIC ACTIVITIES: CPT | Performed by: PHYSICAL THERAPIST

## 2021-06-04 PROCEDURE — 97112 NEUROMUSCULAR REEDUCATION: CPT | Performed by: PHYSICAL THERAPIST

## 2021-06-04 PROCEDURE — 97110 THERAPEUTIC EXERCISES: CPT | Performed by: PHYSICAL THERAPIST

## 2021-06-04 NOTE — PROGRESS NOTES
Physical Therapy Daily Progress Note        Subjective     Nyasia Labhart reports: No complaints    Objective   See Exercise, Manual, and Modality Logs for complete treatment.       Assessment/Plan  Improved strength on leg press today. No complaints of pain just LE fatigue    Progress per Plan of Care and Progress strengthening /stabilization /functional activity           Manual Therapy:         mins  81853;  Therapeutic Exercise: 20      mins  15079;     Neuromuscular Ethan:8       mins  97099;    Therapeutic Activity:    10     mins  05194;     Gait Trainin     mins  92595;     Ultrasound:         mins  86025;    Electrical Stimulation:        mins  93732 ( );  Dry Needling         mins self-pay    Timed Treatment:   43   mins   Total Treatment:     43   mins    Erica Dan, PT  Physical Therapist  [place] License # [id number]

## 2021-06-22 ENCOUNTER — OFFICE VISIT (OUTPATIENT)
Dept: PAIN MEDICINE | Facility: CLINIC | Age: 86
End: 2021-06-22

## 2021-06-22 VITALS
DIASTOLIC BLOOD PRESSURE: 79 MMHG | HEART RATE: 71 BPM | RESPIRATION RATE: 18 BRPM | SYSTOLIC BLOOD PRESSURE: 157 MMHG | HEIGHT: 61 IN | TEMPERATURE: 96.6 F | BODY MASS INDEX: 24.92 KG/M2 | WEIGHT: 132 LBS | OXYGEN SATURATION: 94 %

## 2021-06-22 DIAGNOSIS — M48.061 SPINAL STENOSIS OF LUMBAR REGION WITHOUT NEUROGENIC CLAUDICATION: ICD-10-CM

## 2021-06-22 DIAGNOSIS — M47.816 LUMBAR FACET ARTHROPATHY: ICD-10-CM

## 2021-06-22 DIAGNOSIS — G89.29 OTHER CHRONIC PAIN: Primary | ICD-10-CM

## 2021-06-22 PROCEDURE — 99212 OFFICE O/P EST SF 10 MIN: CPT | Performed by: NURSE PRACTITIONER

## 2021-06-22 NOTE — PROGRESS NOTES
CHIEF COMPLAINT  Follow-up for back pain.    Subjective   Nyasia Dalal is a 87 y.o. female  who presents for follow-up.  She has a history of chronic back pain. Reports this is improved since last evaluation.    Patient was last evaluated by EHSAN Lopez on 3/18/2021.  Patient has a history of chronic low back pain.  Has previously benefited both from LESI and SI joint injections.  At last office visit was doing quite well until a fall occured.  Had stopped going to physical therapy and wished to restart this.  Order to return to physical therapy.  Repeat SI joint injection versus LESI in future as needed.  Daughter will reach out to PCP regarding facilitation of assistive device order/walker.  Follow-up in 3 months or sooner if needed.    Procedure List  -- 11-9-20-- RIGHT SI  -- 10-19-20-- CAUDAL DENNIS    Patient remained masked during entire encounter. No cough present. I donned a mask and eye protection throughout entire visit. Prior to donning mask and eye protection, hand hygiene was performed, as well as when it was doffed.  I was closer than 6 feet, but not for an extended period of time. No obvious exposure to any bodily fluids.    Back Pain  This is a chronic problem. The current episode started more than 1 year ago. The problem occurs daily. The problem has been waxing and waning since onset. The pain is present in the lumbar spine and sacro-iliac. The quality of the pain is described as aching and burning. The pain radiates to the left thigh and right thigh. The pain is at a severity of 0/10. The pain is moderate. The symptoms are aggravated by standing and position. Associated symptoms include weakness (bilateral legs). Pertinent negatives include no chest pain, dysuria, fever, headaches or numbness. She has tried analgesics and home exercises for the symptoms. The treatment provided moderate (with PT) relief.      The patient has a pain history of the following:  Spinal stenosis  Low back  "pain  L3 vertebral compression fracture- June 2020     Pain medications include:  Aleve      Previously: Tramadol (not on DIGNA), Meloxicam     Other conservative modalities which the patient reports using include:  Physical Therapy: yes a few years ago   Ice pack - does not help      Past Significant Surgical History:  None       PEG Assessment   What number best describes your pain on average in the past week?5  What number best describes how, during the past week, pain has interfered with your enjoyment of life?3  What number best describes how, during the past week, pain has interfered with your general activity?  3      The following portions of the patient's history were reviewed and updated as appropriate: allergies, current medications, past family history, past medical history, past social history, past surgical history and problem list.    Review of Systems   Constitutional: Negative for fatigue and fever.   HENT: Negative for congestion.    Eyes: Negative for visual disturbance.   Respiratory: Negative for shortness of breath.    Cardiovascular: Negative for chest pain and leg swelling.   Gastrointestinal: Negative for constipation and diarrhea.   Genitourinary: Negative for difficulty urinating and dysuria.   Musculoskeletal: Positive for back pain and joint swelling (bilateral ankles).   Neurological: Positive for weakness (bilateral legs). Negative for numbness and headaches.   Psychiatric/Behavioral: Negative for sleep disturbance and suicidal ideas. The patient is not nervous/anxious.      I have reviewed and confirmed the accuracy of the ROS as documented by the MA/LPN/RN EHSAN Lopez      Vitals:    06/22/21 1514   BP: 157/79   Pulse: 71   Resp: 18   Temp: 96.6 °F (35.9 °C)   SpO2: 94%   Weight: 59.9 kg (132 lb)   Height: 154.9 cm (61\")   PainSc: 0-No pain     Objective   Physical Exam  Vitals and nursing note reviewed.   Constitutional:       General: She is not in acute distress.     " Appearance: Normal appearance. She is not ill-appearing.   Pulmonary:      Effort: Pulmonary effort is normal. No respiratory distress.   Musculoskeletal:      Lumbar back: Tenderness (mild) present.   Skin:     General: Skin is warm and dry.   Neurological:      Mental Status: She is alert and oriented to person, place, and time.      Motor: Motor function is intact.      Gait: Gait abnormal (slowed, daughter assists).   Psychiatric:         Mood and Affect: Mood normal.         Behavior: Behavior normal.       Assessment/Plan   Diagnoses and all orders for this visit:    1. Other chronic pain (Primary)    2. Spinal stenosis of lumbar region without neurogenic claudication  -     Ambulatory Referral to Physical Therapy Evaluate and treat    3. Lumbar facet arthropathy  -     Ambulatory Referral to Physical Therapy Evaluate and treat      --- Continue PT. Updated order placed. Patient making progress in regards to pain as well as strength/stability.    --- Repeat injections PRN in the event of a painful flare up   --- Follow-up PRN       DIGNA REPORT  As the clinician, I personally reviewed the DIGNA from 6-22-21 while the patient was in the office today.

## 2021-08-02 ENCOUNTER — TREATMENT (OUTPATIENT)
Dept: PHYSICAL THERAPY | Facility: CLINIC | Age: 86
End: 2021-08-02

## 2021-08-02 DIAGNOSIS — M53.3 SACROILIAC JOINT DYSFUNCTION OF RIGHT SIDE: ICD-10-CM

## 2021-08-02 DIAGNOSIS — M47.816 LUMBAR FACET ARTHROPATHY: ICD-10-CM

## 2021-08-02 DIAGNOSIS — M48.061 SPINAL STENOSIS OF LUMBAR REGION WITHOUT NEUROGENIC CLAUDICATION: Primary | ICD-10-CM

## 2021-08-02 PROCEDURE — 97110 THERAPEUTIC EXERCISES: CPT | Performed by: PHYSICAL THERAPIST

## 2021-08-02 PROCEDURE — 97161 PT EVAL LOW COMPLEX 20 MIN: CPT | Performed by: PHYSICAL THERAPIST

## 2021-08-02 NOTE — PROGRESS NOTES
Physical Therapy Initial Evaluation and Plan of Care    Patient: Nyasia Dalal   : 1933  Diagnosis/ICD-10 Code:  Spinal stenosis of lumbar region without neurogenic claudication [M48.061]  Referring practitioner: EHSAN Lopez  Date of Initial Visit: 2021  Today's Date: 2021    Subjective Questionnaire: Oswestry: 28% limitation    Subjective Evaluation    History of Present Illness  Mechanism of injury: Pt presents with LE weakness and balance impairment. Denies recent falls. No leg pain at this time, pt states she has intermittent pain but her primary concern is strength and balance.     Quality of life: good    Pain  Location: Low back  Quality: dull ache and discomfort  Relieving factors: rest  Aggravating factors: ambulation and prolonged positioning    Social Support  Lives with: alone (adult children help)    Diagnostic Tests  MRI studies: abnormal    Treatments  Previous treatment: physical therapy  Patient Goals  Patient goals for therapy: increased strength             Objective          Active Range of Motion     Lumbar   Flexion: WFL  Extension: Active lumbar extension: to neutral.   Left lateral flexion: WFL  Right lateral flexion: WFL  Left rotation: WFL  Right rotation: WFL    Strength/Myotome Testing     Left Hip   Planes of Motion   Flexion: 4+  Extension: 4-  Abduction: 4-  Adduction: 4  External rotation: 4-  Internal rotation: 4-    Right Hip   Planes of Motion   Flexion: 4+  Extension: 4-  Abduction: 4-  Adduction: 4  External rotation: 4-  Internal rotation: 4-    Left Knee   Flexion: 4  Extension: 4+    Right Knee   Flexion: 4  Extension: 4+    Left Ankle/Foot   Dorsiflexion: 4  Plantar flexion: 4-    Right Ankle/Foot   Dorsiflexion: 4  Plantar flexion: 4-    Tests     Lumbar     Left   Negative passive SLR.     Right   Negative passive SLR.     Ambulation     Observational Gait   Decreased walking speed and stride length.   Left foot contact pattern: foot flat  Right  foot contact pattern: foot flat  Left arm swing: decreased  Right arm swing: decreased    Functional Assessment     Comments  TUG = 21 seconds, no assistive device  5x STS = 38 seconds          Assessment & Plan     Assessment  Impairments: activity intolerance, impaired physical strength, lacks appropriate home exercise program and pain with function  Assessment details: Ms. Dalal is a pleasant 87 year old female who presents with intermittent low back/LE pain, decreased core/LE strength, decreased activity tolerance and balance deficit. She will benefit from skilled PT services in order to address impairments and facilitate return to normal daily activities including ADL's and recreational activities.    Prognosis: good  Functional Limitations: lifting, sleeping, walking, uncomfortable because of pain, moving in bed and sitting  Goals  Plan Goals: SHORT TERM GOALS:  3 weeks                        1. Pt independent with HEP with minimal increase in pain  2. Pt to demonstrate B hip strength by 1/2 grade to improve stability with ambulation   3. Pt to report being able stand for greater than 5 min with pain at a level of 5/10 or less    LONG TERM GOALS:   6 weeks  1. Pt to demonstrate ability to perform full functional squat with good form and control of the hips and without increasing pain  2. Pt to demonstrate B hip strength to 4/5 or greater to improve safety with ambulation on uneven surfaces  3. Pt to demonstrate ability to navigate stairs reciprocally with pain 2/10 or less safely with one HR   4.  Pt with 10% improvement in Modified Oswestry score indicating improved perceived functional ability   5. Demo TUG in 20 seconds or less for improved functional mobility and decreased fall risk.     Plan  Therapy options: will be seen for skilled physical therapy services  Planned modality interventions: thermotherapy (hydrocollator packs) and cryotherapy  Planned therapy interventions: abdominal trunk  stabilization, neuromuscular re-education, body mechanics training, postural training, soft tissue mobilization, strengthening, stretching, home exercise program, functional ROM exercises, flexibility, balance/weight-bearing training, therapeutic activities and gait training  Frequency: 2x week  Duration in weeks: 12  Treatment plan discussed with: patient        Manual Therapy:    0     mins  59103;  Therapeutic Exercise:    20     mins  63140;     Neuromuscular Ethan:    0    mins  73415;    Therapeutic Activity:     0     mins  75226;     Gait Trainin     mins  14932;     Ultrasound:     0     mins  52715;    Electrical Stimulation:    0     mins  98943 ( );    Timed Treatment:   20   mins   Total Treatment:     45   mins    PT SIGNATURE: Karol Edge PT, DPT          Physical Therapist                               [place] License #[id number]    DATE TREATMENT INITIATED: 2021    Initial Certification  Certification Period: 10/31/2021  I certify that the therapy services are furnished while this patient is under my care.  The services outlined above are required by this patient, and will be reviewed every 90 days.     PHYSICIAN: Marisa Edge APRN      DATE:     Please sign and return via fax to [phone].. Thank you, [facility].

## 2021-08-04 NOTE — PATIENT INSTRUCTIONS
Access Code: [email or web address]  URL: [email or web address]  Date: 08/04/2021  Prepared by: Karol Edge    Exercises  Hooklying Gluteal Sets - 1 x daily - 10 reps - 5 hold  Seated Long Arc Quad - 1 x daily - 1 sets - 10 reps - 5 hold  Supine Lower Trunk Rotation - 1 x daily - 10 reps - 1 sets - 5 hold  Supine Hip Adduction Isometric with Ball - 1 x daily - 1 sets - 10 reps - 5 hold  Hooklying Clamshell with Resistance - 1 x daily - 1 sets - 10 reps - 5 weekly

## 2021-08-06 ENCOUNTER — TREATMENT (OUTPATIENT)
Dept: PHYSICAL THERAPY | Facility: CLINIC | Age: 86
End: 2021-08-06

## 2021-08-06 DIAGNOSIS — M48.061 SPINAL STENOSIS OF LUMBAR REGION WITHOUT NEUROGENIC CLAUDICATION: Primary | ICD-10-CM

## 2021-08-06 PROCEDURE — 97112 NEUROMUSCULAR REEDUCATION: CPT | Performed by: PHYSICAL THERAPIST

## 2021-08-06 PROCEDURE — 97530 THERAPEUTIC ACTIVITIES: CPT | Performed by: PHYSICAL THERAPIST

## 2021-08-06 PROCEDURE — 97110 THERAPEUTIC EXERCISES: CPT | Performed by: PHYSICAL THERAPIST

## 2021-08-06 NOTE — PROGRESS NOTES
Physical Therapy Daily Progress Note        Subjective     Nyasia Dalal reports: Doing OK    Objective   See Exercise, Manual, and Modality Logs for complete treatment.       Assessment/Plan  No pain just fatigue with exercises.    Progress per Plan of Care           Manual Therapy:         mins  78031;  Therapeutic Exercise: 20      mins  36383;     Neuromuscular Ethan:10       mins  25993;    Therapeutic Activity:    10     mins  19661;     Gait Training:         mins  28750;     Ultrasound:         mins  15578;    Electrical Stimulation:        mins  43568 ( );  Dry Needling         mins self-pay    Timed Treatment:   40   mins   Total Treatment:     40   mins    Erica Dan, PT  Physical Therapist  [place] License # [id number]

## 2021-08-12 ENCOUNTER — TREATMENT (OUTPATIENT)
Dept: PHYSICAL THERAPY | Facility: CLINIC | Age: 86
End: 2021-08-12

## 2021-08-12 DIAGNOSIS — Z74.09 DECREASED FUNCTIONAL MOBILITY AND ENDURANCE: ICD-10-CM

## 2021-08-12 DIAGNOSIS — M47.816 LUMBAR FACET ARTHROPATHY: ICD-10-CM

## 2021-08-12 DIAGNOSIS — M48.061 SPINAL STENOSIS OF LUMBAR REGION WITHOUT NEUROGENIC CLAUDICATION: Primary | ICD-10-CM

## 2021-08-12 PROCEDURE — 97112 NEUROMUSCULAR REEDUCATION: CPT | Performed by: PHYSICAL THERAPIST

## 2021-08-12 PROCEDURE — 97110 THERAPEUTIC EXERCISES: CPT | Performed by: PHYSICAL THERAPIST

## 2021-08-12 PROCEDURE — 97530 THERAPEUTIC ACTIVITIES: CPT | Performed by: PHYSICAL THERAPIST

## 2021-08-12 NOTE — PROGRESS NOTES
Physical Therapy Daily Progress Note    Patient: Nyasia Dalal   : 1933  Diagnosis/ICD-10 Code:  Spinal stenosis of lumbar region without neurogenic claudication [M48.061]  Referring practitioner: EHSAN Lopez  Date of Initial Visit: Type: THERAPY  Noted: 2021  Today's Date: 2021  Patient seen for 3 sessions         Nyasia Dalal reports: doing okay. Legs feel weak.    Subjective     Objective   See Exercise, Manual, and Modality Logs for complete treatment.       Assessment/Plan  Subjectively, pt reports no increase of pain or discomfort with interventions performed today. Performed well with increased functional activity tolerance. Continues to demonstrate early to fatigue associated musculature.  Continues to benefit from verbal/tactile cues to ensure proper form and technique for exercise performance.     Progress per Plan of Care           Manual Therapy:         mins  79931;  Therapeutic Exercise:    20     mins  67721;     Neuromuscular Ethan:    10    mins  14598;    Therapeutic Activity:     10     mins  12926;     Gait Training:           mins  37514;     Ultrasound:          mins  66595;    Electrical Stimulation:         mins  36681 ( );  Dry Needling          mins self-pay    Timed Treatment:   40   mins   Total Treatment:     40   mins    Ella Veliz PTA  Physical Therapist Assistant [id number]

## 2021-08-18 ENCOUNTER — TREATMENT (OUTPATIENT)
Dept: PHYSICAL THERAPY | Facility: CLINIC | Age: 86
End: 2021-08-18

## 2021-08-18 DIAGNOSIS — M47.816 LUMBAR FACET ARTHROPATHY: ICD-10-CM

## 2021-08-18 DIAGNOSIS — M48.061 SPINAL STENOSIS OF LUMBAR REGION WITHOUT NEUROGENIC CLAUDICATION: Primary | ICD-10-CM

## 2021-08-18 DIAGNOSIS — Z74.09 DECREASED FUNCTIONAL MOBILITY AND ENDURANCE: ICD-10-CM

## 2021-08-18 PROCEDURE — 97112 NEUROMUSCULAR REEDUCATION: CPT | Performed by: PHYSICAL THERAPIST

## 2021-08-18 PROCEDURE — 97110 THERAPEUTIC EXERCISES: CPT | Performed by: PHYSICAL THERAPIST

## 2021-08-18 PROCEDURE — 97530 THERAPEUTIC ACTIVITIES: CPT | Performed by: PHYSICAL THERAPIST

## 2021-08-18 NOTE — PROGRESS NOTES
Physical Therapy Daily Progress Note    Visit #4    Subjective     Nyasia Kimkushal reports: feeling OK today.      Objective   See Exercise, Manual, and Modality Logs for complete treatment.       Assessment/Plan  Able to increase repetitions on some exercises, but was unable to perform toe taps without UE assist today.   Progress per Plan of Care           Manual Therapy:    0     mins  51896;  Therapeutic Exercise:    20     mins  13441;     Neuromuscular Ethan:   10    mins  85020;    Therapeutic Activity:     12     mins  08450;     Gait Trainin     mins  90416;     Ultrasound:     0     mins  80528;    Electrical Stimulation:    0     mins  76771 ( );    Timed Treatment:   42   mins   Total Treatment:     42   mins    Karol Edge PT, DPT  Physical Therapist  [place] License #[id number]

## 2021-08-20 ENCOUNTER — TREATMENT (OUTPATIENT)
Dept: PHYSICAL THERAPY | Facility: CLINIC | Age: 86
End: 2021-08-20

## 2021-08-20 DIAGNOSIS — M47.816 LUMBAR FACET ARTHROPATHY: ICD-10-CM

## 2021-08-20 DIAGNOSIS — Z74.09 DECREASED FUNCTIONAL MOBILITY AND ENDURANCE: ICD-10-CM

## 2021-08-20 DIAGNOSIS — M48.061 SPINAL STENOSIS OF LUMBAR REGION WITHOUT NEUROGENIC CLAUDICATION: Primary | ICD-10-CM

## 2021-08-20 PROCEDURE — 97110 THERAPEUTIC EXERCISES: CPT | Performed by: PHYSICAL THERAPIST

## 2021-08-20 PROCEDURE — 97112 NEUROMUSCULAR REEDUCATION: CPT | Performed by: PHYSICAL THERAPIST

## 2021-08-20 NOTE — PROGRESS NOTES
Physical Therapy Daily Progress Note        Subjective     Nyasia Dalal reports: No complaints. 10 minutes late to appointment    Objective   See Exercise, Manual, and Modality Logs for complete treatment.       Assessment/Plan  Pt doing well with exercise and balance progression. No c/o pain with therex but does get some leg fatigue    Progress per Plan of Care           Manual Therapy:         mins  40145;  Therapeutic Exercise: 15      mins  59897;     Neuromuscular Ethan:10       mins  15609;    Therapeutic Activity:    10     mins  59802;     Gait Training:         mins  77602;     Ultrasound:         mins  74672;    Electrical Stimulation:        mins  70402 ( );  Dry Needling         mins self-pay    Timed [id number]:   35   mins   Total Treatment:     35   mins    Erica Dan, PT  Physical Therapist  [place] License # [id number]

## 2021-08-23 ENCOUNTER — TREATMENT (OUTPATIENT)
Dept: PHYSICAL THERAPY | Facility: CLINIC | Age: 86
End: 2021-08-23

## 2021-08-23 DIAGNOSIS — M47.816 LUMBAR FACET ARTHROPATHY: ICD-10-CM

## 2021-08-23 DIAGNOSIS — M48.061 SPINAL STENOSIS OF LUMBAR REGION WITHOUT NEUROGENIC CLAUDICATION: Primary | ICD-10-CM

## 2021-08-23 DIAGNOSIS — Z74.09 DECREASED FUNCTIONAL MOBILITY AND ENDURANCE: ICD-10-CM

## 2021-08-23 PROCEDURE — 97110 THERAPEUTIC EXERCISES: CPT | Performed by: PHYSICAL THERAPIST

## 2021-08-23 PROCEDURE — 97116 GAIT TRAINING THERAPY: CPT | Performed by: PHYSICAL THERAPIST

## 2021-08-23 PROCEDURE — 97530 THERAPEUTIC ACTIVITIES: CPT | Performed by: PHYSICAL THERAPIST

## 2021-08-23 NOTE — PROGRESS NOTES
Physical Therapy Daily Treatment Note      Patient: Nyasia Dalal   : 1933  Referring practitioner: EHSAN Lopez  Date of Initial Visit: Type: THERAPY  Noted: 2021  Today's Date: 2021  Patient seen for 6 sessions         Nyasia Dalal reports:       Subjective     Objective   See Exercise, Manual, and Modality Logs for complete treatment.     Gait training x 10 min in/around clinic, down hallway and outside over uneven surfaces to car.  Verbal/tacle cues for arm swing, step and stride length and navigation/negotitiation of thresholds and changes of surfaces     Assessment/Plan  Compliant/Cooperative with rehab efforts.  Subjectively reports no increased symptoms or discomfort with therapeutic exercise today.  Able to perform  exercise/functional activities without rest breaks.  Benefits from verbal/tactile cues to ensure proper exercise technique and performance as well as sequencing with ambulation activities. .          Progress per Plan of Care toward all goals           Timed:  Manual Therapy:         mins  41262;  Therapeutic Exercise:   20      mins  77808;     Neuromuscular Ethan:        mins  05730;    Therapeutic Activity:    10      mins  32157;     Gait Training:     10      mins  05510;     Ultrasound:          mins  64100;      Untimed:  Electrical Stimulation:         mins  63531 ( );  Mechanical Traction:         mins  33453;     Timed Treatment:  40    mins   Total Treatment:   40     mins  Grady Johns PTA  Physical Therapist  Assistant  [id number]

## 2021-08-25 ENCOUNTER — TREATMENT (OUTPATIENT)
Dept: PHYSICAL THERAPY | Facility: CLINIC | Age: 86
End: 2021-08-25

## 2021-08-25 DIAGNOSIS — M48.061 SPINAL STENOSIS OF LUMBAR REGION WITHOUT NEUROGENIC CLAUDICATION: Primary | ICD-10-CM

## 2021-08-25 DIAGNOSIS — Z74.09 DECREASED FUNCTIONAL MOBILITY AND ENDURANCE: ICD-10-CM

## 2021-08-25 DIAGNOSIS — M47.816 LUMBAR FACET ARTHROPATHY: ICD-10-CM

## 2021-08-25 PROCEDURE — 97530 THERAPEUTIC ACTIVITIES: CPT | Performed by: PHYSICAL THERAPIST

## 2021-08-25 PROCEDURE — 97112 NEUROMUSCULAR REEDUCATION: CPT | Performed by: PHYSICAL THERAPIST

## 2021-08-25 NOTE — PROGRESS NOTES
Physical Therapy Daily Progress Note    Visit #7    Subjective     Nyasia Labhart reports: no new complaints.       Objective   See Exercise, Manual, and Modality Logs for complete treatment.       Assessment/Plan  Endurance with standing activities is improved.   Progress per Plan of Care           Manual Therapy:    0     mins  37523;  Therapeutic Exercise:    12     mins  72105;     Neuromuscular Ethan:    10    mins  92298;    Therapeutic Activity:     13     mins  46562;     Gait Trainin     mins  66429;     Ultrasound:     0     mins  47148;    Electrical Stimulation:    0     mins  99872 ( );    Timed Treatment:   35   mins   Total Treatment:     35   mins    Karol Edge PT, DPT  Physical Therapist  [place] License #[id number]

## 2021-08-30 ENCOUNTER — TREATMENT (OUTPATIENT)
Dept: PHYSICAL THERAPY | Facility: CLINIC | Age: 86
End: 2021-08-30

## 2021-08-30 DIAGNOSIS — Z74.09 DECREASED FUNCTIONAL MOBILITY AND ENDURANCE: ICD-10-CM

## 2021-08-30 DIAGNOSIS — M48.061 SPINAL STENOSIS OF LUMBAR REGION WITHOUT NEUROGENIC CLAUDICATION: Primary | ICD-10-CM

## 2021-08-30 DIAGNOSIS — M47.816 LUMBAR FACET ARTHROPATHY: ICD-10-CM

## 2021-08-30 PROCEDURE — 97112 NEUROMUSCULAR REEDUCATION: CPT | Performed by: PHYSICAL THERAPIST

## 2021-08-30 PROCEDURE — 97530 THERAPEUTIC ACTIVITIES: CPT | Performed by: PHYSICAL THERAPIST

## 2021-08-30 PROCEDURE — 97110 THERAPEUTIC EXERCISES: CPT | Performed by: PHYSICAL THERAPIST

## 2021-09-01 ENCOUNTER — TREATMENT (OUTPATIENT)
Dept: PHYSICAL THERAPY | Facility: CLINIC | Age: 86
End: 2021-09-01

## 2021-09-01 DIAGNOSIS — M48.061 SPINAL STENOSIS OF LUMBAR REGION WITHOUT NEUROGENIC CLAUDICATION: Primary | ICD-10-CM

## 2021-09-01 DIAGNOSIS — Z74.09 DECREASED FUNCTIONAL MOBILITY AND ENDURANCE: ICD-10-CM

## 2021-09-01 DIAGNOSIS — M47.816 LUMBAR FACET ARTHROPATHY: ICD-10-CM

## 2021-09-01 PROCEDURE — 97112 NEUROMUSCULAR REEDUCATION: CPT | Performed by: PHYSICAL THERAPIST

## 2021-09-01 PROCEDURE — 97530 THERAPEUTIC ACTIVITIES: CPT | Performed by: PHYSICAL THERAPIST

## 2021-09-01 NOTE — PROGRESS NOTES
Physical Therapy Daily Progress Note    Visit #9    Subjective     Nyasia Dalal reports: feeling good today.       Objective   See Exercise, Manual, and Modality Logs for complete treatment.       Assessment/Plan  Able to increase repetitions today without increased pain.   Progress per Plan of Care, reassess next visit           Manual Therapy:    0     mins  76744;  Therapeutic Exercise:    10     mins  38713;     Neuromuscular Ethan:    10    mins  44301;    Therapeutic Activity:     15     mins  03825;     Gait Trainin     mins  26945;     Ultrasound:     0     mins  51827;    Electrical Stimulation:    0     mins  60362 ( );    Timed Treatment:   35   mins   Total Treatment:     35   mins    Karol Edge PT, DPT  Physical Therapist  [place] License #[id number]

## 2021-09-08 ENCOUNTER — TREATMENT (OUTPATIENT)
Dept: PHYSICAL THERAPY | Facility: CLINIC | Age: 86
End: 2021-09-08

## 2021-09-08 DIAGNOSIS — M48.061 SPINAL STENOSIS OF LUMBAR REGION WITHOUT NEUROGENIC CLAUDICATION: Primary | ICD-10-CM

## 2021-09-08 DIAGNOSIS — Z74.09 DECREASED FUNCTIONAL MOBILITY AND ENDURANCE: ICD-10-CM

## 2021-09-08 DIAGNOSIS — M47.816 LUMBAR FACET ARTHROPATHY: ICD-10-CM

## 2021-09-08 PROCEDURE — 97530 THERAPEUTIC ACTIVITIES: CPT | Performed by: PHYSICAL THERAPIST

## 2021-09-08 PROCEDURE — 97112 NEUROMUSCULAR REEDUCATION: CPT | Performed by: PHYSICAL THERAPIST

## 2021-09-08 PROCEDURE — 97110 THERAPEUTIC EXERCISES: CPT | Performed by: PHYSICAL THERAPIST

## 2021-09-10 ENCOUNTER — TREATMENT (OUTPATIENT)
Dept: PHYSICAL THERAPY | Facility: CLINIC | Age: 86
End: 2021-09-10

## 2021-09-10 DIAGNOSIS — Z74.09 DECREASED FUNCTIONAL MOBILITY AND ENDURANCE: ICD-10-CM

## 2021-09-10 DIAGNOSIS — M47.816 LUMBAR FACET ARTHROPATHY: ICD-10-CM

## 2021-09-10 DIAGNOSIS — M48.061 SPINAL STENOSIS OF LUMBAR REGION WITHOUT NEUROGENIC CLAUDICATION: Primary | ICD-10-CM

## 2021-09-10 PROCEDURE — 97110 THERAPEUTIC EXERCISES: CPT | Performed by: PHYSICAL THERAPIST

## 2021-09-10 PROCEDURE — 97112 NEUROMUSCULAR REEDUCATION: CPT | Performed by: PHYSICAL THERAPIST

## 2021-09-10 PROCEDURE — 97530 THERAPEUTIC ACTIVITIES: CPT | Performed by: PHYSICAL THERAPIST

## 2021-09-10 NOTE — PROGRESS NOTES
Physical Therapy Daily Progress Note    Patient: Nyasia Dalal   : 1933  Diagnosis/ICD-10 Code:  Spinal stenosis of lumbar region without neurogenic claudication [M48.061]  Referring practitioner: EHSAN Lopez  Date of Initial Visit: Type: THERAPY  Noted: 2021  Today's Date: 9/10/2021  Patient seen for 11 sessions         Nyasia Dalal reports: unsteady today. Legs do not feel weak.    Subjective     Objective   See Exercise, Manual, and Modality Logs for complete treatment.       Assessment/Plan  Subjectively, pt reports no increase of pain or discomfort with interventions performed today. Performed well with continued exercises focused on LE strength and stability as well as overall balance. Continues to demonstrate moderate difficulty with single leg stance activity without us of UE. Continues to benefit from verbal/tactile cues to ensure proper form and technique for exercise performance.     Progress per Plan of Care           Manual Therapy:         mins  35421;  Therapeutic Exercise:    20     mins  21838;     Neuromuscular Ethan:    10    mins  71856;    Therapeutic Activity:     10     mins  24156;     Gait Training:           mins  79043;     Ultrasound:          mins  99276;    Electrical Stimulation:         mins  94427 ( );  Dry Needling          mins self-pay    Timed Treatment:   40   mins   Total Treatment:     40   mins    Ella Veliz PTA  Physical Therapist Assistant [id number]

## 2021-09-10 NOTE — PROGRESS NOTES
Physical Therapy Daily Progress Note    Visit #10    Subjective     Nyasia Dalal reports: feeling OK today.       Objective   See Exercise, Manual, and Modality Logs for complete treatment.       Assessment/Plan  Improved endurance noted, pt able to complete exercise program with fewer rest breaks. Also noted improvements in hip flexor strength, balance with activities without UE support.   Progress per Plan of Care           Manual Therapy:    0     mins  28818;  Therapeutic Exercise:    10     mins  00483;     Neuromuscular Ethan:    10    mins  02328;    Therapeutic Activity:     18     mins  53528;     Gait Trainin     mins  94669;     Ultrasound:     0     mins  14422;    Electrical Stimulation:    0     mins  41600 ( );    Timed Treatment:   38   mins   Total Treatment:     38   mins    Karol Edge PT, DPT  Physical Therapist  [place] License #[id number]

## 2021-09-16 ENCOUNTER — TREATMENT (OUTPATIENT)
Dept: PHYSICAL THERAPY | Facility: CLINIC | Age: 86
End: 2021-09-16

## 2021-09-16 DIAGNOSIS — Z74.09 DECREASED FUNCTIONAL MOBILITY AND ENDURANCE: ICD-10-CM

## 2021-09-16 DIAGNOSIS — M47.816 LUMBAR FACET ARTHROPATHY: ICD-10-CM

## 2021-09-16 DIAGNOSIS — M48.061 SPINAL STENOSIS OF LUMBAR REGION WITHOUT NEUROGENIC CLAUDICATION: Primary | ICD-10-CM

## 2021-09-16 PROCEDURE — 97110 THERAPEUTIC EXERCISES: CPT | Performed by: PHYSICAL THERAPIST

## 2021-09-16 PROCEDURE — 97112 NEUROMUSCULAR REEDUCATION: CPT | Performed by: PHYSICAL THERAPIST

## 2021-09-16 PROCEDURE — 97530 THERAPEUTIC ACTIVITIES: CPT | Performed by: PHYSICAL THERAPIST

## 2021-09-16 NOTE — PROGRESS NOTES
Physical Therapy Daily Progress Note    Patient: Nyasia Dalal   : 1933  Diagnosis/ICD-10 Code:  Spinal stenosis of lumbar region without neurogenic claudication [M48.061]  Referring practitioner: EHSAN Lopez  Date of Initial Visit: Type: THERAPY  Noted: 2021  Today's Date: 2021  Patient seen for 12 sessions         Nyasia Dalal reports: no new complaints.    Subjective     Objective   See Exercise, Manual, and Modality Logs for complete treatment.       Assessment/Plan  Subjectively, pt reports no increase of pain or discomfort with interventions performed today. Performed well with continued exercises for LE and core strengthening and stability. Continues to demonstrate difficulty with standing on one LE without UE assistance. Re-educated in regards to bed mobility techniques and stretches to do before getting out of bed. Continues to benefit from verbal/tactile cues to ensure proper form and technique for exercise performance.     Progress per Plan of Care           Manual Therapy:         mins  12725;  Therapeutic Exercise:    20     mins  45333;     Neuromuscular Ethan:    10    mins  24370;    Therapeutic Activity:     10     mins  14722;     Gait Training:           mins  19064;     Ultrasound:          mins  19098;    Electrical Stimulation:         mins  15664 ( );  Dry Needling          mins self-pay    Timed Treatment:   40   mins   Total Treatment:     40   mins    Ella Veliz PTA  Physical Therapist Assistant [id number]

## 2021-09-20 ENCOUNTER — TREATMENT (OUTPATIENT)
Dept: PHYSICAL THERAPY | Facility: CLINIC | Age: 86
End: 2021-09-20

## 2021-09-20 DIAGNOSIS — Z74.09 DECREASED FUNCTIONAL MOBILITY AND ENDURANCE: ICD-10-CM

## 2021-09-20 DIAGNOSIS — M48.061 SPINAL STENOSIS OF LUMBAR REGION WITHOUT NEUROGENIC CLAUDICATION: Primary | ICD-10-CM

## 2021-09-20 DIAGNOSIS — M47.816 LUMBAR FACET ARTHROPATHY: ICD-10-CM

## 2021-09-20 PROCEDURE — 97112 NEUROMUSCULAR REEDUCATION: CPT | Performed by: PHYSICAL THERAPIST

## 2021-09-20 PROCEDURE — 97530 THERAPEUTIC ACTIVITIES: CPT | Performed by: PHYSICAL THERAPIST

## 2021-09-20 PROCEDURE — 97110 THERAPEUTIC EXERCISES: CPT | Performed by: PHYSICAL THERAPIST

## 2021-09-20 NOTE — PROGRESS NOTES
Re-Assessment / Re-Certification    Patient: Nyasia Dalal   : 1933  Diagnosis/ICD-10 Code:  Spinal stenosis of lumbar region without neurogenic claudication [M48.061]  Referring practitioner: EHSAN Lopez  Date of Initial Visit: 2021  Today's Date: 2021  Patient seen for 13 sessions      Subjective:   Nyasia Dalal reports: she feels that PT is helping her get stronger.   Subjective Questionnaire: Oswestry: 48% limitation  Clinical Progress: improved  Home Program Compliance: Yes  Treatment has included: therapeutic exercise, neuromuscular re-education and therapeutic activity    Subjective   Objective          Functional Assessment     Comments  TUG = 18 seconds no AD  5xSTS = 29 seconds no UE assist      Assessment/Plan  Progress toward previous goals: Partially Met    SHORT TERM GOALS:  3 weeks                        1. Pt independent with HEP with minimal increase in pain (MET)  2. Pt to demonstrate B hip strength by 1/2 grade to improve stability with ambulation  (MET)  3. Pt to report being able stand for greater than 5 min with pain at a level of 5/10 or less (MET)    LONG TERM GOALS:   6 weeks  1. Pt to demonstrate ability to perform full functional squat with good form and control of the hips and without increasing pain (PROGRESSING)  2. Pt to demonstrate B hip strength to 4/5 or greater to improve safety with ambulation on uneven surfaces (PROGRESSING)  3. Pt to demonstrate ability to navigate stairs reciprocally with pain 2/10 or less safely with one HR (PROGRESSING)  4.  Pt with 10% improvement in Modified Oswestry score indicating improved perceived functional ability (NOT MET)  5. Demo TUG in 20 seconds or less for improved functional mobility and decreased fall risk. (MET)  6. Demo TUB in 13 seconds or less for improved functional mobility and decreased fall risk. (NEW GOAL)      Recommendations: Continue as planned  Timeframe: 1 month  Prognosis to achieve goals:  good    PT Signature: Karol Edge, PT, DPT                         Physical Therapist                         [place] License #[id number]    Based upon review of the patient's progress and continued therapy plan, it is my medical opinion that Nyasia Dalal should continue physical therapy treatment at [facility]  [address] [facility], [address] [address] [phone].    Signature: __________________________________  Marisa Edge APRN    Manual Therapy:    0     mins  00189;  Therapeutic Exercise:    15     mins  91938;     Neuromuscular Ethan:    10    mins  06467;    Therapeutic Activity:     14     mins  95538;     Gait Trainin     mins  56462;     Ultrasound:     0     mins  33610;    Electrical Stimulation:    0     mins  37282 ( );    Timed Treatment:   39   mins   Total Treatment:     39   mins

## 2021-10-06 ENCOUNTER — TREATMENT (OUTPATIENT)
Dept: PHYSICAL THERAPY | Facility: CLINIC | Age: 86
End: 2021-10-06

## 2021-10-06 DIAGNOSIS — Z74.09 DECREASED FUNCTIONAL MOBILITY AND ENDURANCE: ICD-10-CM

## 2021-10-06 DIAGNOSIS — M48.061 SPINAL STENOSIS OF LUMBAR REGION WITHOUT NEUROGENIC CLAUDICATION: Primary | ICD-10-CM

## 2021-10-06 DIAGNOSIS — M47.816 LUMBAR FACET ARTHROPATHY: ICD-10-CM

## 2021-10-06 PROCEDURE — 97110 THERAPEUTIC EXERCISES: CPT | Performed by: PHYSICAL THERAPIST

## 2021-10-06 PROCEDURE — 97112 NEUROMUSCULAR REEDUCATION: CPT | Performed by: PHYSICAL THERAPIST

## 2021-10-06 PROCEDURE — 97530 THERAPEUTIC ACTIVITIES: CPT | Performed by: PHYSICAL THERAPIST

## 2021-10-11 ENCOUNTER — TREATMENT (OUTPATIENT)
Dept: PHYSICAL THERAPY | Facility: CLINIC | Age: 86
End: 2021-10-11

## 2021-10-11 DIAGNOSIS — M48.061 SPINAL STENOSIS OF LUMBAR REGION WITHOUT NEUROGENIC CLAUDICATION: Primary | ICD-10-CM

## 2021-10-11 DIAGNOSIS — M47.816 LUMBAR FACET ARTHROPATHY: ICD-10-CM

## 2021-10-11 DIAGNOSIS — Z74.09 DECREASED FUNCTIONAL MOBILITY AND ENDURANCE: ICD-10-CM

## 2021-10-11 PROCEDURE — 97112 NEUROMUSCULAR REEDUCATION: CPT | Performed by: PHYSICAL THERAPIST

## 2021-10-11 PROCEDURE — 97110 THERAPEUTIC EXERCISES: CPT | Performed by: PHYSICAL THERAPIST

## 2021-10-11 PROCEDURE — 97530 THERAPEUTIC ACTIVITIES: CPT | Performed by: PHYSICAL THERAPIST

## 2021-10-11 NOTE — PROGRESS NOTES
Physical Therapy Daily Progress Note    Visit #15    Subjective     Nyasia Mulugeta reports: her left hip is sore today.       Objective   See Exercise, Manual, and Modality Logs for complete treatment.       Assessment/Plan  Added gentle STM and heat to left hip, pain reduced after session. Will assess overall effect at next visit.  Progress per Plan of Care, reassess next visit.           Manual Therapy:    8     mins  72277;  Therapeutic Exercise:    10     mins  18088;     Neuromuscular Ethan:    12    mins  43155;    Therapeutic Activity:     20     mins  66806;     Gait Trainin     mins  80108;     Ultrasound:     0     mins  10901;    Electrical Stimulation:    0     mins  67006 ( );    Timed Treatment:   50   mins   Total Treatment:     50   mins    Karol Edge PT, DPT  Physical Therapist  [place] License #[id number]

## 2021-10-13 ENCOUNTER — TREATMENT (OUTPATIENT)
Dept: PHYSICAL THERAPY | Facility: CLINIC | Age: 86
End: 2021-10-13

## 2021-10-13 DIAGNOSIS — M47.816 LUMBAR FACET ARTHROPATHY: ICD-10-CM

## 2021-10-13 DIAGNOSIS — M48.061 SPINAL STENOSIS OF LUMBAR REGION WITHOUT NEUROGENIC CLAUDICATION: Primary | ICD-10-CM

## 2021-10-13 DIAGNOSIS — Z74.09 DECREASED FUNCTIONAL MOBILITY AND ENDURANCE: ICD-10-CM

## 2021-10-13 PROCEDURE — 97112 NEUROMUSCULAR REEDUCATION: CPT | Performed by: PHYSICAL THERAPIST

## 2021-10-13 PROCEDURE — 97530 THERAPEUTIC ACTIVITIES: CPT | Performed by: PHYSICAL THERAPIST

## 2021-10-13 PROCEDURE — 97110 THERAPEUTIC EXERCISES: CPT | Performed by: PHYSICAL THERAPIST

## 2021-10-13 NOTE — PROGRESS NOTES
Re-Assessment / Re-Certification     Patient: Nyasia Dalal   : 1933  Diagnosis/ICD-10 Code:  Spinal stenosis of lumbar region without neurogenic claudication [M48.061]  Referring practitioner: EHSAN Lopez  Date of Initial Visit: 2021  Today's Date: 10/13/2021  Patient seen for 16 sessions        Subjective:   Nyasia Dalal reports: PT is helping her be more confident when walking. Hip pain is resolved.   Subjective Questionnaire: LEFS   Clinical Progress: improved  Home Program Compliance: Yes  Treatment has included: therapeutic exercise, neuromuscular re-education and therapeutic activity     Subjective   Objective            Functional Assessment      Comments  TUG = 17 seconds no AD  5xSTS = 28 seconds no UE assist      Assessment/Plan  Progress toward previous goals: Partially Met     SHORT TERM GOALS:  3 weeks                        1. Pt independent with HEP with minimal increase in pain (MET)  2. Pt to demonstrate B hip strength by 1/2 grade to improve stability with ambulation  (MET)  3. Pt to report being able stand for greater than 5 min with pain at a level of 5/10 or less (MET)    LONG TERM GOALS:   6 weeks  1. Pt to demonstrate ability to perform full functional squat with good form and control of the hips and without increasing pain (PROGRESSING)  2. Pt to demonstrate B hip strength to 4/5 or greater to improve safety with ambulation on uneven surfaces (PROGRESSING)  3. Pt to demonstrate ability to navigate stairs reciprocally with pain 2/10 or less safely with one HR (PROGRESSING)  4.  Pt with 10% improvement in LEFS score indicating improved perceived functional ability (NEW GOAL, shifted from Oswestry)  5. Demo TUG in 13 seconds or less for improved functional mobility and decreased fall risk. (PROGRESSING)                Recommendations: Continue as planned focusing on functional mobility and balance activities for reduction of fall risk.  Timeframe: 1  month  Prognosis to achieve goals: good     PT Signature: Karol Edge, PT, DPT                         Physical Therapist                         [place] License #[id number]     Based upon review of the patient's progress and continued therapy plan, it is my medical opinion that Nyasia Dalal should continue physical therapy treatment at [facility]  [address] [address] [phone].     Signature: __________________________________  Marisa Edge APRN     Manual Therapy:            0     mins  53310;  Therapeutic Exercise:   10     mins  06651;     Neuromuscular Ethan:   17    mins  11561;    Therapeutic Activity:      18     mins  78237;     Gait Trainin     mins  73618;     Ultrasound:                     0     mins  71805;    Electrical Stimulation:    0     mins  18773 ( );     Timed Treatment:   45   mins   Total Treatment:     45   mins

## 2021-10-18 ENCOUNTER — TREATMENT (OUTPATIENT)
Dept: PHYSICAL THERAPY | Facility: CLINIC | Age: 86
End: 2021-10-18

## 2021-10-18 DIAGNOSIS — M47.816 LUMBAR FACET ARTHROPATHY: ICD-10-CM

## 2021-10-18 DIAGNOSIS — M48.061 SPINAL STENOSIS OF LUMBAR REGION WITHOUT NEUROGENIC CLAUDICATION: Primary | ICD-10-CM

## 2021-10-18 DIAGNOSIS — Z74.09 DECREASED FUNCTIONAL MOBILITY AND ENDURANCE: ICD-10-CM

## 2021-10-18 PROCEDURE — 97530 THERAPEUTIC ACTIVITIES: CPT | Performed by: PHYSICAL THERAPIST

## 2021-10-18 PROCEDURE — 97112 NEUROMUSCULAR REEDUCATION: CPT | Performed by: PHYSICAL THERAPIST

## 2021-10-18 PROCEDURE — 97110 THERAPEUTIC EXERCISES: CPT | Performed by: PHYSICAL THERAPIST

## 2021-10-19 NOTE — PROGRESS NOTES
Physical Therapy Daily Progress Note    Patient: Nyasia Dalal   : 1933  Diagnosis/ICD-10 Code:  Spinal stenosis of lumbar region without neurogenic claudication [M48.061]  Referring practitioner: EHSAN Lopez  Date of Initial Visit: Type: THERAPY  Noted: 2021  Today's Date: 10/19/2021  Patient seen for 17 sessions         Nyasia Dalal reports: no new complaints.    Subjective     Objective   See Exercise, Manual, and Modality Logs for complete treatment.       Assessment/Plan  Subjectively, pt reports no increase of pain or discomfort with interventions performed today. Performed well with established interventions with notably improved single leg balance, especially on R LE. Continues to demonstrate excellent tolerance to exercise progressions and does well with visual/tactile cues. Continues to benefit from verbal/tactile cues to ensure proper form and technique for exercise performance.     Progress per Plan of Care           Manual Therapy:         mins  95514;  Therapeutic Exercise:    25     mins  54769;     Neuromuscular Ethan:    10    mins  20709;    Therapeutic Activity:     10     mins  28574;     Gait Training:           mins  26505;     Ultrasound:          mins  57386;    Electrical Stimulation:         mins  35901 ( );  Dry Needling          mins self-pay    Timed Treatment:   45   mins   Total Treatment:     45   mins    Ella Veliz PTA  Physical Therapist Assistant [id number]

## 2021-10-21 ENCOUNTER — TREATMENT (OUTPATIENT)
Dept: PHYSICAL THERAPY | Facility: CLINIC | Age: 86
End: 2021-10-21

## 2021-10-21 DIAGNOSIS — M47.816 LUMBAR FACET ARTHROPATHY: ICD-10-CM

## 2021-10-21 DIAGNOSIS — Z74.09 DECREASED FUNCTIONAL MOBILITY AND ENDURANCE: ICD-10-CM

## 2021-10-21 DIAGNOSIS — M48.061 SPINAL STENOSIS OF LUMBAR REGION WITHOUT NEUROGENIC CLAUDICATION: Primary | ICD-10-CM

## 2021-10-21 PROCEDURE — 97110 THERAPEUTIC EXERCISES: CPT | Performed by: PHYSICAL THERAPIST

## 2021-10-21 PROCEDURE — 97530 THERAPEUTIC ACTIVITIES: CPT | Performed by: PHYSICAL THERAPIST

## 2021-10-21 PROCEDURE — 97112 NEUROMUSCULAR REEDUCATION: CPT | Performed by: PHYSICAL THERAPIST

## 2021-10-21 NOTE — PROGRESS NOTES
Physical Therapy Daily Progress Note    Patient: Nyasia Dalal   : 1933  Diagnosis/ICD-10 Code:  Spinal stenosis of lumbar region without neurogenic claudication [M48.061]  Referring practitioner: EHSAN Lopez  Date of Initial Visit: Type: THERAPY  Noted: 2021  Today's Date: 10/21/2021  Patient seen for 18 sessions         Nyasia Dalal reports: still have trouble getting out of bed.    Subjective     Objective   See Exercise, Manual, and Modality Logs for complete treatment.       Assessment/Plan  Subjectively, pt reports no increase of pain or discomfort with interventions performed today. Performed well with bed mobility interventions today. Pt was instructed in sequencing of supine to side-lying to sit, we then did other strengthening  exercises and pt was then able to demonstrate correct bed mobility sequencing with no need for cues. Continues to demonstrate improvements in strength and exercise technique. Continues to benefit from verbal/tactile cues to ensure proper form and technique for exercise performance.     Progress per Plan of Care           Manual Therapy:         mins  39291;  Therapeutic Exercise:    15     mins  47106;     Neuromuscular Ethan:    15    mins  46303;    Therapeutic Activity:     15     mins  19184;     Gait Training:           mins  36793;     Ultrasound:          mins  29045;    Electrical Stimulation:         mins  72009 ( );  Dry Needling          mins self-pay    Timed Treatment:   45   mins   Total Treatment:     45   mins    Ella Veliz PTA  Physical Therapist Assistant [phone]

## 2021-10-25 ENCOUNTER — TREATMENT (OUTPATIENT)
Dept: PHYSICAL THERAPY | Facility: CLINIC | Age: 86
End: 2021-10-25

## 2021-10-25 DIAGNOSIS — M48.061 SPINAL STENOSIS OF LUMBAR REGION WITHOUT NEUROGENIC CLAUDICATION: Primary | ICD-10-CM

## 2021-10-25 DIAGNOSIS — Z74.09 DECREASED FUNCTIONAL MOBILITY AND ENDURANCE: ICD-10-CM

## 2021-10-25 DIAGNOSIS — M47.816 LUMBAR FACET ARTHROPATHY: ICD-10-CM

## 2021-10-25 PROCEDURE — 97110 THERAPEUTIC EXERCISES: CPT | Performed by: PHYSICAL THERAPIST

## 2021-10-25 PROCEDURE — 97116 GAIT TRAINING THERAPY: CPT | Performed by: PHYSICAL THERAPIST

## 2021-10-27 ENCOUNTER — TREATMENT (OUTPATIENT)
Dept: PHYSICAL THERAPY | Facility: CLINIC | Age: 86
End: 2021-10-27

## 2021-10-27 DIAGNOSIS — M47.816 LUMBAR FACET ARTHROPATHY: ICD-10-CM

## 2021-10-27 DIAGNOSIS — M48.061 SPINAL STENOSIS OF LUMBAR REGION WITHOUT NEUROGENIC CLAUDICATION: Primary | ICD-10-CM

## 2021-10-27 DIAGNOSIS — Z74.09 DECREASED FUNCTIONAL MOBILITY AND ENDURANCE: ICD-10-CM

## 2021-10-27 PROCEDURE — 97530 THERAPEUTIC ACTIVITIES: CPT | Performed by: PHYSICAL THERAPIST

## 2021-10-27 PROCEDURE — 97112 NEUROMUSCULAR REEDUCATION: CPT | Performed by: PHYSICAL THERAPIST

## 2021-11-01 ENCOUNTER — TREATMENT (OUTPATIENT)
Dept: PHYSICAL THERAPY | Facility: CLINIC | Age: 86
End: 2021-11-01

## 2021-11-01 DIAGNOSIS — M48.061 SPINAL STENOSIS OF LUMBAR REGION WITHOUT NEUROGENIC CLAUDICATION: Primary | ICD-10-CM

## 2021-11-01 DIAGNOSIS — M47.816 LUMBAR FACET ARTHROPATHY: ICD-10-CM

## 2021-11-01 DIAGNOSIS — Z74.09 DECREASED FUNCTIONAL MOBILITY AND ENDURANCE: ICD-10-CM

## 2021-11-01 PROCEDURE — 97116 GAIT TRAINING THERAPY: CPT | Performed by: PHYSICAL THERAPIST

## 2021-11-01 PROCEDURE — 97110 THERAPEUTIC EXERCISES: CPT | Performed by: PHYSICAL THERAPIST

## 2021-11-01 PROCEDURE — 97530 THERAPEUTIC ACTIVITIES: CPT | Performed by: PHYSICAL THERAPIST

## 2021-11-03 NOTE — PROGRESS NOTES
Physical Therapy Daily Progress Note    Visit #20    Subjective     Nyasia Dalal reports: feeling good today      Objective   See Exercise, Manual, and Modality Logs for complete treatment.       Assessment/Plan  Pt is making consistent progress each visit with regard to gait, balance and strength. Will benefit from continued PT focused on functional mobility and fall risk reduction.   Progress per Plan of Care           Manual Therapy:    0     mins  35737;  Therapeutic Exercise:    10     mins  35667;     Neuromuscular Ethan:    0    mins  78479;    Therapeutic Activity:     21     mins  15765;     Gait Trainin     mins  44058;     Ultrasound:     0     mins  50143;    Electrical Stimulation:    0     mins  95221 ( );    Timed Treatment:   39   mins   Total Treatment:     39   mins    Karol Edge, PT, DPT  Physical Therapist  [place] License #[id number]

## 2021-11-04 ENCOUNTER — TREATMENT (OUTPATIENT)
Dept: PHYSICAL THERAPY | Facility: CLINIC | Age: 86
End: 2021-11-04

## 2021-11-04 DIAGNOSIS — M47.816 LUMBAR FACET ARTHROPATHY: ICD-10-CM

## 2021-11-04 DIAGNOSIS — Z74.09 DECREASED FUNCTIONAL MOBILITY AND ENDURANCE: ICD-10-CM

## 2021-11-04 DIAGNOSIS — M48.061 SPINAL STENOSIS OF LUMBAR REGION WITHOUT NEUROGENIC CLAUDICATION: Primary | ICD-10-CM

## 2021-11-04 PROCEDURE — 97112 NEUROMUSCULAR REEDUCATION: CPT | Performed by: PHYSICAL THERAPIST

## 2021-11-04 PROCEDURE — 97110 THERAPEUTIC EXERCISES: CPT | Performed by: PHYSICAL THERAPIST

## 2021-11-04 PROCEDURE — 97116 GAIT TRAINING THERAPY: CPT | Performed by: PHYSICAL THERAPIST

## 2021-11-04 NOTE — PROGRESS NOTES
Physical Therapy Daily Progress Note    Patient: Nyasia Dalal   : 1933  Diagnosis/ICD-10 Code:  Spinal stenosis of lumbar region without neurogenic claudication [M48.061]  Referring practitioner: EHSAN Lopez  Date of Initial Visit: Type: THERAPY  Noted: 2021  Today's Date: 2021  Patient seen for 21 sessions         Nyasia Dalal reports: no new complaints.    Subjective     Objective   See Exercise, Manual, and Modality Logs for complete treatment.       Assessment/Plan  Subjectively, pt reports no increase of pain or discomfort with interventions performed today. Performed well with all interventions for increasing strength, improving gait mechanics, and improving sequencing/technique of exercise. Continues to demonstrate excellent tolerance to exercise progressions. Continues to benefit from verbal/tactile cues to ensure proper form and technique for exercise performance.     Progress per Plan of Care           Manual Therapy:         mins  39335;  Therapeutic Exercise:    15     mins  49612;     Neuromuscular Ethan:    15    mins  18107;    Therapeutic Activity:          mins  89321;     Gait Training:      15     mins  28131;     Ultrasound:          mins  83287;    Electrical Stimulation:         mins  89621 ( );  Dry Needling          mins self-pay    Timed Treatment:   45   mins   Total Treatment:     45   mins    Ella Veliz PTA  Physical Therapist Assistant [phone]

## 2021-11-08 ENCOUNTER — TREATMENT (OUTPATIENT)
Dept: PHYSICAL THERAPY | Facility: CLINIC | Age: 86
End: 2021-11-08

## 2021-11-08 DIAGNOSIS — Z74.09 DECREASED FUNCTIONAL MOBILITY AND ENDURANCE: ICD-10-CM

## 2021-11-08 DIAGNOSIS — M47.816 LUMBAR FACET ARTHROPATHY: ICD-10-CM

## 2021-11-08 DIAGNOSIS — M48.061 SPINAL STENOSIS OF LUMBAR REGION WITHOUT NEUROGENIC CLAUDICATION: Primary | ICD-10-CM

## 2021-11-08 PROCEDURE — 97116 GAIT TRAINING THERAPY: CPT | Performed by: PHYSICAL THERAPIST

## 2021-11-08 PROCEDURE — 97110 THERAPEUTIC EXERCISES: CPT | Performed by: PHYSICAL THERAPIST

## 2021-11-08 PROCEDURE — 97112 NEUROMUSCULAR REEDUCATION: CPT | Performed by: PHYSICAL THERAPIST

## 2021-11-10 NOTE — PROGRESS NOTES
Physical Therapy Daily Progress Note    Visit #22    Subjective     Nyasia Dalal reports: feeling good      Objective   See Exercise, Manual, and Modality Logs for complete treatment.       Assessment/Plan  Pt cooperative and adherent with current rehab plan, and making good progress toward goals.   Progress per Plan of Care           Manual Therapy:    0     mins  74892;  Therapeutic Exercise:    15     mins  70184;     Neuromuscular Ethan:    20    mins  29311;    Therapeutic Activity:     0     mins  48671;     Gait Training:      10     mins  37223;     Ultrasound:     0     mins  21124;    Electrical Stimulation:    0     mins  36980 ( );    Timed Treatment:   45   mins   Total Treatment:     45   mins    Karol Edge PT, DPT  Physical Therapist  [place] License #[id number]

## 2021-11-11 ENCOUNTER — TREATMENT (OUTPATIENT)
Dept: PHYSICAL THERAPY | Facility: CLINIC | Age: 86
End: 2021-11-11

## 2021-11-11 DIAGNOSIS — M48.061 SPINAL STENOSIS OF LUMBAR REGION WITHOUT NEUROGENIC CLAUDICATION: Primary | ICD-10-CM

## 2021-11-11 DIAGNOSIS — M47.816 LUMBAR FACET ARTHROPATHY: ICD-10-CM

## 2021-11-11 DIAGNOSIS — Z74.09 DECREASED FUNCTIONAL MOBILITY AND ENDURANCE: ICD-10-CM

## 2021-11-11 PROCEDURE — 97110 THERAPEUTIC EXERCISES: CPT | Performed by: PHYSICAL THERAPIST

## 2021-11-11 PROCEDURE — 97112 NEUROMUSCULAR REEDUCATION: CPT | Performed by: PHYSICAL THERAPIST

## 2021-11-11 PROCEDURE — 97530 THERAPEUTIC ACTIVITIES: CPT | Performed by: PHYSICAL THERAPIST

## 2021-11-11 NOTE — PROGRESS NOTES
Physical Therapy Daily Progress Note    Patient: Nyasia Dalal   : 1933  Diagnosis/ICD-10 Code:  Spinal stenosis of lumbar region without neurogenic claudication [M48.061]  Referring practitioner: EHSAN Lopez  Date of Initial Visit: Type: THERAPY  Noted: 2021  Today's Date: 2021  Patient seen for 23 sessions         Nyasia Dalal reports: no new complaints.    Subjective     Objective   See Exercise, Manual, and Modality Logs for complete treatment.       Assessment/Plan  Subjectively, pt reports no increase of pain or discomfort with interventions performed today. Performed well with continued interventions for LE strengthening and balance activities. Continues to demonstrate imporved exercise tolerance and improved form and technique with fewer cues. Continues to benefit from verbal/tactile cues to ensure proper form and technique for exercise performance.     Progress per Plan of Care           Manual Therapy:         mins  40262;  Therapeutic Exercise:    25     mins  64126;     Neuromuscular Ethan:    10    mins  45051;    Therapeutic Activity:     10     mins  82782;     Gait Training:           mins  82339;     Ultrasound:          mins  45615;    Electrical Stimulation:         mins  90065 ( );  Dry Needling          mins self-pay    Timed Treatment:   45   mins   Total Treatment:     45   mins    Ella Veliz PTA  Physical Therapist Assistant [id number]

## 2021-11-15 ENCOUNTER — TREATMENT (OUTPATIENT)
Dept: PHYSICAL THERAPY | Facility: CLINIC | Age: 86
End: 2021-11-15

## 2021-11-15 DIAGNOSIS — M47.816 LUMBAR FACET ARTHROPATHY: ICD-10-CM

## 2021-11-15 DIAGNOSIS — M48.061 SPINAL STENOSIS OF LUMBAR REGION WITHOUT NEUROGENIC CLAUDICATION: Primary | ICD-10-CM

## 2021-11-15 DIAGNOSIS — Z74.09 DECREASED FUNCTIONAL MOBILITY AND ENDURANCE: ICD-10-CM

## 2021-11-15 PROCEDURE — 97116 GAIT TRAINING THERAPY: CPT | Performed by: PHYSICAL THERAPIST

## 2021-11-15 PROCEDURE — 97112 NEUROMUSCULAR REEDUCATION: CPT | Performed by: PHYSICAL THERAPIST

## 2021-11-15 PROCEDURE — 97110 THERAPEUTIC EXERCISES: CPT | Performed by: PHYSICAL THERAPIST

## 2021-11-15 NOTE — PROGRESS NOTES
Re-Assessment / Re-Certification    Patient: Nyasia Dalal   : 1933  Diagnosis/ICD-10 Code:  Spinal stenosis of lumbar region without neurogenic claudication [M48.061]  Referring practitioner: EHSAN Lopez  Date of Initial Visit: 2021  Today's Date: 11/15/2021  Patient seen for 24 sessions      Subjective:   Nyasia Dalal reports: she feels good when she exercises. Daughter notes that Nyasia still reaches out for support when walking, even though she is independent and safe walking on her own.   Clinical Progress: improved  Home Program Compliance: Yes  Treatment has included: therapeutic exercise, neuromuscular re-education, therapeutic activity and gait training    Subjective   Objective          Strength/Myotome Testing     Left Hip   Planes of Motion   Flexion: 4  Extension: 4-  Abduction: 4    Right Hip   Planes of Motion   Flexion: 4  Extension: 4-  Abduction: 4      Assessment/Plan  Progress toward previous goals: Partially Met    SHORT TERM GOALS:  3 weeks                        1. Pt independent with HEP with minimal increase in pain (MET)  2. Pt to demonstrate B hip strength by 1/2 grade to improve stability with ambulation  (MET)  3. Pt to report being able stand for greater than 5 min with pain at a level of 5/10 or less (MET)    LONG TERM GOALS:   6 weeks  1. Pt to demonstrate ability to perform full functional squat with good form and control of the hips and without increasing pain (PROGRESSING)  2. Pt to demonstrate B hip strength to 4/5 or greater to improve safety with ambulation on uneven surfaces (PROGRESSING)  3. Pt to demonstrate ability to navigate stairs reciprocally with pain 2/10 or less safely with one HR (PROGRESSING)  4.  Pt with 10% improvement in LEFS score indicating improved perceived functional ability (NOT MET)  5. Demo TUG in 13 seconds or less for improved functional mobility and decreased fall risk. (PROGRESSING)      Recommendations: Continue as  planned  Timeframe: 1 month  Prognosis to achieve goals: good     Certification period 11/15/2021 to 2022    PT Signature: Karol Edge, PT, DPT                         Physical Therapist                         [place] License #[id number]    Based upon review of the patient's progress and continued therapy plan, it is my medical opinion that Nyasia Dalal should continue physical therapy treatment at [facility]  [address] [place] [id number] [phone].    Signature: __________________________________  Marisa Edge APRN     Manual Therapy:    0     mins  49810;  Therapeutic Exercise:    10     mins  03994;     Neuromuscular Ethan:    12    mins  55238;    Therapeutic Activity:     5     mins  99549;     Gait Trainin     mins  59109;     Ultrasound:     0     mins  07952;    Electrical Stimulation:    0     mins  12562 ( );    Timed Treatment:   39   mins   Total Treatment:     39   mins

## 2021-11-18 ENCOUNTER — TREATMENT (OUTPATIENT)
Dept: PHYSICAL THERAPY | Facility: CLINIC | Age: 86
End: 2021-11-18

## 2021-11-18 DIAGNOSIS — M48.061 SPINAL STENOSIS OF LUMBAR REGION WITHOUT NEUROGENIC CLAUDICATION: Primary | ICD-10-CM

## 2021-11-18 DIAGNOSIS — M47.816 LUMBAR FACET ARTHROPATHY: ICD-10-CM

## 2021-11-18 DIAGNOSIS — Z74.09 DECREASED FUNCTIONAL MOBILITY AND ENDURANCE: ICD-10-CM

## 2021-11-18 PROCEDURE — 97112 NEUROMUSCULAR REEDUCATION: CPT | Performed by: PHYSICAL THERAPIST

## 2021-11-18 PROCEDURE — 97530 THERAPEUTIC ACTIVITIES: CPT | Performed by: PHYSICAL THERAPIST

## 2021-11-18 PROCEDURE — 97110 THERAPEUTIC EXERCISES: CPT | Performed by: PHYSICAL THERAPIST

## 2021-11-18 NOTE — PROGRESS NOTES
Physical Therapy Daily Progress Note    Patient: Nyasia Dalal   : 1933  Diagnosis/ICD-10 Code:  Spinal stenosis of lumbar region without neurogenic claudication [M48.061]  Referring practitioner: EHSAN Lopez  Date of Initial Visit: Type: THERAPY  Noted: 2021  Today's Date: 2021  Patient seen for 25 sessions         Nyasia Dalal reports: legs are stiff as usual    Subjective     Objective   See Exercise, Manual, and Modality Logs for complete treatment.       Assessment/Plan  Subjectively, pt reports no increase of pain or discomfort with interventions performed today. Performed well with bounce pass activity with big forward step. Continues to demonstrate improvement in exercise technique with visual cues and demonstration as well as verbal sequencing cues. Good balance with all activities, used gait belt for new activities. Continues to benefit from verbal/tactile cues to ensure proper form and technique for exercise performance.     Progress per Plan of Care           Manual Therapy:         mins  83070;  Therapeutic Exercise:    24     mins  09839;     Neuromuscular Ethan:    12    mins  71688;    Therapeutic Activity:     8     mins  27658;     Gait Training:           mins  46736;     Ultrasound:          mins  44564;    Electrical Stimulation:         mins  16620 ( );  Dry Needling          mins self-pay    Timed Treatment:   44   mins   Total Treatment:     44   mins    Ella Veliz PTA  Physical Therapist Assistant [id number]

## 2021-11-18 NOTE — PROGRESS NOTES
"Physical Therapy Daily Treatment Note      Patient: Nyasia Dalal   : 1933  Referring practitioner: EHSAN Lopez  Date of Initial Visit: Type: THERAPY  Noted: 2021  Today's Date: 10/6/21  Patient seen for 14 sessions         Nyasia Dalal reports: doing \"OK\"        Subjective     Objective   -presents to clinic with flat affect and noted decreased step/stride length and foot clearance from floor.   See Exercise, Manual, and Modality Logs for complete treatment.       Assessment/Plan      Bed mobility activities performed with lessened assistance than previously noted.  Continues to benefit from contact guard assistance as well as verbal/tactile cues with gait, balance and therapeutic exercise activities while in clinic for safety.  Responds positively with cues and demonstrative walking in regards to her own gait with subsequent improvements noted in increased step and stride length. Should continue to improve with PT intervention.            Progress per Plan of Care toward all goals           Timed:  Manual Therapy:         mins  06494;  Therapeutic Exercise:    20     mins  20157;     Neuromuscular Ethan:   12     mins  48154;    Therapeutic Activity:     10     mins  26669;     Gait Trainin      mins  99627;     Ultrasound:          mins  25156;      Untimed:  Electrical Stimulation:         mins  03414 ( );  Mechanical Traction:         mins  19129;     Timed Treatment:   47   mins   Total Treatment:    47    mins  Grady Johns PTA  Physical Therapist  Assistant  [id number]  "

## 2021-11-22 ENCOUNTER — TREATMENT (OUTPATIENT)
Dept: PHYSICAL THERAPY | Facility: CLINIC | Age: 86
End: 2021-11-22

## 2021-11-22 DIAGNOSIS — Z74.09 DECREASED FUNCTIONAL MOBILITY AND ENDURANCE: ICD-10-CM

## 2021-11-22 DIAGNOSIS — M47.816 LUMBAR FACET ARTHROPATHY: ICD-10-CM

## 2021-11-22 DIAGNOSIS — M48.061 SPINAL STENOSIS OF LUMBAR REGION WITHOUT NEUROGENIC CLAUDICATION: Primary | ICD-10-CM

## 2021-11-22 PROCEDURE — 97110 THERAPEUTIC EXERCISES: CPT | Performed by: PHYSICAL THERAPIST

## 2021-11-22 PROCEDURE — 97530 THERAPEUTIC ACTIVITIES: CPT | Performed by: PHYSICAL THERAPIST

## 2021-11-22 PROCEDURE — 97112 NEUROMUSCULAR REEDUCATION: CPT | Performed by: PHYSICAL THERAPIST

## 2021-11-22 NOTE — PROGRESS NOTES
Physical Therapy Daily Progress Note    Visit #26    Subjective     Nyasia Mulugeta reports: feeling good      Objective   See Exercise, Manual, and Modality Logs for complete treatment.       Assessment/Plan  Endurance is significantly improved, pt is able to complete 40 minutes of activity in clinic without extended rest break or loss of balance.   Progress per Plan of Care           Manual Therapy:    0     mins  16163;  Therapeutic Exercise:    12     mins  59547;     Neuromuscular Ethan:    12    mins  25165;    Therapeutic Activity:     20     mins  23136;     Gait Trainin     mins  72148;     Ultrasound:     0     mins  11165;    Electrical Stimulation:    0     mins  21628 ( );    Timed Treatment:   44   mins   Total Treatment:     44   mins    Karol Edge PT, DPT  Physical Therapist  [place] License #[id number]

## 2021-11-24 ENCOUNTER — TREATMENT (OUTPATIENT)
Dept: PHYSICAL THERAPY | Facility: CLINIC | Age: 86
End: 2021-11-24

## 2021-11-24 DIAGNOSIS — M48.061 SPINAL STENOSIS OF LUMBAR REGION WITHOUT NEUROGENIC CLAUDICATION: Primary | ICD-10-CM

## 2021-11-24 DIAGNOSIS — M47.816 LUMBAR FACET ARTHROPATHY: ICD-10-CM

## 2021-11-24 DIAGNOSIS — Z74.09 DECREASED FUNCTIONAL MOBILITY AND ENDURANCE: ICD-10-CM

## 2021-11-24 PROCEDURE — 97530 THERAPEUTIC ACTIVITIES: CPT | Performed by: PHYSICAL THERAPIST

## 2021-11-24 PROCEDURE — 97112 NEUROMUSCULAR REEDUCATION: CPT | Performed by: PHYSICAL THERAPIST

## 2021-11-24 PROCEDURE — 97110 THERAPEUTIC EXERCISES: CPT | Performed by: PHYSICAL THERAPIST

## 2021-11-24 NOTE — PROGRESS NOTES
Physical Therapy Daily Progress Note    Visit #27    Subjective     Nyasia Dalal reports: feeling good today      Objective   See Exercise, Manual, and Modality Logs for complete treatment.       Assessment/Plan  Endurance continues to improve. Likely ready to D/C to HEP next visit.   Progress per Plan of Care           Manual Therapy:    0     mins  08218;  Therapeutic Exercise:    12     mins  00999;     Neuromuscular Ethan:    12    mins  21071;    Therapeutic Activity:     20     mins  51483;     Gait Trainin     mins  10995;     Ultrasound:     0     mins  48138;    Electrical Stimulation:    0     mins  98811 ( );    Timed Treatment:   44   mins   Total Treatment:     44   mins    Karol Edge PT, DPT  Physical Therapist  [place] License #[id number]

## 2021-11-29 ENCOUNTER — TREATMENT (OUTPATIENT)
Dept: PHYSICAL THERAPY | Facility: CLINIC | Age: 86
End: 2021-11-29

## 2021-11-29 DIAGNOSIS — M47.816 LUMBAR FACET ARTHROPATHY: ICD-10-CM

## 2021-11-29 DIAGNOSIS — Z74.09 DECREASED FUNCTIONAL MOBILITY AND ENDURANCE: ICD-10-CM

## 2021-11-29 DIAGNOSIS — M48.061 SPINAL STENOSIS OF LUMBAR REGION WITHOUT NEUROGENIC CLAUDICATION: Primary | ICD-10-CM

## 2021-11-29 PROCEDURE — 97110 THERAPEUTIC EXERCISES: CPT | Performed by: PHYSICAL THERAPIST

## 2021-11-29 PROCEDURE — 97112 NEUROMUSCULAR REEDUCATION: CPT | Performed by: PHYSICAL THERAPIST

## 2021-11-29 PROCEDURE — 97530 THERAPEUTIC ACTIVITIES: CPT | Performed by: PHYSICAL THERAPIST

## 2021-12-01 NOTE — PROGRESS NOTES
Physical Therapy Daily Progress Note    Visit #29    Subjective     Nyasia Dalal reports: doing well with walking at home. Feels stronger.       Objective   See Exercise, Manual, and Modality Logs for complete treatment.       Assessment/Plan  Pt has met most goals, reached max improvement with PT at this time. Issued maintenance HEP. Will hold chart open 30 days in case of acute exacerbation, otherwise D/C.             Manual Therapy:    0     mins  26291;  Therapeutic Exercise:    12     mins  20010;     Neuromuscular Ethan:    14    mins  39244;    Therapeutic Activity:     20     mins  54424;     Gait Trainin     mins  75774;     Ultrasound:     0     mins  22357;    Electrical Stimulation:    0     mins  46251 ( );    Timed Treatment:   46   mins   Total Treatment:     46   mins    Karol Edge, PT, DPT  Physical Therapist  [place] License #[id number]

## 2022-04-08 ENCOUNTER — LAB (OUTPATIENT)
Dept: LAB | Facility: HOSPITAL | Age: 87
End: 2022-04-08

## 2022-04-08 ENCOUNTER — TRANSCRIBE ORDERS (OUTPATIENT)
Dept: ADMINISTRATIVE | Facility: HOSPITAL | Age: 87
End: 2022-04-08

## 2022-04-08 DIAGNOSIS — Z00.00 ROUTINE GENERAL MEDICAL EXAMINATION AT A HEALTH CARE FACILITY: Primary | ICD-10-CM

## 2022-04-08 DIAGNOSIS — Z00.00 ROUTINE GENERAL MEDICAL EXAMINATION AT A HEALTH CARE FACILITY: ICD-10-CM

## 2022-04-08 LAB
ALBUMIN SERPL-MCNC: 4.6 G/DL (ref 3.5–5.2)
ALBUMIN/GLOB SERPL: 1.8 G/DL
ALP SERPL-CCNC: 74 U/L (ref 39–117)
ALT SERPL W P-5'-P-CCNC: 15 U/L (ref 1–33)
ANION GAP SERPL CALCULATED.3IONS-SCNC: 10 MMOL/L (ref 5–15)
AST SERPL-CCNC: 17 U/L (ref 1–32)
BASOPHILS # BLD AUTO: 0.04 10*3/MM3 (ref 0–0.2)
BASOPHILS NFR BLD AUTO: 0.6 % (ref 0–1.5)
BILIRUB SERPL-MCNC: 0.4 MG/DL (ref 0–1.2)
BUN SERPL-MCNC: 19 MG/DL (ref 8–23)
BUN/CREAT SERPL: 24.7 (ref 7–25)
CALCIUM SPEC-SCNC: 9.6 MG/DL (ref 8.6–10.5)
CHLORIDE SERPL-SCNC: 102 MMOL/L (ref 98–107)
CHOLEST SERPL-MCNC: 170 MG/DL (ref 0–200)
CO2 SERPL-SCNC: 28 MMOL/L (ref 22–29)
CREAT SERPL-MCNC: 0.77 MG/DL (ref 0.57–1)
DEPRECATED RDW RBC AUTO: 42 FL (ref 37–54)
EGFRCR SERPLBLD CKD-EPI 2021: 74.3 ML/MIN/1.73
EOSINOPHIL # BLD AUTO: 0.11 10*3/MM3 (ref 0–0.4)
EOSINOPHIL NFR BLD AUTO: 1.5 % (ref 0.3–6.2)
ERYTHROCYTE [DISTWIDTH] IN BLOOD BY AUTOMATED COUNT: 12 % (ref 12.3–15.4)
GLOBULIN UR ELPH-MCNC: 2.5 GM/DL
GLUCOSE SERPL-MCNC: 117 MG/DL (ref 65–99)
HCT VFR BLD AUTO: 40.7 % (ref 34–46.6)
HDLC SERPL-MCNC: 45 MG/DL (ref 40–60)
HGB BLD-MCNC: 13.3 G/DL (ref 12–15.9)
IMM GRANULOCYTES # BLD AUTO: 0.02 10*3/MM3 (ref 0–0.05)
IMM GRANULOCYTES NFR BLD AUTO: 0.3 % (ref 0–0.5)
LDLC SERPL CALC-MCNC: 112 MG/DL (ref 0–100)
LDLC/HDLC SERPL: 2.47 {RATIO}
LYMPHOCYTES # BLD AUTO: 2.17 10*3/MM3 (ref 0.7–3.1)
LYMPHOCYTES NFR BLD AUTO: 30.1 % (ref 19.6–45.3)
MCH RBC QN AUTO: 31.2 PG (ref 26.6–33)
MCHC RBC AUTO-ENTMCNC: 32.7 G/DL (ref 31.5–35.7)
MCV RBC AUTO: 95.5 FL (ref 79–97)
MONOCYTES # BLD AUTO: 0.62 10*3/MM3 (ref 0.1–0.9)
MONOCYTES NFR BLD AUTO: 8.6 % (ref 5–12)
NEUTROPHILS NFR BLD AUTO: 4.26 10*3/MM3 (ref 1.7–7)
NEUTROPHILS NFR BLD AUTO: 58.9 % (ref 42.7–76)
NRBC BLD AUTO-RTO: 0 /100 WBC (ref 0–0.2)
PLATELET # BLD AUTO: 304 10*3/MM3 (ref 140–450)
PMV BLD AUTO: 11.2 FL (ref 6–12)
POTASSIUM SERPL-SCNC: 4 MMOL/L (ref 3.5–5.2)
PROT SERPL-MCNC: 7.1 G/DL (ref 6–8.5)
RBC # BLD AUTO: 4.26 10*6/MM3 (ref 3.77–5.28)
SODIUM SERPL-SCNC: 140 MMOL/L (ref 136–145)
TRIGL SERPL-MCNC: 69 MG/DL (ref 0–150)
VLDLC SERPL-MCNC: 13 MG/DL (ref 5–40)
WBC NRBC COR # BLD: 7.22 10*3/MM3 (ref 3.4–10.8)

## 2022-04-08 PROCEDURE — 80061 LIPID PANEL: CPT

## 2022-04-08 PROCEDURE — 85025 COMPLETE CBC W/AUTO DIFF WBC: CPT

## 2022-04-08 PROCEDURE — 80053 COMPREHEN METABOLIC PANEL: CPT

## 2022-04-08 PROCEDURE — 36415 COLL VENOUS BLD VENIPUNCTURE: CPT

## 2022-04-12 ENCOUNTER — TRANSCRIBE ORDERS (OUTPATIENT)
Dept: ADMINISTRATIVE | Facility: HOSPITAL | Age: 87
End: 2022-04-12

## 2022-04-12 ENCOUNTER — LAB (OUTPATIENT)
Dept: LAB | Facility: HOSPITAL | Age: 87
End: 2022-04-12

## 2022-04-12 DIAGNOSIS — D51.9 ANEMIA DUE TO VITAMIN B12 DEFICIENCY, UNSPECIFIED B12 DEFICIENCY TYPE: ICD-10-CM

## 2022-04-12 DIAGNOSIS — E03.9 HYPOTHYROIDISM, UNSPECIFIED TYPE: ICD-10-CM

## 2022-04-12 DIAGNOSIS — E03.9 HYPOTHYROIDISM, UNSPECIFIED TYPE: Primary | ICD-10-CM

## 2022-04-12 LAB
FOLATE SERPL-MCNC: >20 NG/ML (ref 4.78–24.2)
TSH SERPL DL<=0.05 MIU/L-ACNC: 0.65 UIU/ML (ref 0.27–4.2)
VIT B12 BLD-MCNC: 497 PG/ML (ref 211–946)

## 2022-04-12 PROCEDURE — 82607 VITAMIN B-12: CPT

## 2022-04-12 PROCEDURE — 82746 ASSAY OF FOLIC ACID SERUM: CPT

## 2022-04-12 PROCEDURE — 36415 COLL VENOUS BLD VENIPUNCTURE: CPT

## 2022-04-12 PROCEDURE — 84443 ASSAY THYROID STIM HORMONE: CPT

## 2022-05-16 ENCOUNTER — APPOINTMENT (OUTPATIENT)
Dept: CT IMAGING | Facility: HOSPITAL | Age: 87
End: 2022-05-16

## 2022-05-16 ENCOUNTER — HOSPITAL ENCOUNTER (EMERGENCY)
Facility: HOSPITAL | Age: 87
Discharge: HOME | End: 2022-05-16
Attending: EMERGENCY MEDICINE | Admitting: EMERGENCY MEDICINE

## 2022-05-16 VITALS
DIASTOLIC BLOOD PRESSURE: 90 MMHG | SYSTOLIC BLOOD PRESSURE: 144 MMHG | BODY MASS INDEX: 24.29 KG/M2 | HEART RATE: 86 BPM | OXYGEN SATURATION: 98 % | TEMPERATURE: 98.2 F | WEIGHT: 132 LBS | RESPIRATION RATE: 18 BRPM | HEIGHT: 62 IN
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
## 2022-05-16 DIAGNOSIS — S09.90XA MINOR HEAD INJURY, INITIAL ENCOUNTER: ICD-10-CM

## 2022-05-16 DIAGNOSIS — S00.83XA TRAUMATIC HEMATOMA OF FOREHEAD, INITIAL ENCOUNTER: Primary | ICD-10-CM

## 2022-05-16 DIAGNOSIS — S02.2XXA CLOSED FRACTURE OF NASAL BONE, INITIAL ENCOUNTER: ICD-10-CM

## 2022-05-16 PROCEDURE — 99282 EMERGENCY DEPT VISIT SF MDM: CPT

## 2022-05-16 PROCEDURE — 70450 CT HEAD/BRAIN W/O DYE: CPT

## 2022-05-16 NOTE — DISCHARGE INSTRUCTIONS
Home, rest, head injury precautions, apply ice to affected area, Tylenol as needed for pain, home medicine as prescribed, follow up with neurology for further evaluation and PCP for recheck. Return to care if symptoms worsen or with further concerns.

## 2022-05-16 NOTE — ED PROVIDER NOTES
EMERGENCY DEPARTMENT ENCOUNTER    Room Number:  02/02  Date of encounter:  5/16/2022  PCP: Guilherme Oglesby MD  Historian: Patient      HPI:  Chief Complaint: Head injury  A complete HPI/ROS/PMH/PSH/SH/FH are unobtainable due to: N/A    Context: Nyasia Dalal is a 88 y.o. female with past medical history of communication and balance issues who presents to the ED c/o fall.  Patient was out in the ER turning her rosebushes when she lost her balance and fell forward hitting her head and face.  Patient has a large abrasion and hematoma to the forehead and contusion to the nose.  Patient denies any loss of consciousness, headache, neck pain, chest pain, abdominal pain, or any other injuries.      PAST MEDICAL HISTORY  Active Ambulatory Problems     Diagnosis Date Noted   • Osteoporosis 08/30/2016   • Spinal stenosis of lumbar region without neurogenic claudication 02/26/2019   • Other chronic pain 11/02/2020   • Sacroiliac joint dysfunction of right side 11/02/2020   • Lumbar facet arthropathy 11/02/2020     Resolved Ambulatory Problems     Diagnosis Date Noted   • No Resolved Ambulatory Problems     Past Medical History:   Diagnosis Date   • Breast cancer (HCC) 04/21/2014   • Irritable bowel syndrome    • Skin cancer          PAST SURGICAL HISTORY  Past Surgical History:   Procedure Laterality Date   • BREAST LUMPECTOMY Left    • CATARACT EXTRACTION EXTRACAPSULAR W/ INTRAOCULAR LENS IMPLANTATION Bilateral    • COLONOSCOPY     • EYE SURGERY     • SKIN CANCER EXCISION  06/26/2012         FAMILY HISTORY  Family History   Problem Relation Age of Onset   • Kidney failure Father          SOCIAL HISTORY  Social History     Socioeconomic History   • Marital status:    • Number of children: 3   • Years of education: HS   Tobacco Use   • Smoking status: Never Smoker   • Smokeless tobacco: Never Used   Vaping Use   • Vaping Use: Never used   Substance and Sexual Activity   • Alcohol use: Not Currently   • Drug use: No    • Sexual activity: Defer         ALLERGIES  Latex        REVIEW OF SYSTEMS  Review of Systems     All systems reviewed and negative except for those discussed in HPI.       PHYSICAL EXAM    I have reviewed the triage vital signs and nursing notes.    ED Triage Vitals [05/16/22 1316]   Temp Heart Rate Resp BP SpO2   98.2 °F (36.8 °C) 97 18 153/79 94 %      Temp src Heart Rate Source Patient Position BP Location FiO2 (%)   -- -- -- -- --       Physical Exam  GENERAL: Alert and oriented x3, speech is slow, but not dysarthric not distressed  HENT: No nasal septal hematoma, no hemotympanum, no rhinorrhea, no dental injury or malocclusion, there is a large forehead hematoma and contusion with mild tenderness to the nose  EYES: no scleral icterus, PERRL, EOMI  CV: regular rhythm, regular rate  RESPIRATORY: normal effort  ABDOMEN: soft  MUSCULOSKELETAL: no deformity  NEURO: alert, moves all extremities, no focal neurodeficits, follows commands  SKIN: warm, dry, abrasion to the forehead        LAB RESULTS  No results found for this or any previous visit (from the past 24 hour(s)).    Ordered the above labs and independently reviewed the results.        RADIOLOGY  CT Head Without Contrast    Result Date: 5/16/2022  EMERGENCY NONCONTRAST HEAD CT 05/16/2022  CLINICAL HISTORY: Fall.  TECHNIQUE: Spiral CT images were obtained from the base of the skull to the vertex without intravenous contrast. The images were reformatted and submitted in 3 mm thick axial CT sections with brain algorithm and 2 mm thick axial CT sections with high-resolution bone algorithm and 2 mm thick sagittal and coronal reconstructions were performed and submitted in brain algorithm.  COMPARISON: CT head 03/13/2021.  FINDINGS: There are patchy and confluent areas of low-density tracking throughout the periventricular and subcortical white matter of the cerebral hemispheres consistent with moderate small vessel disease. The remainder of the brain parenchyma  is normal in attenuation. There is mild diffuse cerebral atrophy and the lateral and third ventricles are mildly prominent in size, felt to be due to central volume loss or atrophy. I see no mass effect. There is no midline shift. No extraaxial fluid collections are identified. There is no evidence of acute intracranial hemorrhage. No acute skull fracture is identified. The calvarium and skull base are normal in appearance. There is a large scalp hematoma over the anterior frontal bone from recent head trauma. The paranasal sinuses, mastoid air cells and middle ear cavities are clear. There is a fracture of the left nasal bone that is new when compared to prior head CT 03/13/2021 which is felt to most probably be an acute left nasal bone fracture. There is 1 mm medial deviation of the distal left nasal bone fracture fragment.      1. There is moderate small vessel disease in the cerebral white matter and diffuse cerebral atrophy. 2. There is a moderate-sized scalp hematoma over the anterior frontal bone from today's head trauma and there is an acute fracture of the left nasal bone with 1 mm inward-medial deviation of the distal left nasal bone fracture fragment. The remainder of the head CT is within normal limits with no acute skull fracture or intracranial hemorrhage identified.  The results were communicated Dr. Humberto Montague from the emergency room 05/16/2022 at 2:30 PM.  Radiation dose reduction techniques were utilized, including automated exposure control and exposure modulation based on body size.         I ordered the above noted radiological studies. Reviewed by me and discussed with radiologist.  See dictation for official radiology interpretation.      PROCEDURES    Procedures      MEDICATIONS GIVEN IN ER    Medications - No data to display      PROGRESS, DATA ANALYSIS, CONSULTS, AND MEDICAL DECISION MAKING    All labs have been independently reviewed by me.  All radiology studies have been reviewed by me and  discussed with radiologist dictating the report.   EKG's independently viewed and interpreted by me.  Discussion below represents my analysis of pertinent findings related to patient's condition, differential diagnosis, treatment plan and final disposition.    DDx: Includes but not limited to minor head injury, skull fracture, intracranial hemorrhage, concussion, facial fracture, nasal fracture, contusion, hematoma, abrasion    ED Course as of 05/16/22 1604   Mon May 16, 2022   1434 Discussed CT head with Dr. Hale, radiologist, patient has a left nasal bone fracture not present on prior imaging, no evidence of skull fracture or intracranial hemorrhage [DC]   1529 Per family patient is at her baseline neurologically. [MEIR]      ED Course User Index  [DC] Javon Montague MD  [MEIR] Helio Centeno PA       MDM: Patient's CT scan of the head shows no acute intracranial abnormality or skull fracture, patient does have a minor nasal bone fracture.  Patient has been given strict head injury precautions, return to ER precautions, and per family the patient is at her baseline.  Patient has been strongly encouraged to follow-up with her PCP for recheck as well as with neurology for further evaluation of her chronic communication and balance issues.    PPE: The patient wore a surgical mask throughout the entire patient encounter. I wore an N95.    AS OF 16:04 EDT VITALS:    BP - 169/96  HR - 95  TEMP - 98.2 °F (36.8 °C)  O2 SATS - 94%        DIAGNOSIS  Final diagnoses:   Traumatic hematoma of forehead, initial encounter   Closed fracture of nasal bone, initial encounter   Minor head injury, initial encounter   Fall in home, initial encounter         DISPOSITION  DISCHARGE    Patient discharged in stable condition.    Reviewed implications of results, diagnosis, meds, responsibility to follow up, warning signs and symptoms of possible worsening, potential complications and reasons to return to ER.    Patient/Family voiced  understanding of above instructions.    Discussed plan for discharge, as there is no emergent indication for admission. Patient referred to primary care provider for BP management due to today's BP. Pt/family is agreeable and understands need for follow up and repeat testing.  Pt is aware that discharge does not mean that nothing is wrong but it indicates no emergency is present that requires admission and they must continue care with follow-up as given below or physician of their choice.     FOLLOW-UP  Guilherme Oglesby MD  [address] [facility] [address]  [place] [id number] [phone]    Schedule an appointment as soon as possible for a visit       [facility]  [address]  [facility] [address] [phone]  Schedule an appointment as soon as possible for a visit            Medication List      No changes were made to your prescriptions during this visit.                  Helio Centeno, PA  05/16/22 1603

## 2022-05-16 NOTE — ED PROVIDER NOTES
Pt presents to the ED c/o  a fall earlier today and lost her balance and fell forward hitting her head and face.  Has a large abrasion over her forehead and contusion to the nose.  Denies any specific nausea, vomiting, altered mental status, family reports that she is at baseline.     On exam,   General: No acute distress, nontoxic  HEENT: Mucous membranes moist, large central forehead abrasion without laceration, EOMI  Neck: Full ROM, supple, nontender  Pulm: Symmetric chest rise, nonlabored, lungs CTAB  Cardiovascular: Regular rate and rhythm, intact distal pulses  GI: Soft, nontender, nondistended, no rebound, no guarding, bowel sounds present  MSK: Full ROM, no deformity  Skin: Warm, dry  Neuro: Awake, alert, GCS 15, moving all extremities, no focal deficits  Psych: Calm, cooperative      N95, protective eye goggles, and gloves used during this encounter. Patient in surgical mask.      Plan:   ED Course as of 05/16/22 2124   Mon May 16, 2022   1434 Discussed CT head with Dr. Hale, radiologist, patient has a left nasal bone fracture not present on prior imaging, no evidence of skull fracture or intracranial hemorrhage [DC]   1529 Per family patient is at her baseline neurologically. [MEIR]      ED Course User Index  [DC] Javon Montague MD  [MEIR] Helio Centeno PA     CT scan with no evidence of skull fracture or intracranial hemorrhage, does have a nasal bone fracture but overall is well-appearing in no acute distress.  Safe for discharge with close outpatient follow-up, ED return for worsening symptoms as needed.     Attestation:  The LUIS ALBERTO and I have discussed this patient's history, physical exam, and treatment plan.  I have reviewed the documentation and personally had a face to face interaction with the patient. I affirm the documentation and agree with the treatment and plan.  The attached note describes my personal findings.          Javon Montague MD  05/16/22 2125

## 2022-05-16 NOTE — ED TRIAGE NOTES
.Pt masked on arrival, staff masked    Pt from home w/family,  found pt outside after an unwitnessed fall, unknown if loc, hit head on concrete, abrasion noted to forehead, also bleeding from nose, abrasions to arms, acting baseline per family

## 2022-09-12 ENCOUNTER — APPOINTMENT (OUTPATIENT)
Dept: GENERAL RADIOLOGY | Facility: HOSPITAL | Age: 87
End: 2022-09-12

## 2022-09-12 ENCOUNTER — HOSPITAL ENCOUNTER (INPATIENT)
Facility: HOSPITAL | Age: 87
LOS: 6 days | Discharge: NURSING, REHAB OR LONG-TERM CARE | End: 2022-09-18
Attending: EMERGENCY MEDICINE | Admitting: INTERNAL MEDICINE

## 2022-09-12 ENCOUNTER — APPOINTMENT (OUTPATIENT)
Dept: CT IMAGING | Facility: HOSPITAL | Age: 87
End: 2022-09-12

## 2022-09-12 DIAGNOSIS — S72.002A CLOSED DISPLACED FRACTURE OF LEFT FEMORAL NECK: Primary | ICD-10-CM
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
## 2022-09-12 LAB
ALBUMIN SERPL-MCNC: 4.7 G/DL (ref 3.5–5.2)
ALBUMIN/GLOB SERPL: 2 G/DL
ALP SERPL-CCNC: 108 U/L (ref 39–117)
ALT SERPL W P-5'-P-CCNC: 16 U/L (ref 1–33)
ANION GAP SERPL CALCULATED.3IONS-SCNC: 14 MMOL/L (ref 5–15)
AST SERPL-CCNC: 26 U/L (ref 1–32)
BASOPHILS # BLD AUTO: 0.02 10*3/MM3 (ref 0–0.2)
BASOPHILS NFR BLD AUTO: 0.1 % (ref 0–1.5)
BILIRUB SERPL-MCNC: 1 MG/DL (ref 0–1.2)
BUN SERPL-MCNC: 20 MG/DL (ref 8–23)
BUN/CREAT SERPL: 31.3 (ref 7–25)
CALCIUM SPEC-SCNC: 9.1 MG/DL (ref 8.6–10.5)
CHLORIDE SERPL-SCNC: 100 MMOL/L (ref 98–107)
CO2 SERPL-SCNC: 24 MMOL/L (ref 22–29)
CREAT SERPL-MCNC: 0.64 MG/DL (ref 0.57–1)
DEPRECATED RDW RBC AUTO: 41.9 FL (ref 37–54)
EGFRCR SERPLBLD CKD-EPI 2021: 84.6 ML/MIN/1.73
EOSINOPHIL # BLD AUTO: 0 10*3/MM3 (ref 0–0.4)
EOSINOPHIL NFR BLD AUTO: 0 % (ref 0.3–6.2)
ERYTHROCYTE [DISTWIDTH] IN BLOOD BY AUTOMATED COUNT: 12.2 % (ref 12.3–15.4)
GLOBULIN UR ELPH-MCNC: 2.3 GM/DL
GLUCOSE SERPL-MCNC: 151 MG/DL (ref 65–99)
HCT VFR BLD AUTO: 36.9 % (ref 34–46.6)
HGB BLD-MCNC: 12.4 G/DL (ref 12–15.9)
IMM GRANULOCYTES # BLD AUTO: 0.13 10*3/MM3 (ref 0–0.05)
IMM GRANULOCYTES NFR BLD AUTO: 0.7 % (ref 0–0.5)
LYMPHOCYTES # BLD AUTO: 0.45 10*3/MM3 (ref 0.7–3.1)
LYMPHOCYTES NFR BLD AUTO: 2.6 % (ref 19.6–45.3)
MCH RBC QN AUTO: 31.5 PG (ref 26.6–33)
MCHC RBC AUTO-ENTMCNC: 33.6 G/DL (ref 31.5–35.7)
MCV RBC AUTO: 93.7 FL (ref 79–97)
MONOCYTES # BLD AUTO: 0.58 10*3/MM3 (ref 0.1–0.9)
MONOCYTES NFR BLD AUTO: 3.3 % (ref 5–12)
NEUTROPHILS NFR BLD AUTO: 16.18 10*3/MM3 (ref 1.7–7)
NEUTROPHILS NFR BLD AUTO: 93.3 % (ref 42.7–76)
NRBC BLD AUTO-RTO: 0 /100 WBC (ref 0–0.2)
PLATELET # BLD AUTO: 274 10*3/MM3 (ref 140–450)
PMV BLD AUTO: 10 FL (ref 6–12)
POTASSIUM SERPL-SCNC: 4.1 MMOL/L (ref 3.5–5.2)
PROT SERPL-MCNC: 7 G/DL (ref 6–8.5)
QT INTERVAL: 375 MS
RBC # BLD AUTO: 3.94 10*6/MM3 (ref 3.77–5.28)
SODIUM SERPL-SCNC: 138 MMOL/L (ref 136–145)
WBC NRBC COR # BLD: 17.36 10*3/MM3 (ref 3.4–10.8)

## 2022-09-12 PROCEDURE — 73502 X-RAY EXAM HIP UNI 2-3 VIEWS: CPT

## 2022-09-12 PROCEDURE — 93005 ELECTROCARDIOGRAM TRACING: CPT | Performed by: PHYSICIAN ASSISTANT

## 2022-09-12 PROCEDURE — 99284 EMERGENCY DEPT VISIT MOD MDM: CPT

## 2022-09-12 PROCEDURE — 25010000002 HYDROMORPHONE PER 4 MG: Performed by: INTERNAL MEDICINE

## 2022-09-12 PROCEDURE — 80053 COMPREHEN METABOLIC PANEL: CPT | Performed by: PHYSICIAN ASSISTANT

## 2022-09-12 PROCEDURE — 85025 COMPLETE CBC W/AUTO DIFF WBC: CPT | Performed by: PHYSICIAN ASSISTANT

## 2022-09-12 PROCEDURE — 70450 CT HEAD/BRAIN W/O DYE: CPT

## 2022-09-12 PROCEDURE — 25010000002 MORPHINE PER 10 MG: Performed by: EMERGENCY MEDICINE

## 2022-09-12 PROCEDURE — 71045 X-RAY EXAM CHEST 1 VIEW: CPT

## 2022-09-12 PROCEDURE — 93010 ELECTROCARDIOGRAM REPORT: CPT | Performed by: INTERNAL MEDICINE

## 2022-09-12 RX ORDER — SODIUM CHLORIDE 9 MG/ML
75 INJECTION, SOLUTION INTRAVENOUS CONTINUOUS
Status: DISCONTINUED | OUTPATIENT
Start: 2022-09-12 | End: 2022-09-18 | Stop reason: HOSPADM

## 2022-09-12 RX ORDER — UREA 10 %
3 LOTION (ML) TOPICAL NIGHTLY PRN
Status: DISCONTINUED | OUTPATIENT
Start: 2022-09-12 | End: 2022-09-18 | Stop reason: HOSPADM

## 2022-09-12 RX ORDER — ONDANSETRON 4 MG/1
4 TABLET, FILM COATED ORAL EVERY 6 HOURS PRN
Status: DISCONTINUED | OUTPATIENT
Start: 2022-09-12 | End: 2022-09-18 | Stop reason: HOSPADM

## 2022-09-12 RX ORDER — NALOXONE HCL 0.4 MG/ML
0.4 VIAL (ML) INJECTION
Status: DISCONTINUED | OUTPATIENT
Start: 2022-09-12 | End: 2022-09-18 | Stop reason: HOSPADM

## 2022-09-12 RX ORDER — ACETAMINOPHEN 325 MG/1
650 TABLET ORAL EVERY 4 HOURS PRN
Status: DISCONTINUED | OUTPATIENT
Start: 2022-09-12 | End: 2022-09-18 | Stop reason: HOSPADM

## 2022-09-12 RX ORDER — ONDANSETRON 2 MG/ML
4 INJECTION INTRAMUSCULAR; INTRAVENOUS EVERY 6 HOURS PRN
Status: DISCONTINUED | OUTPATIENT
Start: 2022-09-12 | End: 2022-09-18 | Stop reason: HOSPADM

## 2022-09-12 RX ORDER — HYDROCODONE BITARTRATE AND ACETAMINOPHEN 5; 325 MG/1; MG/1
1 TABLET ORAL EVERY 4 HOURS PRN
Status: DISCONTINUED | OUTPATIENT
Start: 2022-09-12 | End: 2022-09-18 | Stop reason: HOSPADM

## 2022-09-12 RX ORDER — CEFAZOLIN SODIUM 2 G/100ML
2 INJECTION, SOLUTION INTRAVENOUS
Status: COMPLETED | OUTPATIENT
Start: 2022-09-13 | End: 2022-09-13

## 2022-09-12 RX ORDER — HYDROMORPHONE HYDROCHLORIDE 1 MG/ML
0.5 INJECTION, SOLUTION INTRAMUSCULAR; INTRAVENOUS; SUBCUTANEOUS
Status: DISCONTINUED | OUTPATIENT
Start: 2022-09-12 | End: 2022-09-18 | Stop reason: HOSPADM

## 2022-09-12 RX ORDER — MORPHINE SULFATE 2 MG/ML
4 INJECTION, SOLUTION INTRAMUSCULAR; INTRAVENOUS ONCE
Status: COMPLETED | OUTPATIENT
Start: 2022-09-12 | End: 2022-09-12

## 2022-09-12 RX ADMIN — HYDROMORPHONE HYDROCHLORIDE 0.5 MG: 1 INJECTION, SOLUTION INTRAMUSCULAR; INTRAVENOUS; SUBCUTANEOUS at 21:54

## 2022-09-12 RX ADMIN — SODIUM CHLORIDE 75 ML/HR: 9 INJECTION, SOLUTION INTRAVENOUS at 18:05

## 2022-09-12 RX ADMIN — MORPHINE SULFATE 4 MG: 2 INJECTION, SOLUTION INTRAMUSCULAR; INTRAVENOUS at 16:42

## 2022-09-12 NOTE — H&P
HISTORY AND PHYSICAL   [facility]        Date of Admission: 2022  Patient Identification:  Name: Nyasia Dalal  Age: 89 y.o.  Sex: female  :  1933  MRN: [id number]                     Primary Care Physician: Guilherme Oglesby MD    Chief Complaint:  89 year old female who recently moved to an assisted living facility; she came in after a fall; she was found on the floor by staff; she is unsure what happened; she has had several recent falls according to family; she is not able to answer questions well but this is not new according to the daughter who is present    History of Present Illness:   As above    Past Medical History:  Past Medical History:   Diagnosis Date   • Breast cancer (HCC) 2014   • Irritable bowel syndrome    • Osteoporosis    • Skin cancer      Past Surgical History:  Past Surgical History:   Procedure Laterality Date   • BREAST LUMPECTOMY Left    • CATARACT EXTRACTION EXTRACAPSULAR W/ INTRAOCULAR LENS IMPLANTATION Bilateral    • COLONOSCOPY     • EYE SURGERY     • SKIN CANCER EXCISION  2012      Home Meds:  (Not in a hospital admission)      Allergies:  Allergies   Allergen Reactions   • Latex Rash     Immunizations:  Immunization History   Administered Date(s) Administered   • COVID-19 (PFIZER) PURPLE CAP 02/10/2021, 2021     Social History:   Social History     Social History Narrative   • Not on file     Social History     Socioeconomic History   • Marital status:    • Number of children: 3   • Years of education: HS   Tobacco Use   • Smoking status: Never Smoker   • Smokeless tobacco: Never Used   Vaping Use   • Vaping Use: Never used   Substance and Sexual Activity   • Alcohol use: Not Currently   • Drug use: No   • Sexual activity: Defer       Family History:  Family History   Problem Relation Age of Onset   • Kidney failure Father         Review of Systems  Not obtainable from the patient  Objective:  T Max 24 hrs: Temp (24hrs), Av.2  °F (36.8 °C), Min:97.3 °F (36.3 °C), Max:99 °F (37.2 °C)    Vitals Ranges:   Temp:  [97.3 °F (36.3 °C)-99 °F (37.2 °C)] 99 °F (37.2 °C)  Heart Rate:  [] 92  Resp:  [16-20] 20  BP: (159-182)/(85-92) 159/86      Exam:  /86 (BP Location: Right arm, Patient Position: Lying)   Pulse 92   Temp 99 °F (37.2 °C)   Resp 20   SpO2 96%     General Appearance:    Alert, cooperative, no distress, appears stated age; chronically ill appearing   Head:    Normocephalic, without obvious abnormality, atraumatic   Eyes:    PERRL, conjunctivae/corneas clear, EOM's intact, both eyes   Ears:    Normal external ear canals, both ears   Nose:   Nares normal, septum midline, mucosa normal, no drainage    or sinus tenderness   Throat:   Lips, mucosa, and tongue normal   Neck:   Supple, symmetrical, trachea midline, no adenopathy;     thyroid:  no enlargement/tenderness/nodules; no carotid    bruit or JVD   Back:     Symmetric, no curvature, ROM normal, no CVA tenderness   Lungs:     Decreased breath sounds bilaterally, respirations unlabored   Chest Wall:    No tenderness or deformity    Heart:    Regular rate and rhythm, S1 and S2 normal, no murmur, rub   or gallop   Abdomen:     Soft, nontender, bowel sounds active all four quadrants,     no masses, no hepatomegaly, no splenomegaly   Extremities:   Extremities normal, atraumatic, no cyanosis or edema   Pulses:   2+ and symmetric all extremities   Skin:   Skin color, texture, turgor normal, no rashes or lesions               .    Data Review:  Labs in chart were reviewed.  WBC   Date Value Ref Range Status   09/12/2022 17.36 (H) 3.40 - 10.80 10*3/mm3 Final     Hemoglobin   Date Value Ref Range Status   09/12/2022 12.4 12.0 - 15.9 g/dL Final     Hematocrit   Date Value Ref Range Status   09/12/2022 36.9 34.0 - 46.6 % Final     Platelets   Date Value Ref Range Status   09/12/2022 274 140 - 450 10*3/mm3 Final     Sodium   Date Value Ref Range Status   09/12/2022 138 136 - 145  mmol/L Final     Potassium   Date Value Ref Range Status   09/12/2022 4.1 3.5 - 5.2 mmol/L Final     Chloride   Date Value Ref Range Status   09/12/2022 100 98 - 107 mmol/L Final     CO2   Date Value Ref Range Status   09/12/2022 24.0 22.0 - 29.0 mmol/L Final     BUN   Date Value Ref Range Status   09/12/2022 20 8 - 23 mg/dL Final     Creatinine   Date Value Ref Range Status   09/12/2022 0.64 0.57 - 1.00 mg/dL Final     Glucose   Date Value Ref Range Status   09/12/2022 151 (H) 65 - 99 mg/dL Final     Calcium   Date Value Ref Range Status   09/12/2022 9.1 8.6 - 10.5 mg/dL Final     AST (SGOT)   Date Value Ref Range Status   09/12/2022 26 1 - 32 U/L Final     ALT (SGPT)   Date Value Ref Range Status   09/12/2022 16 1 - 33 U/L Final     Alkaline Phosphatase   Date Value Ref Range Status   09/12/2022 108 39 - 117 U/L Final                Imaging Results (All)     Procedure Component Value Units Date/Time    CT Head Without Contrast [[id number]] Collected: 09/12/22 1655     Updated: 09/12/22 1904    Narrative:      CT SCAN OF THE BRAIN WITHOUT CONTRAST     HISTORY: Recent fall. Possible head trauma. Confusion.     The CT scan was performed as an emergency procedure through the brain  without contrast. There is prominent diffuse atrophy and chronic small  vessel ischemic change similar to the study of 05/16/2022. There is no  evidence of acute intracranial hemorrhage or mass effect. The visualized  sinuses and mastoid air cells are clear.                 Radiation dose reduction techniques were utilized, including automated  exposure control and exposure modulation based on body size.     This report was finalized on 9/12/2022 7:01 PM by Dr. Syed Guzman M.D.       XR Hip With or Without Pelvis 2 - 3 View Left [[id number]] Collected: 09/12/22 1640     Updated: 09/12/22 1645    Narrative:      TWO-VIEW LEFT HIP AND ONE VIEW AP PELVIS     HISTORY: Fell. Hip pain.     FINDINGS: There is an acute fracture of the left  femoral neck with  superior displacement of the femur relative to the femoral head.     ONE VIEW SUPINE CHEST     HISTORY: Preop for hip surgery. Hip fracture. Breast cancer.     FINDINGS: There is mild cardiomegaly unchanged from 01/23/2014. The  lungs are clear with some minimal upper lobe vascular prominence. There  is no focal infiltrate.     This report was finalized on 9/12/2022 4:41 PM by Dr. Syed Guzman M.D.       XR Chest 1 View [[id number]] Collected: 09/12/22 1640     Updated: 09/12/22 1645    Narrative:      TWO-VIEW LEFT HIP AND ONE VIEW AP PELVIS     HISTORY: Fell. Hip pain.     FINDINGS: There is an acute fracture of the left femoral neck with  superior displacement of the femur relative to the femoral head.     ONE VIEW SUPINE CHEST     HISTORY: Preop for hip surgery. Hip fracture. Breast cancer.     FINDINGS: There is mild cardiomegaly unchanged from 01/23/2014. The  lungs are clear with some minimal upper lobe vascular prominence. There  is no focal infiltrate.     This report was finalized on 9/12/2022 4:41 PM by Dr. Syed Guzman M.D.               Assessment:  Active Hospital Problems    Diagnosis  POA   • Closed displaced fracture of left femoral neck (HCC) [S72.002A]  Yes      Resolved Hospital Problems   No resolved problems to display.   hypertension  Hyperglycemia  osteoporosis    Plan:  Ortho to see  Keep npo after midnight  Check ua  Trend wbc  Monitor bp and blood sugar  D.w patient, daughter and ED provider  Will likely need rehab once ready for dc after surgery    Smitha Lara MD  9/12/2022  19:40 EDT

## 2022-09-12 NOTE — ED TRIAGE NOTES
Patient to ER via car from assisted living. Family was called and told patient was found on the floor. Unsure of how she fell but denies hitting her head. Patient complains L hip pain    Patient wearing mask this RN in PPE

## 2022-09-12 NOTE — ED PROVIDER NOTES
EMERGENCY DEPARTMENT ENCOUNTER    Room Number: 01/01  Date Seen: 9/12/2022  Time Seen: 17:41 EDT  PCP: Guilherme Oglesby MD    Historian: Son, patient      HISTORY OF PRESENT ILLNESS    Chief Complaint: Fall, hip pain    Context: Nyasia Dalal is a 89 y.o. female with PMHx of osteoporosis who presents to the ED with c/o left hip pain after a suspected ground-level fall at her living facility.  Staff reportedly found the patient had fallen sometime around lunch.  Patient states she thinks she hit her head.  She does not take blood thinners.  Family denies previous hip surgery or injury.  Patient denies other complaints.        MEDICAL RECORD REVIEW    Reviewed in Epic       PAST MEDICAL HISTORY    Active Ambulatory Problems     Diagnosis Date Noted   • Osteoporosis 08/30/2016   • Spinal stenosis of lumbar region without neurogenic claudication 02/26/2019   • Other chronic pain 11/02/2020   • Sacroiliac joint dysfunction of right side 11/02/2020   • Lumbar facet arthropathy 11/02/2020     Resolved Ambulatory Problems     Diagnosis Date Noted   • No Resolved Ambulatory Problems     Past Medical History:   Diagnosis Date   • Breast cancer (HCC) 04/21/2014   • Irritable bowel syndrome    • Skin cancer          PAST SURGICAL HISTORY    Past Surgical History:   Procedure Laterality Date   • BREAST LUMPECTOMY Left    • CATARACT EXTRACTION EXTRACAPSULAR W/ INTRAOCULAR LENS IMPLANTATION Bilateral    • COLONOSCOPY     • EYE SURGERY     • SKIN CANCER EXCISION  06/26/2012         FAMILY HISTORY    Family History   Problem Relation Age of Onset   • Kidney failure Father          SOCIAL HISTORY    Social History     Socioeconomic History   • Marital status:    • Number of children: 3   • Years of education: HS   Tobacco Use   • Smoking status: Never Smoker   • Smokeless tobacco: Never Used   Vaping Use   • Vaping Use: Never used   Substance and Sexual Activity   • Alcohol use: Not Currently   • Drug use: No   • Sexual  activity: Defer         ALLERGIES    Latex      REVIEW OF SYSTEMS    Review of Systems   Constitutional: Negative for chills and fever.   Respiratory: Negative for shortness of breath.    Cardiovascular: Negative for chest pain.   Gastrointestinal: Negative for nausea and vomiting.   Genitourinary: Negative for dysuria.   Musculoskeletal: Positive for arthralgias (Left hip pain). Negative for neck pain.   Skin: Negative.    Neurological: Negative for dizziness and headaches.       All systems reviewed and negative except those discussed in HPI.      PHYSICAL EXAM    ED Triage Vitals   Temp Heart Rate Resp BP SpO2   09/12/22 1519 09/12/22 1519 09/12/22 1519 09/12/22 1519 09/12/22 1519   99 °F (37.2 °C) 105 18 (!) 182/92 90 %      Temp src Heart Rate Source Patient Position BP Location FiO2 (%)   -- 09/12/22 1601 09/12/22 1601 09/12/22 1601 --    Right;Radial Lying Right arm        I have reviewed the triage vital signs and nursing notes.    Constitutional: Well appearing, does not appear acutely distressed   Head: Atraumatic, normocephalic  Neck: No midline cervical tenderness, full painless range of motion  Eyes: No scleral icterus, no scleral injection  ENT: Nares patent, no raccoon eyes no simeon sign  CV: Mildly tachycardic rate, regular rhythm, distal pulses symmetric  Respiratory: No distress, CTAB  GI: Abdomen soft, nontender  Musculoskeletal: Shortened and externally rotated left lower extremity with left lateral hip tenderness to palpation, neurovascularly intact  Skin: Warm, dry, no rash  Neuro: A&Ox4, moves all extremities, follows commands, no focal deficits  Psych: Normal mood      DIFFERENTIAL DIAGNOSIS    Including but not limited to: Hip contusion, hip fracture    LAB RESULTS    Recent Results (from the past 24 hour(s))   Comprehensive Metabolic Panel    Collection Time: 09/12/22  4:46 PM    Specimen: Blood   Result Value Ref Range    Glucose 151 (H) 65 - 99 mg/dL    BUN 20 8 - 23 mg/dL    Creatinine  0.64 0.57 - 1.00 mg/dL    Sodium 138 136 - 145 mmol/L    Potassium 4.1 3.5 - 5.2 mmol/L    Chloride 100 98 - 107 mmol/L    CO2 24.0 22.0 - 29.0 mmol/L    Calcium 9.1 8.6 - 10.5 mg/dL    Total Protein 7.0 6.0 - 8.5 g/dL    Albumin 4.70 3.50 - 5.20 g/dL    ALT (SGPT) 16 1 - 33 U/L    AST (SGOT) 26 1 - 32 U/L    Alkaline Phosphatase 108 39 - 117 U/L    Total Bilirubin 1.0 0.0 - 1.2 mg/dL    Globulin 2.3 gm/dL    A/G Ratio 2.0 g/dL    BUN/Creatinine Ratio 31.3 (H) 7.0 - 25.0    Anion Gap 14.0 5.0 - 15.0 mmol/L    eGFR 84.6 >60.0 mL/min/1.73   CBC Auto Differential    Collection Time: 09/12/22  4:46 PM    Specimen: Blood   Result Value Ref Range    WBC 17.36 (H) 3.40 - 10.80 10*3/mm3    RBC 3.94 3.77 - 5.28 10*6/mm3    Hemoglobin 12.4 12.0 - 15.9 g/dL    Hematocrit 36.9 34.0 - 46.6 %    MCV 93.7 79.0 - 97.0 fL    MCH 31.5 26.6 - 33.0 pg    MCHC 33.6 31.5 - 35.7 g/dL    RDW 12.2 (L) 12.3 - 15.4 %    RDW-SD 41.9 37.0 - 54.0 fl    MPV 10.0 6.0 - 12.0 fL    Platelets 274 140 - 450 10*3/mm3    Neutrophil % 93.3 (H) 42.7 - 76.0 %    Lymphocyte % 2.6 (L) 19.6 - 45.3 %    Monocyte % 3.3 (L) 5.0 - 12.0 %    Eosinophil % 0.0 (L) 0.3 - 6.2 %    Basophil % 0.1 0.0 - 1.5 %    Immature Grans % 0.7 (H) 0.0 - 0.5 %    Neutrophils, Absolute 16.18 (H) 1.70 - 7.00 10*3/mm3    Lymphocytes, Absolute 0.45 (L) 0.70 - 3.10 10*3/mm3    Monocytes, Absolute 0.58 0.10 - 0.90 10*3/mm3    Eosinophils, Absolute 0.00 0.00 - 0.40 10*3/mm3    Basophils, Absolute 0.02 0.00 - 0.20 10*3/mm3    Immature Grans, Absolute 0.13 (H) 0.00 - 0.05 10*3/mm3    nRBC 0.0 0.0 - 0.2 /100 WBC   ECG 12 Lead    Collection Time: 09/12/22  5:12 PM   Result Value Ref Range    QT Interval 375 ms       I ordered the above labs and independently reviewed the results.    RADIOLOGY RESULTS    CT Head Without Contrast    Result Date: 9/12/2022  CT SCAN OF THE BRAIN WITHOUT CONTRAST  HISTORY: Recent fall. Possible head trauma. Confusion.  The CT scan was performed as an emergency  procedure through the brain without contrast. There is prominent diffuse atrophy and chronic small vessel ischemic change similar to the study of 05/16/2022. There is no evidence of acute intracranial hemorrhage or mass effect. The visualized sinuses and mastoid air cells are clear.      Radiation dose reduction techniques were utilized, including automated exposure control and exposure modulation based on body size.  This report was finalized on 9/12/2022 7:01 PM by Dr. Syed Guzman M.D.      XR Hip With or Without Pelvis 2 - 3 View Left, XR Chest 1 View    Result Date: 9/12/2022  TWO-VIEW LEFT HIP AND ONE VIEW AP PELVIS  HISTORY: Fell. Hip pain.  FINDINGS: There is an acute fracture of the left femoral neck with superior displacement of the femur relative to the femoral head.  ONE VIEW SUPINE CHEST  HISTORY: Preop for hip surgery. Hip fracture. Breast cancer.  FINDINGS: There is mild cardiomegaly unchanged from 01/23/2014. The lungs are clear with some minimal upper lobe vascular prominence. There is no focal infiltrate.  This report was finalized on 9/12/2022 4:41 PM by Dr. Syed Guzman M.D.        I ordered the above noted radiological studies and reviewed the images on the PACS system.     PROCEDURES    None    EKG    EKG: normal EKG, normal sinus rhythm, there are no previous tracings available for comparison.      MEDICATIONS GIVEN IN ER    Medications   sodium chloride 0.9 % infusion (75 mL/hr Intravenous New Bag 9/12/22 1805)   acetaminophen (TYLENOL) tablet 650 mg (has no administration in time range)   HYDROcodone-acetaminophen (NORCO) 5-325 MG per tablet 1 tablet (has no administration in time range)   HYDROmorphone (DILAUDID) injection 0.5 mg (has no administration in time range)     And   naloxone (NARCAN) injection 0.4 mg (has no administration in time range)   ondansetron (ZOFRAN) tablet 4 mg (has no administration in time range)     Or   ondansetron (ZOFRAN) injection 4 mg (has no administration  in time range)   melatonin tablet 3 mg (has no administration in time range)   ceFAZolin in dextrose (ANCEF) IVPB solution 2 g (has no administration in time range)   morphine injection 4 mg (4 mg Intravenous Given 9/12/22 1642)         PROGRESS, CONSULTS, and MEDICAL DECISION MAKING    ED Course as of 09/12/22 2019   Mon Sep 12, 2022   1725 Discussed case with Dr. Lara, hospitalist, who will admit the patient. [DC]   1729 Discussed case with Dr. Barba, orthopedics, who will consult. [DC]      ED Course User Index  [DC] Yolanda Su PA     Patient was ground-level fall at living facility presents with left hip pain.  Left lower extremity shortened and rotated on exam concerning for hip fracture which was confirmed by x-ray.  CT head, chest x-ray are negative for acute findings.  Discussed with Ortho who will consult and patient admitted to hospitalist service.      Discussed treatment plan and reason for admission with pt/family and admitting physician.  Pt/family voiced understanding of the plan for admission for further testing/treatment as needed.         DIAGNOSIS  Final diagnoses:   Closed displaced fracture of left femoral neck (HCC)   Fall from ground level       DISPOSITION  ED Disposition     ED Disposition   Decision to Admit    Condition   --    Comment   Level of Care: Med/Surg [1]   Diagnosis: Closed displaced fracture of left femoral neck (HCC) [[id number]]   Admitting Physician: ABHI LARA [[id number]]   Attending Physician: ABHI LARA [[id number]]   Certification: I Certify That Inpatient Hospital Services Are Medically Necessary For Greater Than 2 Midnights                   Patient was placed in face mask in first look. Patient was wearing facemask when I entered the room and throughout our encounter. I wore full protective equipment throughout this patient encounter including a face mask, and gloves. Hand hygiene was performed before donning protective equipment and after removal when  leaving the room.    Dictated utilizing Dragon dictation.      Note Disclaimer: At [facility], we believe that sharing information builds trust and better relationships. You are receiving this note because you recently visited [facility]. It is possible you will see health information before a provider has talked with you about it. This kind of information can be easy to misunderstand. To help you fully understand what it means for your health, we urge you to discuss this note with your provider.           Yolanda Su PA  09/12/22 2019

## 2022-09-13 ENCOUNTER — ANESTHESIA EVENT (OUTPATIENT)
Dept: PERIOP | Facility: HOSPITAL | Age: 87
End: 2022-09-13

## 2022-09-13 ENCOUNTER — ANESTHESIA (OUTPATIENT)
Dept: PERIOP | Facility: HOSPITAL | Age: 87
End: 2022-09-13

## 2022-09-13 ENCOUNTER — APPOINTMENT (OUTPATIENT)
Dept: GENERAL RADIOLOGY | Facility: HOSPITAL | Age: 87
End: 2022-09-13

## 2022-09-13 LAB
ANION GAP SERPL CALCULATED.3IONS-SCNC: 10 MMOL/L (ref 5–15)
BACTERIA UR QL AUTO: ABNORMAL /HPF
BILIRUB UR QL STRIP: NEGATIVE
BUN SERPL-MCNC: 26 MG/DL (ref 8–23)
BUN/CREAT SERPL: 38.2 (ref 7–25)
CALCIUM SPEC-SCNC: 8.3 MG/DL (ref 8.6–10.5)
CHLORIDE SERPL-SCNC: 106 MMOL/L (ref 98–107)
CLARITY UR: ABNORMAL
CO2 SERPL-SCNC: 24 MMOL/L (ref 22–29)
COLOR UR: YELLOW
CREAT SERPL-MCNC: 0.68 MG/DL (ref 0.57–1)
DEPRECATED RDW RBC AUTO: 44.1 FL (ref 37–54)
EGFRCR SERPLBLD CKD-EPI 2021: 83.4 ML/MIN/1.73
ERYTHROCYTE [DISTWIDTH] IN BLOOD BY AUTOMATED COUNT: 12.5 % (ref 12.3–15.4)
GLUCOSE SERPL-MCNC: 123 MG/DL (ref 65–99)
GLUCOSE UR STRIP-MCNC: NEGATIVE MG/DL
HCT VFR BLD AUTO: 33.6 % (ref 34–46.6)
HGB BLD-MCNC: 11.2 G/DL (ref 12–15.9)
HGB UR QL STRIP.AUTO: ABNORMAL
HYALINE CASTS UR QL AUTO: ABNORMAL /LPF
KETONES UR QL STRIP: NEGATIVE
LEUKOCYTE ESTERASE UR QL STRIP.AUTO: ABNORMAL
MCH RBC QN AUTO: 32 PG (ref 26.6–33)
MCHC RBC AUTO-ENTMCNC: 33.3 G/DL (ref 31.5–35.7)
MCV RBC AUTO: 96 FL (ref 79–97)
NITRITE UR QL STRIP: NEGATIVE
PH UR STRIP.AUTO: 6.5 [PH] (ref 5–8)
PLATELET # BLD AUTO: 248 10*3/MM3 (ref 140–450)
PMV BLD AUTO: 10 FL (ref 6–12)
POTASSIUM SERPL-SCNC: 4.1 MMOL/L (ref 3.5–5.2)
PROT UR QL STRIP: ABNORMAL
RBC # BLD AUTO: 3.5 10*6/MM3 (ref 3.77–5.28)
RBC # UR STRIP: ABNORMAL /HPF
REF LAB TEST METHOD: ABNORMAL
SODIUM SERPL-SCNC: 140 MMOL/L (ref 136–145)
SP GR UR STRIP: 1.02 (ref 1–1.03)
SQUAMOUS #/AREA URNS HPF: ABNORMAL /HPF
UROBILINOGEN UR QL STRIP: ABNORMAL
WBC # UR STRIP: ABNORMAL /HPF
WBC NRBC COR # BLD: 16.98 10*3/MM3 (ref 3.4–10.8)

## 2022-09-13 PROCEDURE — 25010000002 HYDROMORPHONE PER 4 MG: Performed by: INTERNAL MEDICINE

## 2022-09-13 PROCEDURE — 0SRB06A REPLACEMENT OF LEFT HIP JOINT WITH OXIDIZED ZIRCONIUM ON POLYETHYLENE SYNTHETIC SUBSTITUTE, UNCEMENTED, OPEN APPROACH: ICD-10-PCS | Performed by: ORTHOPAEDIC SURGERY

## 2022-09-13 PROCEDURE — 25010000002 CEFAZOLIN IN DEXTROSE 2-4 GM/100ML-% SOLUTION: Performed by: ORTHOPAEDIC SURGERY

## 2022-09-13 PROCEDURE — C1776 JOINT DEVICE (IMPLANTABLE): HCPCS | Performed by: ORTHOPAEDIC SURGERY

## 2022-09-13 PROCEDURE — 25010000002 KETOROLAC TROMETHAMINE PER 15 MG: Performed by: ORTHOPAEDIC SURGERY

## 2022-09-13 PROCEDURE — 81001 URINALYSIS AUTO W/SCOPE: CPT | Performed by: INTERNAL MEDICINE

## 2022-09-13 PROCEDURE — 87086 URINE CULTURE/COLONY COUNT: CPT | Performed by: INTERNAL MEDICINE

## 2022-09-13 PROCEDURE — 25010000002 SUCCINYLCHOLINE PER 20 MG

## 2022-09-13 PROCEDURE — 25010000002 FENTANYL CITRATE (PF) 100 MCG/2ML SOLUTION

## 2022-09-13 PROCEDURE — 72170 X-RAY EXAM OF PELVIS: CPT

## 2022-09-13 PROCEDURE — 25010000002 CLONIDINE PER 1 MG: Performed by: ORTHOPAEDIC SURGERY

## 2022-09-13 PROCEDURE — 80048 BASIC METABOLIC PNL TOTAL CA: CPT | Performed by: INTERNAL MEDICINE

## 2022-09-13 PROCEDURE — 25010000002 ROPIVACAINE PER 1 MG: Performed by: ORTHOPAEDIC SURGERY

## 2022-09-13 PROCEDURE — 76000 FLUOROSCOPY <1 HR PHYS/QHP: CPT

## 2022-09-13 PROCEDURE — 73501 X-RAY EXAM HIP UNI 1 VIEW: CPT

## 2022-09-13 PROCEDURE — P9612 CATHETERIZE FOR URINE SPEC: HCPCS

## 2022-09-13 PROCEDURE — 25010000002 PROPOFOL 10 MG/ML EMULSION

## 2022-09-13 PROCEDURE — 25010000002 ONDANSETRON PER 1 MG

## 2022-09-13 PROCEDURE — 25010000002 EPINEPHRINE 1 MG/ML SOLUTION 30 ML VIAL: Performed by: ORTHOPAEDIC SURGERY

## 2022-09-13 PROCEDURE — 25010000002 ONDANSETRON PER 1 MG: Performed by: ORTHOPAEDIC SURGERY

## 2022-09-13 PROCEDURE — 85027 COMPLETE CBC AUTOMATED: CPT | Performed by: INTERNAL MEDICINE

## 2022-09-13 DEVICE — OR3O DUAL MOBILITY LINER 36/48
Type: IMPLANTABLE DEVICE | Site: HIP | Status: FUNCTIONAL
Brand: OR3O DUAL MOBILITY

## 2022-09-13 DEVICE — OXINIUM FEMORAL HEAD 12/14 TAPER                                    22 MM +0
Type: IMPLANTABLE DEVICE | Site: HIP | Status: FUNCTIONAL
Brand: OXINIUM

## 2022-09-13 DEVICE — POLARSTEM STANDARD NON-CEMENTED                                    WITH TI/HA 2
Type: IMPLANTABLE DEVICE | Site: HIP | Status: FUNCTIONAL
Brand: POLARSTEM

## 2022-09-13 DEVICE — IMPLANTABLE DEVICE: Type: IMPLANTABLE DEVICE | Status: FUNCTIONAL

## 2022-09-13 DEVICE — OR3O DUAL MOBILITY XLPE INSERT 22/36
Type: IMPLANTABLE DEVICE | Site: HIP | Status: FUNCTIONAL
Brand: OR3O DUAL MOBILITY

## 2022-09-13 DEVICE — DEV CONTRL TISS STRATAFIX SPIRAL MNCRYL UD 3/0 PLS 30CM: Type: IMPLANTABLE DEVICE | Site: HIP | Status: FUNCTIONAL

## 2022-09-13 DEVICE — R3 3 HOLE ACETABULAR SHELL 48MM
Type: IMPLANTABLE DEVICE | Site: HIP | Status: FUNCTIONAL
Brand: R3 ACETABULAR

## 2022-09-13 RX ORDER — PROMETHAZINE HYDROCHLORIDE 25 MG/1
25 TABLET ORAL ONCE AS NEEDED
Status: DISCONTINUED | OUTPATIENT
Start: 2022-09-13 | End: 2022-09-13 | Stop reason: HOSPADM

## 2022-09-13 RX ORDER — ONDANSETRON 2 MG/ML
4 INJECTION INTRAMUSCULAR; INTRAVENOUS ONCE AS NEEDED
Status: DISCONTINUED | OUTPATIENT
Start: 2022-09-13 | End: 2022-09-13 | Stop reason: HOSPADM

## 2022-09-13 RX ORDER — PROMETHAZINE HYDROCHLORIDE 25 MG/1
25 SUPPOSITORY RECTAL ONCE AS NEEDED
Status: DISCONTINUED | OUTPATIENT
Start: 2022-09-13 | End: 2022-09-13 | Stop reason: HOSPADM

## 2022-09-13 RX ORDER — SUCCINYLCHOLINE CHLORIDE 20 MG/ML
INJECTION INTRAMUSCULAR; INTRAVENOUS AS NEEDED
Status: DISCONTINUED | OUTPATIENT
Start: 2022-09-13 | End: 2022-09-13 | Stop reason: SURG

## 2022-09-13 RX ORDER — SODIUM CHLORIDE, SODIUM LACTATE, POTASSIUM CHLORIDE, CALCIUM CHLORIDE 600; 310; 30; 20 MG/100ML; MG/100ML; MG/100ML; MG/100ML
9 INJECTION, SOLUTION INTRAVENOUS CONTINUOUS
Status: DISCONTINUED | OUTPATIENT
Start: 2022-09-13 | End: 2022-09-18 | Stop reason: HOSPADM

## 2022-09-13 RX ORDER — SODIUM CHLORIDE 0.9 % (FLUSH) 0.9 %
3-10 SYRINGE (ML) INJECTION AS NEEDED
Status: DISCONTINUED | OUTPATIENT
Start: 2022-09-13 | End: 2022-09-13 | Stop reason: HOSPADM

## 2022-09-13 RX ORDER — FAMOTIDINE 10 MG/ML
20 INJECTION, SOLUTION INTRAVENOUS ONCE
Status: COMPLETED | OUTPATIENT
Start: 2022-09-13 | End: 2022-09-13

## 2022-09-13 RX ORDER — DIPHENHYDRAMINE HYDROCHLORIDE 50 MG/ML
12.5 INJECTION INTRAMUSCULAR; INTRAVENOUS
Status: DISCONTINUED | OUTPATIENT
Start: 2022-09-13 | End: 2022-09-13 | Stop reason: HOSPADM

## 2022-09-13 RX ORDER — MAGNESIUM HYDROXIDE 1200 MG/15ML
LIQUID ORAL AS NEEDED
Status: DISCONTINUED | OUTPATIENT
Start: 2022-09-13 | End: 2022-09-13 | Stop reason: HOSPADM

## 2022-09-13 RX ORDER — LIDOCAINE HYDROCHLORIDE 10 MG/ML
0.5 INJECTION, SOLUTION EPIDURAL; INFILTRATION; INTRACAUDAL; PERINEURAL ONCE AS NEEDED
Status: DISCONTINUED | OUTPATIENT
Start: 2022-09-13 | End: 2022-09-13 | Stop reason: HOSPADM

## 2022-09-13 RX ORDER — SODIUM CHLORIDE 0.9 % (FLUSH) 0.9 %
3 SYRINGE (ML) INJECTION EVERY 12 HOURS SCHEDULED
Status: DISCONTINUED | OUTPATIENT
Start: 2022-09-13 | End: 2022-09-13 | Stop reason: HOSPADM

## 2022-09-13 RX ORDER — EPHEDRINE SULFATE 50 MG/ML
INJECTION, SOLUTION INTRAVENOUS AS NEEDED
Status: DISCONTINUED | OUTPATIENT
Start: 2022-09-13 | End: 2022-09-13 | Stop reason: SURG

## 2022-09-13 RX ORDER — FENTANYL CITRATE 50 UG/ML
50 INJECTION, SOLUTION INTRAMUSCULAR; INTRAVENOUS
Status: DISCONTINUED | OUTPATIENT
Start: 2022-09-13 | End: 2022-09-13 | Stop reason: HOSPADM

## 2022-09-13 RX ORDER — FENTANYL CITRATE 50 UG/ML
INJECTION, SOLUTION INTRAMUSCULAR; INTRAVENOUS AS NEEDED
Status: DISCONTINUED | OUTPATIENT
Start: 2022-09-13 | End: 2022-09-13 | Stop reason: SURG

## 2022-09-13 RX ORDER — LIDOCAINE HYDROCHLORIDE 20 MG/ML
INJECTION, SOLUTION INFILTRATION; PERINEURAL AS NEEDED
Status: DISCONTINUED | OUTPATIENT
Start: 2022-09-13 | End: 2022-09-13 | Stop reason: SURG

## 2022-09-13 RX ORDER — ONDANSETRON 2 MG/ML
INJECTION INTRAMUSCULAR; INTRAVENOUS AS NEEDED
Status: DISCONTINUED | OUTPATIENT
Start: 2022-09-13 | End: 2022-09-13 | Stop reason: SURG

## 2022-09-13 RX ORDER — HYDROCODONE BITARTRATE AND ACETAMINOPHEN 7.5; 325 MG/1; MG/1
1 TABLET ORAL ONCE AS NEEDED
Status: DISCONTINUED | OUTPATIENT
Start: 2022-09-13 | End: 2022-09-13 | Stop reason: HOSPADM

## 2022-09-13 RX ORDER — DIPHENHYDRAMINE HCL 25 MG
25 CAPSULE ORAL
Status: DISCONTINUED | OUTPATIENT
Start: 2022-09-13 | End: 2022-09-13 | Stop reason: HOSPADM

## 2022-09-13 RX ORDER — IBUPROFEN 600 MG/1
600 TABLET ORAL ONCE AS NEEDED
Status: DISCONTINUED | OUTPATIENT
Start: 2022-09-13 | End: 2022-09-13 | Stop reason: HOSPADM

## 2022-09-13 RX ORDER — HYDRALAZINE HYDROCHLORIDE 20 MG/ML
5 INJECTION INTRAMUSCULAR; INTRAVENOUS
Status: DISCONTINUED | OUTPATIENT
Start: 2022-09-13 | End: 2022-09-13 | Stop reason: HOSPADM

## 2022-09-13 RX ORDER — PROPOFOL 10 MG/ML
VIAL (ML) INTRAVENOUS AS NEEDED
Status: DISCONTINUED | OUTPATIENT
Start: 2022-09-13 | End: 2022-09-13 | Stop reason: SURG

## 2022-09-13 RX ORDER — CEFAZOLIN SODIUM 2 G/100ML
2 INJECTION, SOLUTION INTRAVENOUS EVERY 8 HOURS
Status: COMPLETED | OUTPATIENT
Start: 2022-09-13 | End: 2022-09-14

## 2022-09-13 RX ORDER — MULTIPLE VITAMINS W/ MINERALS TAB 9MG-400MCG
1 TAB ORAL DAILY
Status: DISCONTINUED | OUTPATIENT
Start: 2022-09-13 | End: 2022-09-18 | Stop reason: HOSPADM

## 2022-09-13 RX ORDER — OXYCODONE AND ACETAMINOPHEN 7.5; 325 MG/1; MG/1
1 TABLET ORAL EVERY 4 HOURS PRN
Status: DISCONTINUED | OUTPATIENT
Start: 2022-09-13 | End: 2022-09-13 | Stop reason: HOSPADM

## 2022-09-13 RX ORDER — LABETALOL HYDROCHLORIDE 5 MG/ML
5 INJECTION, SOLUTION INTRAVENOUS
Status: DISCONTINUED | OUTPATIENT
Start: 2022-09-13 | End: 2022-09-13 | Stop reason: HOSPADM

## 2022-09-13 RX ORDER — TRANEXAMIC ACID 100 MG/ML
INJECTION, SOLUTION INTRAVENOUS AS NEEDED
Status: DISCONTINUED | OUTPATIENT
Start: 2022-09-13 | End: 2022-09-13 | Stop reason: SURG

## 2022-09-13 RX ORDER — FLUMAZENIL 0.1 MG/ML
0.2 INJECTION INTRAVENOUS AS NEEDED
Status: DISCONTINUED | OUTPATIENT
Start: 2022-09-13 | End: 2022-09-13 | Stop reason: HOSPADM

## 2022-09-13 RX ORDER — HYDROMORPHONE HYDROCHLORIDE 1 MG/ML
0.5 INJECTION, SOLUTION INTRAMUSCULAR; INTRAVENOUS; SUBCUTANEOUS
Status: DISCONTINUED | OUTPATIENT
Start: 2022-09-13 | End: 2022-09-13 | Stop reason: HOSPADM

## 2022-09-13 RX ORDER — ASPIRIN 81 MG/1
81 TABLET, CHEWABLE ORAL 2 TIMES DAILY
Status: DISCONTINUED | OUTPATIENT
Start: 2022-09-13 | End: 2022-09-18 | Stop reason: HOSPADM

## 2022-09-13 RX ORDER — EPHEDRINE SULFATE 50 MG/ML
5 INJECTION, SOLUTION INTRAVENOUS ONCE AS NEEDED
Status: DISCONTINUED | OUTPATIENT
Start: 2022-09-13 | End: 2022-09-13 | Stop reason: HOSPADM

## 2022-09-13 RX ORDER — NALOXONE HCL 0.4 MG/ML
0.2 VIAL (ML) INJECTION AS NEEDED
Status: DISCONTINUED | OUTPATIENT
Start: 2022-09-13 | End: 2022-09-13 | Stop reason: HOSPADM

## 2022-09-13 RX ADMIN — TRANEXAMIC ACID 1000 MG: 1 INJECTION, SOLUTION INTRAVENOUS at 07:36

## 2022-09-13 RX ADMIN — FAMOTIDINE 20 MG: 10 INJECTION, SOLUTION INTRAVENOUS at 07:01

## 2022-09-13 RX ADMIN — EPHEDRINE SULFATE 10 MG: 50 INJECTION INTRAVENOUS at 08:08

## 2022-09-13 RX ADMIN — SODIUM CHLORIDE, POTASSIUM CHLORIDE, SODIUM LACTATE AND CALCIUM CHLORIDE 9 ML/HR: 600; 310; 30; 20 INJECTION, SOLUTION INTRAVENOUS at 06:58

## 2022-09-13 RX ADMIN — PROPOFOL 40 MG: 10 INJECTION, EMULSION INTRAVENOUS at 07:27

## 2022-09-13 RX ADMIN — MULTIPLE VITAMINS W/ MINERALS TAB 1 TABLET: TAB at 14:48

## 2022-09-13 RX ADMIN — CEFAZOLIN SODIUM 2 G: 2 INJECTION, SOLUTION INTRAVENOUS at 20:30

## 2022-09-13 RX ADMIN — FENTANYL CITRATE 25 MCG: 50 INJECTION INTRAMUSCULAR; INTRAVENOUS at 07:54

## 2022-09-13 RX ADMIN — FENTANYL CITRATE 25 MCG: 50 INJECTION INTRAMUSCULAR; INTRAVENOUS at 07:40

## 2022-09-13 RX ADMIN — EPHEDRINE SULFATE 10 MG: 50 INJECTION INTRAVENOUS at 07:36

## 2022-09-13 RX ADMIN — SODIUM CHLORIDE 75 ML/HR: 9 INJECTION, SOLUTION INTRAVENOUS at 13:58

## 2022-09-13 RX ADMIN — HYDROMORPHONE HYDROCHLORIDE 0.5 MG: 1 INJECTION, SOLUTION INTRAMUSCULAR; INTRAVENOUS; SUBCUTANEOUS at 02:27

## 2022-09-13 RX ADMIN — ONDANSETRON 4 MG: 2 INJECTION INTRAMUSCULAR; INTRAVENOUS at 07:12

## 2022-09-13 RX ADMIN — SUCCINYLCHOLINE CHLORIDE 120 MG: 20 INJECTION, SOLUTION INTRAMUSCULAR; INTRAVENOUS; PARENTERAL at 07:27

## 2022-09-13 RX ADMIN — ASPIRIN 81 MG: 81 TABLET, CHEWABLE ORAL at 14:48

## 2022-09-13 RX ADMIN — LIDOCAINE HYDROCHLORIDE 60 MG: 20 INJECTION, SOLUTION INFILTRATION; PERINEURAL at 07:26

## 2022-09-13 RX ADMIN — ASPIRIN 81 MG: 81 TABLET, CHEWABLE ORAL at 20:29

## 2022-09-13 RX ADMIN — CEFAZOLIN SODIUM 2 G: 2 INJECTION, SOLUTION INTRAVENOUS at 14:47

## 2022-09-13 RX ADMIN — FENTANYL CITRATE 50 MCG: 50 INJECTION INTRAMUSCULAR; INTRAVENOUS at 07:29

## 2022-09-13 RX ADMIN — PROPOFOL 50 MG: 10 INJECTION, EMULSION INTRAVENOUS at 07:26

## 2022-09-13 RX ADMIN — EPHEDRINE SULFATE 10 MG: 50 INJECTION INTRAVENOUS at 07:29

## 2022-09-13 RX ADMIN — CEFAZOLIN SODIUM 2 G: 2 INJECTION, SOLUTION INTRAVENOUS at 06:58

## 2022-09-13 RX ADMIN — ONDANSETRON 4 MG: 2 INJECTION INTRAMUSCULAR; INTRAVENOUS at 16:45

## 2022-09-13 NOTE — ANESTHESIA PREPROCEDURE EVALUATION
Anesthesia Evaluation     NPO Solid Status: > 8 hours  NPO Liquid Status: > 8 hours           Airway   Mallampati: II  TM distance: >3 FB  No difficulty expected  Dental      Pulmonary    Cardiovascular     (+) hypertension,       Neuro/Psych  GI/Hepatic/Renal/Endo      Musculoskeletal     Abdominal    Substance History      OB/GYN          Other      history of cancer    ROS/Med Hx Other: hyperglycemia                Anesthesia Plan    ASA 3     general     intravenous induction     Anesthetic plan, risks, benefits, and alternatives have been provided, discussed and informed consent has been obtained with: patient.    Plan discussed with CRNA.        CODE STATUS:    Code Status (Patient has no pulse and is not breathing): CPR (Attempt to Resuscitate)  Medical Interventions (Patient has pulse or is breathing): Full Support

## 2022-09-13 NOTE — OP NOTE
Anterior Total Hip Operative Note  Dr. FERMIN Barba II  [phone]    PATIENT NAME: Nyasia Dalal  MRN: [id number]  : 1933 AGE: 89 y.o. GENDER: female  DATE OF OPERATION: 2022  PREOPERATIVE DIAGNOSIS: Femoral Neck Fracture  POSTOPERATIVE DIAGNOSIS: Same  OPERATION PERFORMED: Left Anterior Total Hip Arthroplasty  SURGEON: Jm Barba MD  Circulator: Pascale Gudino RN  Scrub Person: Dayday Bland  Vendor Representative: Helio Bermeo  Orderly: Rigo Mcmullen  ANESTHESIA: General  ASSISTANT: None   ESTIMATED BLOOD LOSS: 100cc  SPONGE AND NEEDLE COUNT: Correct  INDICATIONS:  Fracture: This patient was noted to have a femoral neck fracture.  Surgical options were discussed with the patient and they elected to undergo a total hip replacement. A discussion of operative versus nonoperative treatment was had. They elected to undergo anterior total hip arthroplasty. The risks of surgery were discussed and included the risk of anesthesia, infection, damage to neurovascular structures, implant loosening/failure, fracture, hardware prominence, dislocation, the need for further procedures, medical complications, and others. No guarantees were made. The patient wished to proceed with surgery and a surgical consent was signed.  COMPONENTS:   · Acetabular Cup: Smith [employer] Nephew R3 acetabular cup: 48 Outer Diameter  · Cup Screws: Smith & Nephew 6.5 mm screws: No Screws Were Used  · Smith [employer] Nephew Neutral Acetabular Liner: Neutral  · Smith & Nephew Polar Femoral Stem: Size 2  · Smith [employer] Nephew Oxinium Head: Dual Mobilitymm +0    PERTINENT FINDINGS: Fracture: Fracture of the femoral neck    DETAILS OF PROCEDURE:   The patient was met in the preoperative area. The site was marked. The consent and H&P were reviewed. The patient was then wheeled back to the operative suite underwent anesthesia. The [place] table boots were secured to the patients’ feet. The patient was moved onto the [place] table and secured in the supine  position. The perineal post was inserted and the boots were secured into the leg holders. Surgical alcohol was used to thoroughly clean the operative area.     The hip and leg was then prepped in the normal sterile fashion, multiple layers of sterile drapes, and surgical space suits for the entire operative team. New outer gloves were used by all sterile surgical team members after final draping. The surgical incision was marked. A surgical timeout was performed.    A Modified Dominguez-Vicente anterior approach was used. Dissection was carried down to the fascia. The fascia was incised and the tensor fascia hermilo muscle was retracted laterally and the sartorius medially. The lateral femoral circumflex vessels were identified and cauterized using bovie. The rectus femoris was retracted medially. A capsulotomy was then performed. The capsule was tagged with Ethibond for later repair. Retractors were placed on either side of the femoral neck and dissection was further carried down so that the lesser trochanter could be palpated and the superior rim of the acetabulum could be visualized.    The femoral neck cut was then made from  just proximal to the lesser trochanter medially headed towards the saddle laterally. Care was taken not to extend the cut into the lesser or greater trochanters. The head and neck segment were removed with a corkscrew. The acetabulum was then exposed. The labrum was removed using a kocher and scalpel and excess osteophytes were removed using an osteotome.    The acetabulum was progressively reamed, beginning with medialization and then finalizing the position of the reamer to approximate the final cup position. Fluoroscopy was used to ensure proper placement of the reamer, including adequate medialization as well as appropriate abduction and anteversion. The real cup was then opened and inserted using fluoroscopy, ensuring good position in terms of abduction and anteversion.     No Screws:  Initial press-fit fixation of the cup was very robust and no screws were required for supplemental fixation.    After thorough irrigation and ensuring that no soft tissue was entrapped within the cup, the real liner was snapped into place.    The hook was placed just distal to the greater trochanter. The femur was then externally rotated, extended and adducted under the well leg. Soft tissue releases were performed to gain exposure to the proximal femur. Capsule was released from the saddle and the lateral femur. Care was taken to preserve the short external rotator tendons. The capsule was also released along the medial femur. The hydraulic femoral lift was then used to better expose the femur. Further soft tissue releases were then performed, again ensuring preservation of the short external rotators.    The femur was machined with a cookie cutter osteotome and then a rasp was used to further lateralize the starting point. The sclerotic bone on the lateral shoulder of the femur was removed with a rongeour and curette as needed to protect the greater trochanter. Progressive broaches were inserted until adequate fill had been achieved. Using fluoroscopy, the femoral stem was visualized after trial reduction of the hip. The length and offset were compared to the non-operative hip. Trials of stem size and neck length were trialed until equal leg length and offset were obtained. Additionally hip stability was tested with internal and external rotation of the leg. The leg was stable with at least 90° of external rotation and there was no impingement at 60° of internal rotation. After implantation of the final stem and ball, the leg was once again brought into normal anatomic position and relocated. Final x-rays were taken with final implants noting good position of the stem and cup, and no visualized fracture.. The hip was stable upon reduction.    No Prophylactic Cable: Before final reduction of the hip, the proximal  "femur and femoral calcar was thoroughly visualized to ensure that there was no fracture/crack. The bone quality was thought to be sufficient enough that no prophylactic cable was needed for this case.    An analgesic cocktail was then injected about the hip as well as the surgical dissection area. The capsule was closed.  The fascia was then closed with a running stitch and the skin was closed in layers.  A sterile dressing was applied.    The patient was moved from the [place] table to the rney where the boots were removed. The patient was taken to the recovery room in stable condition. There were no complications and the patient tolerated the procedure well.    R \"Gerard\" Jameson BEYER MD  Orthopaedic Surgery  [facility]  [phone]              "

## 2022-09-13 NOTE — ANESTHESIA POSTPROCEDURE EVALUATION
"Patient: Nyasia Dalal    Procedure Summary     Date: 09/13/22 Room / Location: [facility] OR [address] [place] [address] MAIN OR    Anesthesia Start: 0709 Anesthesia Stop: 0829    Procedure: TOTAL HIP ARTHROPLASTY ANTERIOR WITH HANA TABLE (Left Hip) Diagnosis:     Surgeons: Jm Barba II, MD Provider: Andrew Cline MD    Anesthesia Type: general ASA Status: 3          Anesthesia Type: general    Vitals  Vitals Value Taken Time   /79 09/13/22 0931   Temp 36.4 °C (97.5 °F) 09/13/22 0826   Pulse 90 09/13/22 0936   Resp 18 09/13/22 0930   SpO2 96 % 09/13/22 0936   Vitals shown include unvalidated device data.        Post Anesthesia Care and Evaluation    Patient location during evaluation: bedside  Patient participation: complete - patient participated  Level of consciousness: awake and alert  Pain management: adequate    Airway patency: patent  Anesthetic complications: No anesthetic complications  PONV Status: controlled  Cardiovascular status: acceptable  Respiratory status: acceptable  Hydration status: acceptable    Comments: /79   Pulse 93   Temp 36.4 °C (97.5 °F) (Oral)   Resp 18   Ht 157.5 cm (62\")   SpO2 95%   BMI 24.14 kg/m²         "

## 2022-09-13 NOTE — ANESTHESIA PROCEDURE NOTES
Airway  Urgency: elective    Date/Time: 9/13/2022 7:28 AM    General Information and Staff    Patient location during procedure: OR  CRNA/CAA: Kris Jordan CRNA    Indications and Patient Condition  Indications for airway management: airway protection    Preoxygenated: yes  Mask difficulty assessment: 1 - vent by mask    Final Airway Details  Final airway type: endotracheal airway      Successful airway: ETT  Cuffed: yes   Successful intubation technique: direct laryngoscopy  Facilitating devices/methods: intubating stylet and cricoid pressure  Endotracheal tube insertion site: oral  Blade: Kaitlynn  Blade size: 3  ETT size (mm): 7.0  Cormack-Lehane Classification: grade I - full view of glottis  Placement verified by: chest auscultation and capnometry   Measured from: teeth  ETT/EBT  to teeth (cm): 19  Number of attempts at approach: 1  Assessment: lips, teeth, and gum same as pre-op and atraumatic intubation

## 2022-09-13 NOTE — PROGRESS NOTES
Name: Nyasia Dalal ADMIT: 2022   : 1933  PCP: Guilherme Oglesby MD    MRN: [id number] LOS: 1 days   AGE/SEX: 89 y.o. female  ROOM: [facility]     Subjective   Subjective   Patient seen at bedside.       Objective   Objective   Vital Signs  Temp:  [97.5 °F (36.4 °C)-99.2 °F (37.3 °C)] 97.9 °F (36.6 °C)  Heart Rate:  [] 90  Resp:  [16-18] 16  BP: ()/(55-94) 103/64  SpO2:  [88 %-99 %] 99 %  on  Flow (L/min):  [2-4] 2;   Device (Oxygen Therapy): nasal cannula  Body mass index is 24.14 kg/m².  Physical Exam   General, confused, postop  Head and ENT, normocephalic and atraumatic.  Lungs, symmetric expansion, equal air entry bilaterally.  Heart, regular rate and rhythm.  Abdomen, soft and nontender.  Extremities, no clubbing or cyanosis.  Neuro, unable to fully evaluate  Skin: Warm and no rash.  Psych, unable to fully evaluate  Musculoskeletal, joint examination is grossly normal.      Results Review     I reviewed the patient's new clinical results.  Results from last 7 days   Lab Units 22  0532 22  1646   WBC 10*3/mm3 16.98* 17.36*   HEMOGLOBIN g/dL 11.2* 12.4   PLATELETS 10*3/mm3 248 274     Results from last 7 days   Lab Units 22  0532 22  1646   SODIUM mmol/L 140 138   POTASSIUM mmol/L 4.1 4.1   CHLORIDE mmol/L 106 100   CO2 mmol/L 24.0 24.0   BUN mg/dL 26* 20   CREATININE mg/dL 0.68 0.64   GLUCOSE mg/dL 123* 151*   EGFR mL/min/1.73 83.4 84.6     Results from last 7 days   Lab Units 22  1646   ALBUMIN g/dL 4.70   BILIRUBIN mg/dL 1.0   ALK PHOS U/L 108   AST (SGOT) U/L 26   ALT (SGPT) U/L 16     Results from last 7 days   Lab Units 22  0532 22  1646   CALCIUM mg/dL 8.3* 9.1   ALBUMIN g/dL  --  4.70       No results found for: HGBA1C, POCGLU    XR Pelvis 1 or 2 View    Result Date: 2022  1. Satisfactory postoperative appearance of the left hip.  This report was finalized on 2022 9:00 AM by Dr. Juan Manuel Chow M.D.      Scheduled  Medications  aspirin, 81 mg, Oral, BID  ceFAZolin, 2 g, Intravenous, Q8H  multivitamin with minerals, 1 tablet, Oral, Daily    Infusions  lactated ringers, 9 mL/hr, Last Rate: 9 mL/hr (09/13/22 0658)  sodium chloride, 75 mL/hr, Last Rate: 75 mL/hr (09/13/22 1358)    Diet  Diet Regular       Assessment/Plan     Active Hospital Problems    Diagnosis  POA   • **Closed displaced fracture of left femoral neck (HCC) [S72.002A]  Yes   • Other chronic pain [G89.29]  Yes   • Spinal stenosis of 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       89 y.o. female admitted with Closed displaced fracture of left femoral neck (HCC).    Assessment and plan  1.  Closed displaced fracture of left femoral neck, patient is status post left hip arthroplasty.  Continue pain control.  Encourage ambulation with physical therapy.    2.  Chronic pain, supportive management.    3.  Anemia, hemoglobin trend noted to be stable.    4.  Leukocytosis, likely reactive, continue to monitor labs.    5.  CODE STATUS is full code.  Further plans based on hospital course.    Thony Bonilla MD  [facility]  09/13/22  16:02 EDT

## 2022-09-13 NOTE — CONSULTS
"  Orthopaedic Surgery  Hip Fracture Consult Note  Dr. FERMIN Barba II  [phone]    HPI:  Patient is a 89 y.o. Not  or  female who presents with hip pain after a fall from standing.  They presented to the ER for further workup where a left Femoral Neck Fracture was found. I was consulted for further management.  Patient has multiple medical comorbidities and some dementia.    MEDICAL HISTORY  Past Medical History:   Diagnosis Date   • Breast cancer (HCC) 04/21/2014   • Irritable bowel syndrome    • Osteoporosis    • Skin cancer    ·   Past Surgical History:   Procedure Laterality Date   • BREAST LUMPECTOMY Left    • CATARACT EXTRACTION EXTRACAPSULAR W/ INTRAOCULAR LENS IMPLANTATION Bilateral    • COLONOSCOPY     • EYE SURGERY     • SKIN CANCER EXCISION  06/26/2012   ·   Prior to Admission medications    Medication Sig Start Date End Date Taking? Authorizing Provider   Diclofenac Sodium (VOLTAREN) 1 % gel gel Apply 4 g topically to the appropriate area as directed As Needed.   Yes David Medina MD   Multiple Vitamins-Minerals (HAIR SKIN AND NAILS FORMULA) tablet Take  by mouth.   Yes David Medina MD   naproxen sodium (ALEVE) 220 MG tablet Take 220 mg by mouth 2 (Two) Times a Day As Needed.   Yes David Medina MD   Omega-3 Fatty Acids (FISH OIL) 500 MG capsule Take 1 capsule by mouth.   Yes David Medina MD   Unable to find Administer 1 each to both eyes As Needed. \"thera tears\"   Yes David Medina MD   meloxicam (MOBIC) 15 MG tablet  8/31/22   Emergency, Nurse Hodan, RN   ·   Allergies   Allergen Reactions   • Latex Rash   ·   Most Recent Immunizations   Administered Date(s) Administered   • COVID-19 (PFIZER) PURPLE CAP 03/03/2021   ·   Social History     Tobacco Use   • Smoking status: Never Smoker   • Smokeless tobacco: Never Used   Substance Use Topics   • Alcohol use: Not Currently   ·    Social History     Substance and Sexual Activity   Drug Use No " "  ·     VITALS: /88 (BP Location: Left arm, Patient Position: Lying)   Pulse 90   Temp 98.3 °F (36.8 °C) (Oral)   Resp 16   Ht 157.5 cm (62\")   SpO2 96%   BMI 24.14 kg/m²  Body mass index is 24.14 kg/m².    PHYSICAL EXAM:   · CONSTITUTIONAL: No acute distress  · LUNGS: Equal chest rise, no shortness of air  · CARDIOVASCULAR: palpable peripheral pulses  · SKIN: no skin lesions in the area examined  · LYMPH: no lymphadenopathy in the area examined  · Left Lower Extremity  · Tenderness to Palpation: Tenderness to palpation at the hip  · Pulses:  Palpable DP/PT pulses  · Sensation: Sensation intact to light touch to saphenous/sural/deep peroneal/superficial peroneal/tibial nerves  · Motor: 5 out of 5 EHL/FHL/TA/GS motor complexes  · Range of Motion: Range of motion of hip deferred secondary to pain.  Positive pain with passive leg roll    RADIOLOGY REVIEW:   No radiology results for the last 7 days    LABS:   Recent Results (from the past 24 hour(s))   Comprehensive Metabolic Panel    Collection Time: 09/12/22  4:46 PM    Specimen: Blood   Result Value Ref Range    Glucose 151 (H) 65 - 99 mg/dL    BUN 20 8 - 23 mg/dL    Creatinine 0.64 0.57 - 1.00 mg/dL    Sodium 138 136 - 145 mmol/L    Potassium 4.1 3.5 - 5.2 mmol/L    Chloride 100 98 - 107 mmol/L    CO2 24.0 22.0 - 29.0 mmol/L    Calcium 9.1 8.6 - 10.5 mg/dL    Total Protein 7.0 6.0 - 8.5 g/dL    Albumin 4.70 3.50 - 5.20 g/dL    ALT (SGPT) 16 1 - 33 U/L    AST (SGOT) 26 1 - 32 U/L    Alkaline Phosphatase 108 39 - 117 U/L    Total Bilirubin 1.0 0.0 - 1.2 mg/dL    Globulin 2.3 gm/dL    A/G Ratio 2.0 g/dL    BUN/Creatinine Ratio 31.3 (H) 7.0 - 25.0    Anion Gap 14.0 5.0 - 15.0 mmol/L    eGFR 84.6 >60.0 mL/min/1.73   CBC Auto Differential    Collection Time: 09/12/22  4:46 PM    Specimen: Blood   Result Value Ref Range    WBC 17.36 (H) 3.40 - 10.80 10*3/mm3    RBC 3.94 3.77 - 5.28 10*6/mm3    Hemoglobin 12.4 12.0 - 15.9 g/dL    Hematocrit 36.9 34.0 - 46.6 %    " MCV 93.7 79.0 - 97.0 fL    MCH 31.5 26.6 - 33.0 pg    MCHC 33.6 31.5 - 35.7 g/dL    RDW 12.2 (L) 12.3 - 15.4 %    RDW-SD 41.9 37.0 - 54.0 fl    MPV 10.0 6.0 - 12.0 fL    Platelets 274 140 - 450 10*3/mm3    Neutrophil % 93.3 (H) 42.7 - 76.0 %    Lymphocyte % 2.6 (L) 19.6 - 45.3 %    Monocyte % 3.3 (L) 5.0 - 12.0 %    Eosinophil % 0.0 (L) 0.3 - 6.2 %    Basophil % 0.1 0.0 - 1.5 %    Immature Grans % 0.7 (H) 0.0 - 0.5 %    Neutrophils, Absolute 16.18 (H) 1.70 - 7.00 10*3/mm3    Lymphocytes, Absolute 0.45 (L) 0.70 - 3.10 10*3/mm3    Monocytes, Absolute 0.58 0.10 - 0.90 10*3/mm3    Eosinophils, Absolute 0.00 0.00 - 0.40 10*3/mm3    Basophils, Absolute 0.02 0.00 - 0.20 10*3/mm3    Immature Grans, Absolute 0.13 (H) 0.00 - 0.05 10*3/mm3    nRBC 0.0 0.0 - 0.2 /100 WBC   ECG 12 Lead    Collection Time: 09/12/22  5:12 PM   Result Value Ref Range    QT Interval 375 ms   Urinalysis With Culture If Indicated - Urine, Random Void    Collection Time: 09/13/22  1:06 AM    Specimen: Urine, Random Void   Result Value Ref Range    Color, UA Yellow Yellow, Straw    Appearance, UA Cloudy (A) Clear    pH, UA 6.5 5.0 - 8.0    Specific Gravity, UA 1.017 1.005 - 1.030    Glucose, UA Negative Negative    Ketones, UA Negative Negative    Bilirubin, UA Negative Negative    Blood, UA Small (1+) (A) Negative    Protein, UA 30 mg/dL (1+) (A) Negative    Leuk Esterase, UA Small (1+) (A) Negative    Nitrite, UA Negative Negative    Urobilinogen, UA 1.0 E.U./dL 0.2 - 1.0 E.U./dL   Urinalysis, Microscopic Only - Urine, Random Void    Collection Time: 09/13/22  1:06 AM    Specimen: Urine, Random Void   Result Value Ref Range    RBC, UA 6-12 (A) None Seen, 0-2 /HPF    WBC, UA 21-30 (A) None Seen, 0-2 /HPF    Bacteria, UA 4+ (A) None Seen /HPF    Squamous Epithelial Cells, UA 0-2 None Seen, 0-2 /HPF    Hyaline Casts, UA 21-30 None Seen /LPF    Methodology Automated Microscopy    Basic Metabolic Panel    Collection Time: 09/13/22  5:32 AM    Specimen:  "Blood   Result Value Ref Range    Glucose 123 (H) 65 - 99 mg/dL    BUN 26 (H) 8 - 23 mg/dL    Creatinine 0.68 0.57 - 1.00 mg/dL    Sodium 140 136 - 145 mmol/L    Potassium 4.1 3.5 - 5.2 mmol/L    Chloride 106 98 - 107 mmol/L    CO2 24.0 22.0 - 29.0 mmol/L    Calcium 8.3 (L) 8.6 - 10.5 mg/dL    BUN/Creatinine Ratio 38.2 (H) 7.0 - 25.0    Anion Gap 10.0 5.0 - 15.0 mmol/L    eGFR 83.4 >60.0 mL/min/1.73   CBC (No Diff)    Collection Time: 09/13/22  5:32 AM    Specimen: Blood   Result Value Ref Range    WBC 16.98 (H) 3.40 - 10.80 10*3/mm3    RBC 3.50 (L) 3.77 - 5.28 10*6/mm3    Hemoglobin 11.2 (L) 12.0 - 15.9 g/dL    Hematocrit 33.6 (L) 34.0 - 46.6 %    MCV 96.0 79.0 - 97.0 fL    MCH 32.0 26.6 - 33.0 pg    MCHC 33.3 31.5 - 35.7 g/dL    RDW 12.5 12.3 - 15.4 %    RDW-SD 44.1 37.0 - 54.0 fl    MPV 10.0 6.0 - 12.0 fL    Platelets 248 140 - 450 10*3/mm3       IMPRESSION:  Patient is a 89 y.o. Not  or  female with left Femoral Neck Fracture    PLAN:   · Admited to: Smitha Lara MD  · Diet: NPO Now  · Weight Bearing:Left Lower Extremity Non Weight Bearing until after surgery  · Disposition: Plan hip arthroplasty today for left femoral neck fracture.  Risk benefits and alternatives discussed with the family who is the POA.  All questions answered    R \"Gerard\" Jameson BEYER MD  Orthopaedic Surgery  [facility]  [phone] - [place] Office  [phone] - [place] Office          "

## 2022-09-14 LAB
ALBUMIN SERPL-MCNC: 3.4 G/DL (ref 3.5–5.2)
ALBUMIN/GLOB SERPL: 1.7 G/DL
ALP SERPL-CCNC: 78 U/L (ref 39–117)
ALT SERPL W P-5'-P-CCNC: 13 U/L (ref 1–33)
ANION GAP SERPL CALCULATED.3IONS-SCNC: 9.6 MMOL/L (ref 5–15)
AST SERPL-CCNC: 25 U/L (ref 1–32)
BACTERIA SPEC AEROBE CULT: NORMAL
BASOPHILS # BLD AUTO: 0.01 10*3/MM3 (ref 0–0.2)
BASOPHILS NFR BLD AUTO: 0.1 % (ref 0–1.5)
BILIRUB SERPL-MCNC: 0.5 MG/DL (ref 0–1.2)
BUN SERPL-MCNC: 32 MG/DL (ref 8–23)
BUN/CREAT SERPL: 42.1 (ref 7–25)
CALCIUM SPEC-SCNC: 7.8 MG/DL (ref 8.6–10.5)
CHLORIDE SERPL-SCNC: 105 MMOL/L (ref 98–107)
CO2 SERPL-SCNC: 24.4 MMOL/L (ref 22–29)
CREAT SERPL-MCNC: 0.76 MG/DL (ref 0.57–1)
DEPRECATED RDW RBC AUTO: 43.7 FL (ref 37–54)
EGFRCR SERPLBLD CKD-EPI 2021: 75 ML/MIN/1.73
EOSINOPHIL # BLD AUTO: 0.02 10*3/MM3 (ref 0–0.4)
EOSINOPHIL NFR BLD AUTO: 0.2 % (ref 0.3–6.2)
ERYTHROCYTE [DISTWIDTH] IN BLOOD BY AUTOMATED COUNT: 12.5 % (ref 12.3–15.4)
GLOBULIN UR ELPH-MCNC: 2 GM/DL
GLUCOSE SERPL-MCNC: 125 MG/DL (ref 65–99)
HCT VFR BLD AUTO: 28.2 % (ref 34–46.6)
HGB BLD-MCNC: 9.2 G/DL (ref 12–15.9)
IMM GRANULOCYTES # BLD AUTO: 0.05 10*3/MM3 (ref 0–0.05)
IMM GRANULOCYTES NFR BLD AUTO: 0.5 % (ref 0–0.5)
LYMPHOCYTES # BLD AUTO: 0.99 10*3/MM3 (ref 0.7–3.1)
LYMPHOCYTES NFR BLD AUTO: 9 % (ref 19.6–45.3)
MCH RBC QN AUTO: 31 PG (ref 26.6–33)
MCHC RBC AUTO-ENTMCNC: 32.6 G/DL (ref 31.5–35.7)
MCV RBC AUTO: 94.9 FL (ref 79–97)
MONOCYTES # BLD AUTO: 0.78 10*3/MM3 (ref 0.1–0.9)
MONOCYTES NFR BLD AUTO: 7.1 % (ref 5–12)
NEUTROPHILS NFR BLD AUTO: 83.1 % (ref 42.7–76)
NEUTROPHILS NFR BLD AUTO: 9.12 10*3/MM3 (ref 1.7–7)
NRBC BLD AUTO-RTO: 0 /100 WBC (ref 0–0.2)
PLATELET # BLD AUTO: 199 10*3/MM3 (ref 140–450)
PMV BLD AUTO: 10.2 FL (ref 6–12)
POTASSIUM SERPL-SCNC: 4.1 MMOL/L (ref 3.5–5.2)
PROT SERPL-MCNC: 5.4 G/DL (ref 6–8.5)
RBC # BLD AUTO: 2.97 10*6/MM3 (ref 3.77–5.28)
SODIUM SERPL-SCNC: 139 MMOL/L (ref 136–145)
WBC NRBC COR # BLD: 10.97 10*3/MM3 (ref 3.4–10.8)

## 2022-09-14 PROCEDURE — 80053 COMPREHEN METABOLIC PANEL: CPT | Performed by: INTERNAL MEDICINE

## 2022-09-14 PROCEDURE — 85025 COMPLETE CBC W/AUTO DIFF WBC: CPT | Performed by: INTERNAL MEDICINE

## 2022-09-14 PROCEDURE — 97162 PT EVAL MOD COMPLEX 30 MIN: CPT

## 2022-09-14 PROCEDURE — P9612 CATHETERIZE FOR URINE SPEC: HCPCS

## 2022-09-14 PROCEDURE — 25010000002 CEFAZOLIN IN DEXTROSE 2-4 GM/100ML-% SOLUTION: Performed by: ORTHOPAEDIC SURGERY

## 2022-09-14 PROCEDURE — 97116 GAIT TRAINING THERAPY: CPT

## 2022-09-14 RX ADMIN — CEFAZOLIN SODIUM 2 G: 2 INJECTION, SOLUTION INTRAVENOUS at 04:31

## 2022-09-14 RX ADMIN — ASPIRIN 81 MG: 81 TABLET, CHEWABLE ORAL at 20:57

## 2022-09-14 RX ADMIN — ASPIRIN 81 MG: 81 TABLET, CHEWABLE ORAL at 08:47

## 2022-09-14 RX ADMIN — MULTIPLE VITAMINS W/ MINERALS TAB 1 TABLET: TAB at 08:47

## 2022-09-14 RX ADMIN — HYDROCODONE BITARTRATE AND ACETAMINOPHEN 1 TABLET: 5; 325 TABLET ORAL at 08:47

## 2022-09-14 NOTE — THERAPY EVALUATION
Patient Name: Nyasia Dalal  : 1933    MRN: [id number]                              Today's Date: 2022       Admit Date: 2022    Visit Dx:     ICD-10-CM ICD-9-CM   1. Closed displaced fracture of left femoral neck (HCC)  S72.002A 820.8   2. Fall from ground level  W18.30XA E888.9     Patient Active Problem List   Diagnosis   • Osteoporosis   • Spinal stenosis of lumbar region without neurogenic claudication   • Other chronic pain   • Sacroiliac joint dysfunction of right side   • Lumbar facet arthropathy   • Closed displaced fracture of left femoral neck (HCC)     Past Medical History:   Diagnosis Date   • Breast cancer (HCC) 2014   • Irritable bowel syndrome    • Osteoporosis    • Skin cancer      Past Surgical History:   Procedure Laterality Date   • BREAST LUMPECTOMY Left    • CATARACT EXTRACTION EXTRACAPSULAR W/ INTRAOCULAR LENS IMPLANTATION Bilateral    • COLONOSCOPY     • EYE SURGERY     • SKIN CANCER EXCISION  2012   • TOTAL HIP ARTHROPLASTY Left 2022    Procedure: TOTAL HIP ARTHROPLASTY ANTERIOR WITH HANA TABLE;  Surgeon: Jm Barba II, MD;  Location: [facility];  Service: Orthopedics;  Laterality: Left;      General Information     Row Name 22 1355          Physical Therapy Time and Intention    Document Type evaluation  -     Mode of Treatment individual therapy;physical therapy  -     Row Name 22 1359          General Information    Patient Profile Reviewed yes  -     Prior Level of Function independent:;gait;bed mobility;transfer  Pt reports she was ambulatory with no AD - seems confused throughout session though answering orientation questions correctly. Unsure of reliability.  -     Existing Precautions/Restrictions fall  -     Barriers to Rehab none identified  -     Row Name 22 1358          Living Environment    People in Home facility resident  long term per chart  -     Row Name 22 135          Home Main  Entrance    Number of Stairs, Main Entrance none  -     Row Name 09/14/22 1355          Stairs Within Home, Primary    Number of Stairs, Within Home, Primary none  -     Row Name 09/14/22 1355          Cognition    Orientation Status (Cognition) oriented to;person;place  Anwers orientation questions appropriately, except disoriented to time. Seems confused to situation throughout session.  -     Row Name 09/14/22 1353          Safety Issues, Functional Mobility    Impairments Affecting Function (Mobility) balance;cognition;endurance/activity tolerance;strength;pain;range of motion (ROM)  -     Cognitive Impairments, Mobility Safety/Performance attention;insight into deficits/self-awareness;judgment;problem-solving/reasoning;safety precaution awareness;safety precaution follow-through;sequencing abilities  -           User Key  (r) = Recorded By, (t) = Taken By, (c) = Cosigned By    Initials Name Provider Type     Sunita Mendieta PT Physical Therapist               Mobility     Row Name 09/14/22 1357          Bed Mobility    Bed Mobility supine-sit  -     Supine-Sit [place] (Bed Mobility) 2 person assist;verbal cues;nonverbal cues (demo/gesture);minimum assist (75% patient effort)  -     Assistive Device (Bed Mobility) bed rails;head of bed elevated  -     Comment, (Bed Mobility) Heavy cues for reaching for bedrails and initiating movement  -[facility] Name 09/14/22 1357          Sit-Stand Transfer    Sit-Stand [place] (Transfers) minimum assist (75% patient effort);2 person assist;nonverbal cues (demo/gesture);verbal cues  -     Assistive Device (Sit-Stand Transfers) other (see comments)  HHA x2  -     Comment, (Sit-Stand Transfer) HHA x2 for standing to ensure safety - still fairly well and safe to use rwx with future stands/gait  -     Row Name 09/14/22 1352          Gait/Stairs (Locomotion)    [place] Level (Gait) minimum assist (75% patient effort);moderate assist (50%  patient effort);2 person assist  -     Assistive Device (Gait) walker, front-wheeled  -     Distance in Feet (Gait) 5ft to chair  -     Deviations/Abnormal Patterns (Gait) antalgic;base of support, narrow;mariah decreased;festinating/shuffling;gait speed decreased;stride length decreased;weight shifting decreased  -     Bilateral Gait Deviations forward flexed posture;heel strike decreased  -     Comment, (Gait/Stairs) Unsteady with very slow pace. Heavy cues for sequencing of steps and intermittent assist manually moving BLE to take steps - improved with single step commands for which foot to take a step with and manual assist with advancement of rwx  -[facility] Name 09/14/22 3751          Mobility    Extremity Weight-bearing Status left lower extremity  -     Left Lower Extremity (Weight-bearing Status) weight-bearing as tolerated (WBAT)  -           User Key  (r) = Recorded By, (t) = Taken By, (c) = Cosigned By    Initials Name Provider Type     Sunita Mendieta, PT Physical Therapist               Obj/Interventions     [facility] Name 09/14/22 1401          Range of Motion Comprehensive    General Range of Motion lower extremity range of motion deficits identified  -     Comment, General Range of Motion Expected post-op L hip ROM deficits  -[facility] Name 09/14/22 1401          Strength Comprehensive (MMT)    General Manual Muscle Testing (MMT) Assessment lower extremity strength deficits identified  -     Comment, General Manual Muscle Testing (MMT) Assessment Expected post-op strength deficits, RLE grossly 3+/5, LLE grossly 3/5  -[facility] Name 09/14/22 1401          Motor Skills    Therapeutic Exercise --  5 reps FLORINA protocol  -[facility] Name 09/14/22 1401          Balance    Balance Assessment sitting static balance;sitting dynamic balance;standing dynamic balance;standing static balance  -     Static Sitting Balance contact guard  -     Dynamic Sitting Balance contact guard  -      Position, Sitting Balance unsupported;sitting edge of bed  -     Static Standing Balance contact guard;verbal cues  -     Dynamic Standing Balance minimal assist;verbal cues;moderate assist  -     Position/Device Used, Standing Balance walker, front-wheeled;supported  -[facility] Name 09/14/22 1401          Sensory Assessment (Somatosensory)    Sensory Assessment (Somatosensory) LE sensation intact  -           User Key  (r) = Recorded By, (t) = Taken By, (c) = Cosigned By    Initials Name Provider Type     Sunita Mendieta PT Physical Therapist               Goals/Plan     [facility] Name 09/14/22 1410          Bed Mobility Goal 1 (PT)    Activity/Assistive Device (Bed Mobility Goal 1, PT) bed mobility activities, all  -     [place] Level/Cues Needed (Bed Mobility Goal 1, PT) contact guard required  -     Time Frame (Bed Mobility Goal 1, PT) 1 week  -BH     Row Name 09/14/22 1410          Transfer Goal 1 (PT)    Activity/Assistive Device (Transfer Goal 1, PT) transfers, all  -     [place] Level/Cues Needed (Transfer Goal 1, PT) standby assist  -     Time Frame (Transfer Goal 1, PT) 1 week  -BH     Row Name 09/14/22 1410          Gait Training Goal 1 (PT)    Activity/Assistive Device (Gait Training Goal 1, PT) gait (walking locomotion)  -     [place] Level (Gait Training Goal 1, PT) contact guard required  -     Distance (Gait Training Goal 1, PT) 50ft  -     Time Frame (Gait Training Goal 1, PT) 1 week  -[facility] Name 09/14/22 1410          Therapy Assessment/Plan (PT)    Planned Therapy Interventions (PT) balance training;bed mobility training;gait training;home exercise program;patient/family education;strengthening;ROM (range of motion);transfer training  -           User Key  (r) = Recorded By, (t) = Taken By, (c) = Cosigned By    Initials Name Provider Type     Sunita Mendieta PT Physical Therapist               Clinical Impression     Row Name 09/14/22 1409           Pain    Pretreatment Pain Rating 0/10 - no pain  -     Posttreatment Pain Rating 9/10  -     Pain Location - Side/Orientation Left  -     Pain Location incisional  -     Pain Location - hip  -     Pain Intervention(s) Repositioned;Ambulation/increased activity;Rest  -     Row Name 09/14/22 1401          Plan of Care Review    Plan of Care Reviewed With patient  -     Outcome Evaluation Pt is an 88 yo F admitted from [facility] following a fall - L femoral neck fx. Pt is now POD 1 L anterior FLORINA and is WBAT. Pt oriented to self and place on eval, disoriented to time and situation. Per chart, pt lives in assisted living and pt reports he daughter helps her as needed, but she is normally ambulatory w/o a rwx. Unsure of reliability of pt report. Today, pt able to transfer to EOB with min A x2 - heavy cues for positioning and use of bedrails to assist. Pt demo'd good sitting balance EOB and was able to stand with min A x2 and HHA for safety. Pt demo'd good standing balance and was safe to use a rwx to take a few steps to the chair. Pt required max cues for sequencing of steps with intermittent manual assist to initiate forward movement of feet and total assist for advancement of rwx. Pt very unsteady and seemed confused throughout session with goal of tasks. Given frequent reminders and assist throughout for safety, but pt able to get to chair and assisted with repositioning. Pt have max difficulty following commands/visual demos for FLORINA exercises - AAROM-PROM for all with pt reporting 9/10 pain at end of session, though seeming in no distress. Pt left with all needs met and RN updated. PT will continue to follow to progress mobility as tolerated. Anticipate D/C to SNF.  -     Row Name 09/14/22 8962          Therapy Assessment/Plan (PT)    Patient/Family Therapy Goals Statement (PT) Return to PLOF  -     Rehab Potential (PT) good, to achieve stated therapy goals  -     Criteria for Skilled Interventions Met  (PT) yes  -     Therapy Frequency (PT) daily  -     Row Name 09/14/22 1403          Vital Signs    O2 Delivery Pre Treatment room air  -     O2 Delivery Intra Treatment room air  -     O2 Delivery Post Treatment room air  -     Row Name 09/14/22 1403          Positioning and Restraints    Pre-Treatment Position in bed  -     Post Treatment Position chair  -     In Chair notified nsg;call light within reach;encouraged to call for assist;reclined;exit alarm on  -           User Key  (r) = Recorded By, (t) = Taken By, (c) = Cosigned By    Initials Name Provider Type     Sunita Mendieta PT Physical Therapist               Outcome Measures     Row Name 09/14/22 1410          How much help from another person do you currently need...    Turning from your back to your side while in flat bed without using bedrails? 2  -BH     Moving from lying on back to sitting on the side of a flat bed without bedrails? 2  -BH     Moving to and from a bed to a chair (including a wheelchair)? 2  -     Standing up from a chair using your arms (e.g., wheelchair, bedside chair)? 3  -     Climbing 3-5 steps with a railing? 1  -     To walk in hospital room? 2  -     AM-PAC 6 Clicks Score (PT) 12  -     Highest level of mobility 4 --> Transferred to chair/commode  -     Row Name 09/14/22 1410          Functional Assessment    Outcome Measure Options AM-PAC 6 Clicks Basic Mobility (PT)  -           User Key  (r) = Recorded By, (t) = Taken By, (c) = Cosigned By    Initials Name Provider Type     Sunita Mendieta PT Physical Therapist                             Physical Therapy Education                 Title: PT OT SLP Therapies (Done)     Topic: Physical Therapy (Done)     Point: Mobility training (Done)     Learning Progress Summary           Patient Acceptance, TB,D,E, VU,NR by  at 9/14/2022 1411                   Point: Home exercise program (Done)     Learning Progress Summary           Patient  Acceptance, TB,D,E, VU,NR by  at 9/14/2022 1411                   Point: Body mechanics (Done)     Learning Progress Summary           Patient Acceptance, TB,D,E, VU,NR by  at 9/14/2022 1411                   Point: Precautions (Done)     Learning Progress Summary           Patient Acceptance, TB,D,E, VU,NR by  at 9/14/2022 1411                               User Key     Initials Effective Dates Name Provider Type Discipline     04/08/22 -  Sunita Mendieta, PT Physical Therapist PT              PT Recommendation and Plan  Planned Therapy Interventions (PT): balance training, bed mobility training, gait training, home exercise program, patient/family education, strengthening, ROM (range of motion), transfer training  Plan of Care Reviewed With: patient  Outcome Evaluation: Pt is an 89 yo F admitted from [facility] following a fall - L femoral neck fx. Pt is now POD 1 L anterior FLORINA and is WBAT. Pt oriented to self and place on eval, disoriented to time and situation. Per chart, pt lives in assisted living and pt reports he daughter helps her as needed, but she is normally ambulatory w/o a rwx. Unsure of reliability of pt report. Today, pt able to transfer to EOB with min A x2 - heavy cues for positioning and use of bedrails to assist. Pt demo'd good sitting balance EOB and was able to stand with min A x2 and HHA for safety. Pt demo'd good standing balance and was safe to use a rwx to take a few steps to the chair. Pt required max cues for sequencing of steps with intermittent manual assist to initiate forward movement of feet and total assist for advancement of rwx. Pt very unsteady and seemed confused throughout session with goal of tasks. Given frequent reminders and assist throughout for safety, but pt able to get to chair and assisted with repositioning. Pt have max difficulty following commands/visual demos for FLORINA exercises - AAROM-PROM for all with pt reporting 9/10 pain at end of session, though seeming in  no distress. Pt left with all needs met and RN updated. PT will continue to follow to progress mobility as tolerated. Anticipate D/C to SNF.     Time Calculation:    PT Charges     Row Name 09/14/22 1411             Time Calculation    Start Time 1012  -      Stop Time 1027  -      Time Calculation (min) 15 min  -      PT Received On 09/14/22  -      PT - Next Appointment 09/15/22  -      PT Goal Re-Cert Due Date 09/21/22  -              Time Calculation- PT    Total Timed Code Minutes- PT 12 minute(s)  -              Timed Charges    09887 - Gait Training Minutes  8  -      80836 - PT Therapeutic Activity Minutes 4  -              Untimed Charges    PT Eval/Re-eval Minutes 5  -              Total Minutes    Timed Charges Total Minutes 12  -      Untimed Charges Total Minutes 5  -       Total Minutes 17  -            User Key  (r) = Recorded By, (t) = Taken By, (c) = Cosigned By    Initials Name Provider Type     Sunita Mendieta, PT Physical Therapist              Therapy Charges for Today     Code Description Service Date Service Provider Modifiers Qty    68043367988 HC GAIT TRAINING EA 15 MIN 9/14/2022 Sunita Mendieta, PT GP 1    [id number]  PT EVAL MOD COMPLEXITY 3 9/14/2022 Sunita Mendieta, PT GP 1          PT G-Codes  Outcome Measure Options: AM-PAC 6 Clicks Basic Mobility (PT)  AM-PAC 6 Clicks Score (PT): 12    Sunita Mendieta PT  9/14/2022

## 2022-09-14 NOTE — PROGRESS NOTES
Name: Nyasia Dalal ADMIT: 2022   : 1933  PCP: Guilherme Oglesby MD    MRN: [id number] LOS: 2 days   AGE/SEX: 89 y.o. female  ROOM: [facility]     Subjective   Subjective   Patient seen at bedside.       Objective   Objective   Vital Signs  Temp:  [97 °F (36.1 °C)-98.2 °F (36.8 °C)] 97.6 °F (36.4 °C)  Heart Rate:  [] 98  Resp:  [16] 16  BP: (107-135)/(51-74) 135/71  SpO2:  [92 %-98 %] 98 %  on  Flow (L/min):  [2] 2;   Device (Oxygen Therapy): nasal cannula  Body mass index is 24.14 kg/m².  Physical Exam   General, confused, appears sick on chronic basis.  Head and ENT, normocephalic and atraumatic.  Lungs, symmetric expansion, equal air entry bilaterally.  Heart, regular rate and rhythm.  Abdomen, soft and nontender.  Extremities, no clubbing or cyanosis.  Neuro, unable to fully evaluate  Skin: Warm and no rash.  Psych, unable to fully evaluate  Musculoskeletal, joint examination is grossly normal.    Results Review     I reviewed the patient's new clinical results.  Results from last 7 days   Lab Units 22  0522  1646   WBC 10*3/mm3 10.97* 16.98* 17.36*   HEMOGLOBIN g/dL 9.2* 11.2* 12.4   PLATELETS 10*3/mm3 199 248 274     Results from last 7 days   Lab Units 22  0532 22  1646   SODIUM mmol/L 139 140 138   POTASSIUM mmol/L 4.1 4.1 4.1   CHLORIDE mmol/L 105 106 100   CO2 mmol/L 24.4 24.0 24.0   BUN mg/dL 32* 26* 20   CREATININE mg/dL 0.76 0.68 0.64   GLUCOSE mg/dL 125* 123* 151*   EGFR mL/min/1.73 75.0 83.4 84.6     Results from last 7 days   Lab Units 225 22  1646   ALBUMIN g/dL 3.40* 4.70   BILIRUBIN mg/dL 0.5 1.0   ALK PHOS U/L 78 108   AST (SGOT) U/L 25 26   ALT (SGPT) U/L 13 16     Results from last 7 days   Lab Units 22  0445 22  0532 22  1646   CALCIUM mg/dL 7.8* 8.3* 9.1   ALBUMIN g/dL 3.40*  --  4.70       No results found for: HGBA1C, POCGLU    XR Pelvis 1 or 2 View    Result Date: 2022  1.  Satisfactory postoperative appearance of the left hip.  This report was finalized on 9/13/2022 9:00 AM by Dr. Juan Manuel Chow M.D.      Scheduled Medications  aspirin, 81 mg, Oral, BID  multivitamin with minerals, 1 tablet, Oral, Daily    Infusions  lactated ringers, 9 mL/hr, Last Rate: 9 mL/hr (09/13/22 0658)  sodium chloride, 75 mL/hr, Last Rate: 75 mL/hr (09/13/22 1358)    Diet  Diet Regular       Assessment/Plan     Active Hospital Problems    Diagnosis  POA   • **Closed displaced fracture of left femoral neck (HCC) [S72.002A]  Yes   • Other chronic pain [G89.29]  Yes   • Spinal stenosis of 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       89 y.o. female admitted with Closed displaced fracture of left femoral neck (HCC).    Assessment and plan  1.  Closed displaced fracture of left femoral neck, patient is status post left hip arthroplasty.  Continue pain control.  Encourage ambulation with physical therapy.     2.  Chronic pain, supportive management.     3.  Anemia, mild drop in hemoglobin noted, continue to monitor.     4.  Leukocytosis, likely reactive, continue to monitor labs.  Improvement noted.     5.  CODE STATUS is full code.  Further plans based on hospital course.         Thony Bonilla MD  [facility]  09/14/22  18:35 EDT

## 2022-09-14 NOTE — DISCHARGE PLACEMENT REQUEST
"Nyasia Dalal (89 y.o. Female)             Date of Birth   08/22/1933    Social Security Number       Address   [address] [place] [address] [address] [place] [address]    Home Phone   [phone]    MRN   [id number]       Orthodoxy   Denominational    Marital Status                               Admission Date   9/12/22    Admission Type   Emergency    Admitting Provider   Smitha Lara MD    Attending Provider   Thony Bonilla MD    Department, Room/Bed   [address], P894/1       Discharge Date       Discharge Disposition       Discharge Destination                               Attending Provider: Thony Bonilla MD    Allergies: Latex    Isolation: None   Infection: None   Code Status: CPR   Advance Care Planning Activity    Ht: 157.5 cm (62\")   Wt: --    Admission Cmt: None   Principal Problem: Closed displaced fracture of left femoral neck (HCC) [S72.002A]                 Active Insurance as of 9/12/2022     Primary Coverage     Payor Plan Insurance Group Employer/Plan Group    MEDICARE MEDICARE A & B      Payor Plan Address Payor Plan Phone Number Payor Plan Fax Number Effective Dates    [address] [phone]  8/1/1998 - None Entered    [facility] [id number]       Subscriber Name Subscriber Birth Date Member ID       NYASIA DALAL 8/22/1933 [id number]           Secondary Coverage     Payor Plan Insurance Group Employer/Plan Group     FOR LIFE  FOR LIFE [employer] SUP  [id number]     Payor Plan Address Payor Plan Phone Number Payor Plan Fax Number Effective Dates    [address] [phone]  8/9/2016 - None Entered    [facility] [id number]       Subscriber Name Subscriber Birth Date Member ID       NYASIA DALAL 8/22/1933 [id number]                 Emergency Contacts      (Rel.) Home Phone Work Phone Mobile Phone    Terrie Dominguez (Daughter) -- -- [phone]    Guilherme Dalal (Son) [phone] -- --              "

## 2022-09-14 NOTE — PLAN OF CARE
Goal Outcome Evaluation:  Plan of Care Reviewed With: patient        Progress: no change  Outcome Evaluation: Denies pain, turns well in bed. Did not void this shift, bladder scan 197, 230. States she does not have to pee.

## 2022-09-14 NOTE — CASE MANAGEMENT/SOCIAL WORK
Discharge Planning Assessment  [facility]     Patient Name: Nyasia Dalal  MRN: [id number]  Today's Date: 9/14/2022    Admit Date: 9/12/2022     Discharge Needs Assessment     Row Name 09/14/22 0918       Living Environment    People in Home facility resident    Current Living Arrangements assisted living facility    Primary Care Provided by self    Provides Primary Care For no one    Family Caregiver if Needed child(rajni), adult    Family Caregiver Names Terrie, daughter & Guilherme, son    Quality of Family Relationships helpful;involved;supportive    Able to Return to Prior Arrangements no  Patient will need rehab @ a SNF before returning to [facility]       Resource/Environmental Concerns    Resource/Environmental Concerns none    Transportation Concerns none       Transition Planning    Patient/Family Anticipates Transition to inpatient rehabilitation facility    Patient/Family Anticipated Services at Transition rehabilitation services    Transportation Anticipated health plan transportation       Discharge Needs Assessment    Readmission Within the Last 30 Days no previous admission in last 30 days    Current Outpatient/Agency/Support Group assisted living facility    Equipment Currently Used at Home none    Concerns to be Addressed no discharge needs identified;denies needs/concerns at this time    Anticipated Changes Related to Illness none    Equipment Needed After Discharge none    Outpatient/Agency/Support Group Needs inpatient rehabilitation facility    Discharge Facility/Level of Care Needs rehabilitation facility    Provided Post Acute Provider List? Yes    Post Acute Provider List Inpatient Rehab    Delivered To Support Person    Method of Delivery Telephone               Discharge Plan     Row Name 09/14/22 0921       Plan    Plan SNF, pending referrals    Patient/Family in Agreement with Plan yes    Post Acute Provider List Inpatient Rehab    Plan Comments CCP made outbound call to patient's daughter  Terrie this date at 9:06am regarding screen and discharge planning for patient due to patient's cognitive impairments related to dementia. Terrie verified all of patient's information on face sheet. Patient lives at [facility]. Patient's PCP is Guilherme Naylor. Plan is for patient to go to SNF for rehab before returning to [facility]. Terrie agreeable to [facility] referrals. CCP to make referrals and follow.              Continued Care and Services - Admitted Since 9/12/2022    Coordination has not been started for this encounter.          Demographic Summary     Row Name 09/14/22 0917       General Information    Admission Type inpatient    Arrived From PACU/recovery room    Reason for Consult discharge planning    Preferred Language English               Functional Status     Row Name 09/14/22 0917       Functional Status    Usual Activity Tolerance moderate    Current Activity Tolerance moderate       Functional Status, IADL    Medications assistive person    Meal Preparation assistive person    Housekeeping assistive person    Laundry assistive person    Shopping assistive person       Mental Status    General Appearance WDL WDL       Mental Status Summary    Recent Changes in Mental Status/Cognitive Functioning no changes       Employment/    Employment Status retired               Psychosocial    No documentation.                Abuse/Neglect    No documentation.                Legal    No documentation.                Substance Abuse    No documentation.                Patient Forms    No documentation.

## 2022-09-14 NOTE — PLAN OF CARE
Goal Outcome Evaluation:  Plan of Care Reviewed With: patient           Outcome Evaluation: Pt is an 89 yo F admitted from long term following a fall - L femoral neck fx. Pt is now POD 1 L anterior FLORINA and is WBAT. Pt oriented to self and place on eval, disoriented to time and situation. Per chart, pt lives in assisted living and pt reports he daughter helps her as needed, but she is normally ambulatory w/o a rwx. Unsure of reliability of pt report. Today, pt able to transfer to EOB with min A x2 - heavy cues for positioning and use of bedrails to assist. Pt demo'd good sitting balance EOB and was able to stand with min A x2 and HHA for safety. Pt demo'd good standing balance and was safe to use a rwx to take a few steps to the chair. Pt required max cues for sequencing of steps with intermittent manual assist to initiate forward movement of feet and total assist for advancement of rwx. Pt very unsteady and seemed confused throughout session with goal of tasks. Given frequent reminders and assist throughout for safety, but pt able to get to chair and assisted with repositioning. Pt have max difficulty following commands/visual demos for FLORINA exercises - AAROM-PROM for all with pt reporting 9/10 pain at end of session, though seeming in no distress. Pt left with all needs met and RN updated. PT will continue to follow to progress mobility as tolerated. Anticipate D/C to SNF.

## 2022-09-14 NOTE — PROGRESS NOTES
Patient resting comfortably in bed.  Hemoglobin 9.2.  Dressing clean dry and intact.  Physical therapy to begin today.  Plan rehab placement and follow-up with me in 3 to 4 weeks.

## 2022-09-15 LAB
ALBUMIN SERPL-MCNC: 3 G/DL (ref 3.5–5.2)
ALBUMIN/GLOB SERPL: 1.7 G/DL
ALP SERPL-CCNC: 98 U/L (ref 39–117)
ALT SERPL W P-5'-P-CCNC: 14 U/L (ref 1–33)
ANION GAP SERPL CALCULATED.3IONS-SCNC: 7 MMOL/L (ref 5–15)
AST SERPL-CCNC: 35 U/L (ref 1–32)
BASOPHILS # BLD AUTO: 0.03 10*3/MM3 (ref 0–0.2)
BASOPHILS NFR BLD AUTO: 0.3 % (ref 0–1.5)
BILIRUB SERPL-MCNC: 0.6 MG/DL (ref 0–1.2)
BUN SERPL-MCNC: 24 MG/DL (ref 8–23)
BUN/CREAT SERPL: 43.6 (ref 7–25)
CALCIUM SPEC-SCNC: 7.4 MG/DL (ref 8.6–10.5)
CHLORIDE SERPL-SCNC: 105 MMOL/L (ref 98–107)
CO2 SERPL-SCNC: 24 MMOL/L (ref 22–29)
CREAT SERPL-MCNC: 0.55 MG/DL (ref 0.57–1)
DEPRECATED RDW RBC AUTO: 43.4 FL (ref 37–54)
EGFRCR SERPLBLD CKD-EPI 2021: 87.7 ML/MIN/1.73
EOSINOPHIL # BLD AUTO: 0.19 10*3/MM3 (ref 0–0.4)
EOSINOPHIL NFR BLD AUTO: 1.9 % (ref 0.3–6.2)
ERYTHROCYTE [DISTWIDTH] IN BLOOD BY AUTOMATED COUNT: 12.4 % (ref 12.3–15.4)
GLOBULIN UR ELPH-MCNC: 1.8 GM/DL
GLUCOSE SERPL-MCNC: 112 MG/DL (ref 65–99)
HCT VFR BLD AUTO: 25.5 % (ref 34–46.6)
HGB BLD-MCNC: 8.4 G/DL (ref 12–15.9)
IMM GRANULOCYTES # BLD AUTO: 0.07 10*3/MM3 (ref 0–0.05)
IMM GRANULOCYTES NFR BLD AUTO: 0.7 % (ref 0–0.5)
LYMPHOCYTES # BLD AUTO: 1.16 10*3/MM3 (ref 0.7–3.1)
LYMPHOCYTES NFR BLD AUTO: 11.6 % (ref 19.6–45.3)
MCH RBC QN AUTO: 31.5 PG (ref 26.6–33)
MCHC RBC AUTO-ENTMCNC: 32.9 G/DL (ref 31.5–35.7)
MCV RBC AUTO: 95.5 FL (ref 79–97)
MONOCYTES # BLD AUTO: 0.97 10*3/MM3 (ref 0.1–0.9)
MONOCYTES NFR BLD AUTO: 9.7 % (ref 5–12)
NEUTROPHILS NFR BLD AUTO: 7.6 10*3/MM3 (ref 1.7–7)
NEUTROPHILS NFR BLD AUTO: 75.8 % (ref 42.7–76)
NRBC BLD AUTO-RTO: 0 /100 WBC (ref 0–0.2)
PLATELET # BLD AUTO: 186 10*3/MM3 (ref 140–450)
PMV BLD AUTO: 10.5 FL (ref 6–12)
POTASSIUM SERPL-SCNC: 4 MMOL/L (ref 3.5–5.2)
PROT SERPL-MCNC: 4.8 G/DL (ref 6–8.5)
RBC # BLD AUTO: 2.67 10*6/MM3 (ref 3.77–5.28)
SODIUM SERPL-SCNC: 136 MMOL/L (ref 136–145)
WBC NRBC COR # BLD: 10.02 10*3/MM3 (ref 3.4–10.8)

## 2022-09-15 PROCEDURE — 80053 COMPREHEN METABOLIC PANEL: CPT | Performed by: INTERNAL MEDICINE

## 2022-09-15 PROCEDURE — 97530 THERAPEUTIC ACTIVITIES: CPT

## 2022-09-15 PROCEDURE — 85025 COMPLETE CBC W/AUTO DIFF WBC: CPT | Performed by: INTERNAL MEDICINE

## 2022-09-15 RX ADMIN — MULTIPLE VITAMINS W/ MINERALS TAB 1 TABLET: TAB at 10:07

## 2022-09-15 RX ADMIN — ASPIRIN 81 MG: 81 TABLET, CHEWABLE ORAL at 10:07

## 2022-09-15 RX ADMIN — SODIUM CHLORIDE 75 ML/HR: 9 INJECTION, SOLUTION INTRAVENOUS at 22:01

## 2022-09-15 RX ADMIN — ASPIRIN 81 MG: 81 TABLET, CHEWABLE ORAL at 22:01

## 2022-09-15 NOTE — PLAN OF CARE
"Goal Outcome Evaluation:  Plan of Care Reviewed With: patient           Outcome Evaluation: On PT arrival pts BLEs hanging off R side of bed; alarm did not go off, RN made aware. Pt had max difficulty following commands and visual demonstrations of FLORINA exercises. Pt showed impulsive tendencies throughout session with trying to get to EOB or stand prior to set-up being completed as well as reaching out and grabbing shoulders of PT and PT tech. Given pts increased impulsiveness and decreased safety awareness, bed to chair tsf was not done. Once returned to supine and attempted FLORINA exercises, pt stating \"I'm tired\". Noted pts lunch tray still not touched, asked pt if she was hungry to which she stated \"Yes\". When setting pt up to eat she then stated \"I'm not hungry, I don't have much of an appetite\"; RN was made aware.    Patient was not wearing a face mask during this therapy encounter. Therapist used appropriate personal protective equipment including eye protection, mask, and gloves.  Mask used was standard procedure mask. Appropriate PPE was worn during the entire therapy session. Hand hygiene was completed before and after therapy session. Patient is not in enhanced droplet precautions.    "

## 2022-09-15 NOTE — CONSULTS
DIAGNOSIS:  Postop urinary retention.    HISTORY OF PRESENT ILLNESS: This patient recently underwent hip surgery for a femoral neck fracture.  She denies any voiding symptoms prior admission.  She denies past genitourinary history of stone, surgeries, or infections.  She has been in retention.  I have asked the nursing staff to place a Kramer catheter.    PAST MEDICAL SURGERY:  1.  IBS.  2.  Breast cancer.     PAST SURGICAL HISTORY:   1.  Breast lumpectomy.  2.  Eye surgery.  3.  Skin cancer excision.    PHYSICAL EXAMINATION:  GENERAL:  She is a sedated white female, unable to provide much history.  Oriented.  COR:  Regular rate and rhythm.  LUNGS:  Clear.  ABDOMEN:  Benign.    IMPRESSION:  Urinary retention.    PLAN:  This should resolve spontaneously as patient increases mobility and becomes less sedated.  I have asked the nursing staff to place a Kramer catheter.  I would leave it in for 1 week.  I have asked her to follow up with me in the office in 1 week for voiding trial.  If she is in rehab, they can certainly remove her catheter in rehab as they see fit in roughly 1 week.  I will sign off.  Please do not hesitate to call if I can be of any additional help in the meantime.

## 2022-09-15 NOTE — PLAN OF CARE
"Goal Outcome Evaluation:   Pt alert to self only, has not been voiding spontaneously since surgery, this AM agreed she could \"pee\", max assist to bedside commode, pt voided 270ml on own, bladder scan 403ml moments before bedside commode, pt not able to help much in getting out of bed, denies pain              "

## 2022-09-15 NOTE — THERAPY TREATMENT NOTE
Patient Name: Nyasia Dalal  : 1933    MRN: [id number]                              Today's Date: 9/15/2022       Admit Date: 2022    Visit Dx:     ICD-10-CM ICD-9-CM   1. Closed displaced fracture of left femoral neck (HCC)  S72.002A 820.8   2. Fall from ground level  W18.30XA E888.9     Patient Active Problem List   Diagnosis   • Osteoporosis   • Spinal stenosis of lumbar region without neurogenic claudication   • Other chronic pain   • Sacroiliac joint dysfunction of right side   • Lumbar facet arthropathy   • Closed displaced fracture of left femoral neck (HCC)     Past Medical History:   Diagnosis Date   • Breast cancer (HCC) 2014   • Irritable bowel syndrome    • Osteoporosis    • Skin cancer      Past Surgical History:   Procedure Laterality Date   • BREAST LUMPECTOMY Left    • CATARACT EXTRACTION EXTRACAPSULAR W/ INTRAOCULAR LENS IMPLANTATION Bilateral    • COLONOSCOPY     • EYE SURGERY     • SKIN CANCER EXCISION  2012   • TOTAL HIP ARTHROPLASTY Left 2022    Procedure: TOTAL HIP ARTHROPLASTY ANTERIOR WITH HANA TABLE;  Surgeon: Jm Barba II, MD;  Location: [facility];  Service: Orthopedics;  Laterality: Left;      General Information     Row Name 09/15/22 4705          Physical Therapy Time and Intention    Document Type therapy note (daily note)  -MG     Mode of Treatment individual therapy;physical therapy  -MG     Row Name 09/15/22 2219          General Information    Patient Profile Reviewed yes  -MG     Existing Precautions/Restrictions fall  -MG     Barriers to Rehab cognitive status  -MG     Row Name 09/15/22 3696          Cognition    Orientation Status (Cognition) oriented to;person;place  -MG     Row Name 09/15/22 9662          Safety Issues, Functional Mobility    Safety Issues Affecting Function (Mobility) at risk behavior observed;awareness of need for assistance;safety precaution awareness;safety precautions follow-through/compliance  -MG      "Impairments Affecting Function (Mobility) balance;cognition;endurance/activity tolerance;strength;pain;range of motion (ROM)  -MG     Cognitive Impairments, Mobility Safety/Performance attention;awareness, need for assistance;insight into deficits/self-awareness;judgment;problem-solving/reasoning;safety precaution awareness;safety precaution follow-through;sequencing abilities;impulsivity  -MG     Comment, Safety Issues/Impairments (Mobility) Non-skid socks and gait belt donned. On PT arrival pts BLEs hanging out of R side of bed. Asked pt if she was trying to get out of bed to which she replied \"Yes\". Educated pt on safety and to hit call light for help; unknown how much was retained.  -MG           User Key  (r) = Recorded By, (t) = Taken By, (c) = Cosigned By    Initials Name Provider Type    MG Enedelia Melendez, PT Physical Therapist               Mobility     Row Name 09/15/22 8686          Bed Mobility    Supine-Sit Belden (Bed Mobility) 2 person assist;verbal cues;nonverbal cues (demo/gesture);minimum assist (75% patient effort)  -MG     Assistive Device (Bed Mobility) bed rails;head of bed elevated  -MG     Comment, (Bed Mobility) Pt initiating reaching for bed rail. Merari and Max cues to bring BLEs to/off EOB. Pt reaching for PT and tech prior to sitting up. Pt was redirected to correct hand placement and was able to come to sitting w/ Merari x2.  -MG     Row Name 09/15/22 3404          Sit-Stand Transfer    Sit-Stand Belden (Transfers) minimum assist (75% patient effort);2 person assist;nonverbal cues (demo/gesture);verbal cues  -MG     Assistive Device (Sit-Stand Transfers) walker, front-wheeled  -MG     Comment, (Sit-Stand Transfer) Pt stood w/ cues for hand placement, however pt did not follow. Tried to impulsively stand prior to set-up being completed. Pt was able to stand for 1-2 min w/ cues for her head up.  -MG     Row Name 09/15/22 1544          Gait/Stairs (Locomotion)    Comment, " "(Gait/Stairs) Not attempted this date for safety and due to pts impulsivety with attempting to get OOB prior to PT arrival.  -MG     Row Name 09/15/22 6597          Mobility    Extremity Weight-bearing Status left lower extremity  -MG     Left Lower Extremity (Weight-bearing Status) weight-bearing as tolerated (WBAT)  -MG           User Key  (r) = Recorded By, (t) = Taken By, (c) = Cosigned By    Initials Name Provider Type    Enedelia White, PT Physical Therapist               Obj/Interventions     Row Name 09/15/22 5248          Motor Skills    Therapeutic Exercise other (see comments)  Sitting - AP, LAQ bilaterally x10. Supine L HS x3. Attempted QS, but pt unable to follow. Stating \"I'm tired.\"  -MG     Row Name 09/15/22 2032          Balance    Static Sitting Balance contact guard;verbal cues  -MG     Dynamic Sitting Balance contact guard;verbal cues  -MG     Position, Sitting Balance unsupported;sitting edge of bed  -MG     Static Standing Balance contact guard;minimal assist;verbal cues  -MG     Position/Device Used, Standing Balance supported;walker, front-wheeled  -MG           User Key  (r) = Recorded By, (t) = Taken By, (c) = Cosigned By    Initials Name Provider Type    Enedelia White, PT Physical Therapist               Goals/Plan    No documentation.                Clinical Impression     Row Name 09/15/22 9007          Pain    Pretreatment Pain Rating 0/10 - no pain  -MG     Posttreatment Pain Rating --  Did not rate  -MG     Pain Location - Side/Orientation Left  -MG     Pain Location incisional  -MG     Pain Location - hip  -MG     Pre/Posttreatment Pain Comment Pt stated being in no pain at beginning of session. Noted grimaces with mvmt of LLE. Asked pt to rate pain to which she was unable. Stated L hip hurt when bending her L knee.  -MG     Pain Intervention(s) Medication (See MAR);Ambulation/increased activity;Repositioned;Rest  -MG     Row Name 09/15/22 8007          Plan of Care Review    " "Plan of Care Reviewed With patient  -MG     Outcome Evaluation On PT arrival pts BLEs hanging off R side of bed; alarm did not go off, RN made aware. Pt had max difficulty following commands and visual demonstrations of FLORINA exercises. Pt showed impulsive tendencies throughout session with trying to get to EOB or stand prior to set-up being completed as well as reaching out and grabbing shoulders of PT and PT tech. Given pts increased impulsiveness and decreased safety awareness, bed to chair tsf was not done. Once returned to supine and attempted FLORINA exercises, pt stating \"I'm tired\". Noted pts lunch tray still not touched, asked pt if she was hungry to which she stated \"Yes\". When setting pt up to eat she then stated \"I'm not hungry, I don't have much of an appetite\"; RN was made aware.  -MG     Row Name 09/15/22 1358          Vital Signs    O2 Delivery Pre Treatment room air  -MG     O2 Delivery Intra Treatment room air  -MG     O2 Delivery Post Treatment room air  -MG     Pre Patient Position Supine  -MG     Intra Patient Position Standing  -MG     Post Patient Position Supine  -MG     Row Name 09/15/22 1358          Positioning and Restraints    Pre-Treatment Position in bed  -MG     Post Treatment Position bed  -MG     In Bed notified nsg;supine;fowlers;encouraged to call for assist;call light within reach;exit alarm on  -MG           User Key  (r) = Recorded By, (t) = Taken By, (c) = Cosigned By    Initials Name Provider Type    MG Enedelia Melendez, PT Physical Therapist               Outcome Measures     Row Name 09/15/22 1406 09/15/22 0800       How much help from another person do you currently need...    Turning from your back to your side while in flat bed without using bedrails? 3  -MG 2  -LR    Moving from lying on back to sitting on the side of a flat bed without bedrails? 3  -MG 2  -LR    Moving to and from a bed to a chair (including a wheelchair)? 2  -MG 2  -LR    Standing up from a chair using your " arms (e.g., wheelchair, bedside chair)? 3  -MG 3  -LR    Climbing 3-5 steps with a railing? 1  -MG 1  -LR    To walk in hospital room? 2  -MG 2  -LR    AM-PAC 6 Clicks Score (PT) 14  -MG 12  -LR    Highest level of mobility 4 --> Transferred to chair/commode  -MG 4 --> Transferred to chair/commode  -LR    Row Name 09/15/22 1406          Functional Assessment    Outcome Measure Options AM-PAC 6 Clicks Basic Mobility (PT)  -           User Key  (r) = Recorded By, (t) = Taken By, (c) = Cosigned By    Initials Name Provider Type     Enedelia Melendez, PT Physical Therapist    LR Bessie Ferreira RN Registered Nurse                             Physical Therapy Education                 Title: PT OT SLP Therapies (Done)     Topic: Physical Therapy (Done)     Point: Mobility training (Done)     Learning Progress Summary           Patient Acceptance, E, VU,NR by  at 9/15/2022 1406    Acceptance, TB,D,E, VU,NR by  at 9/14/2022 1411                   Point: Home exercise program (Done)     Learning Progress Summary           Patient Acceptance, E, VU,NR by  at 9/15/2022 1406    Acceptance, TB,D,E, VU,NR by  at 9/14/2022 1411                   Point: Body mechanics (Done)     Learning Progress Summary           Patient Acceptance, E, VU,NR by  at 9/15/2022 1406    Acceptance, TB,D,E, VU,NR by  at 9/14/2022 1411                   Point: Precautions (Done)     Learning Progress Summary           Patient Acceptance, E, VU,NR by  at 9/15/2022 1406    Acceptance, TB,D,E, VU,NR by  at 9/14/2022 1411                               User Key     Initials Effective Dates Name Provider Type Discipline     05/24/22 -  Enedelia Melendez, PT Physical Therapist PT     04/08/22 -  Sunita Mendieta PT Physical Therapist PT              PT Recommendation and Plan     Plan of Care Reviewed With: patient  Outcome Evaluation: On PT arrival pts BLEs hanging off R side of bed; alarm did not go off, RN made aware. Pt had max  "difficulty following commands and visual demonstrations of FLORINA exercises. Pt showed impulsive tendencies throughout session with trying to get to EOB or stand prior to set-up being completed as well as reaching out and grabbing shoulders of PT and PT tech. Given pts increased impulsiveness and decreased safety awareness, bed to chair tsf was not done. Once returned to supine and attempted FLORINA exercises, pt stating \"I'm tired\". Noted pts lunch tray still not touched, asked pt if she was hungry to which she stated \"Yes\". When setting pt up to eat she then stated \"I'm not hungry, I don't have much of an appetite\"; RN was made aware.     Time Calculation:    PT Charges     Row Name 09/15/22 1406             Time Calculation    Start Time 1323  -MG      Stop Time 1343  -MG      Time Calculation (min) 20 min  -MG      PT Received On 09/15/22  -MG      PT - Next Appointment 09/16/22  -MG            User Key  (r) = Recorded By, (t) = Taken By, (c) = Cosigned By    Initials Name Provider Type    MG Enedelia Melendez, PT Physical Therapist              Therapy Charges for Today     Code Description Service Date Service Provider Modifiers Qty    [id number]  PT THERAPEUTIC ACT EA 15 MIN 9/15/2022 Enedelia Melendez, PT GP 1          PT G-Codes  Outcome Measure Options: AM-PAC 6 Clicks Basic Mobility (PT)  AM-PAC 6 Clicks Score (PT): 14    Enedelia Melendez PT  9/15/2022    "

## 2022-09-15 NOTE — PROGRESS NOTES
Name: Nyasia Dalal ADMIT: 2022   : 1933  PCP: Guilherme Oglesby MD    MRN: [id number] LOS: 3 days   AGE/SEX: 89 y.o. female  ROOM: [facility]     Subjective   Subjective   Patient is seen at bedside.       Objective   Objective   Vital Signs  Temp:  [98.2 °F (36.8 °C)-99.7 °F (37.6 °C)] 98.2 °F (36.8 °C)  Heart Rate:  [85-92] 92  Resp:  [16] 16  BP: (117-152)/(71-82) 120/77  SpO2:  [92 %-95 %] 95 %  on  Flow (L/min):  [2] 2;   Device (Oxygen Therapy): nasal cannula  Body mass index is 24.14 kg/m².  Physical Exam    General, confused, appears sick on chronic basis.  Head and ENT, normocephalic and atraumatic.  Lungs, symmetric expansion, equal air entry bilaterally.  Heart, regular rate and rhythm.  Abdomen, soft and nontender.  Extremities, no clubbing or cyanosis.  Neuro, unable to fully evaluate  Skin: Warm and no rash.  Psych, unable to fully evaluate  Musculoskeletal, joint examination is grossly normal.    Results Review     I reviewed the patient's new clinical results.  Results from last 7 days   Lab Units 09/15/22  0424 09/14/22  0445 09/13/22  0532 09/12/22  1646   WBC 10*3/mm3 10.02 10.97* 16.98* 17.36*   HEMOGLOBIN g/dL 8.4* 9.2* 11.2* 12.4   PLATELETS 10*3/mm3 186 199 248 274     Results from last 7 days   Lab Units 09/15/22  0424 09/14/22  0445 09/13/22  0532 22  1646   SODIUM mmol/L 136 139 140 138   POTASSIUM mmol/L 4.0 4.1 4.1 4.1   CHLORIDE mmol/L 105 105 106 100   CO2 mmol/L 24.0 24.4 24.0 24.0   BUN mg/dL 24* 32* 26* 20   CREATININE mg/dL 0.55* 0.76 0.68 0.64   GLUCOSE mg/dL 112* 125* 123* 151*   EGFR mL/min/1.73 87.7 75.0 83.4 84.6     Results from last 7 days   Lab Units 09/15/22  0424 22  0445 22  1646   ALBUMIN g/dL 3.00* 3.40* 4.70   BILIRUBIN mg/dL 0.6 0.5 1.0   ALK PHOS U/L 98 78 108   AST (SGOT) U/L 35* 25 26   ALT (SGPT) U/L 14 13 16     Results from last 7 days   Lab Units 09/15/22  0424 22  0445 22  0532 22  1646   CALCIUM mg/dL 7.4*  7.8* 8.3* 9.1   ALBUMIN g/dL 3.00* 3.40*  --  4.70       No results found for: HGBA1C, POCGLU    No radiology results for the last day  Scheduled Medications  aspirin, 81 mg, Oral, BID  multivitamin with minerals, 1 tablet, Oral, Daily    Infusions  lactated ringers, 9 mL/hr, Last Rate: Stopped (09/15/22 1008)  sodium chloride, 75 mL/hr, Last Rate: Stopped (09/15/22 1008)    Diet  Diet Regular       Assessment/Plan     Active Hospital Problems    Diagnosis  POA   • **Closed displaced fracture of left femoral neck (HCC) [S72.002A]  Yes   • Other chronic pain [G89.29]  Yes   • Spinal stenosis of 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       89 y.o. female admitted with Closed displaced fracture of left femoral neck (HCC).    Assessment and plan  1.  Closed displaced fracture of left femoral neck, patient is status post left hip arthroplasty.  Continue pain control.  Encourage ambulation with physical therapy.     2.  Chronic pain, supportive management.     3.  Anemia, mild drop in hemoglobin noted, continue to monitor.     4.  Leukocytosis, likely reactive, continue to monitor labs.  Improvement noted.     5.  CODE STATUS is full code.  Further plans based on hospital course.       Thony Bonilla MD  [facility]  09/15/22  17:58 EDT

## 2022-09-16 LAB
ALBUMIN SERPL-MCNC: 3 G/DL (ref 3.5–5.2)
ALBUMIN/GLOB SERPL: 1.4 G/DL
ALP SERPL-CCNC: 135 U/L (ref 39–117)
ALT SERPL W P-5'-P-CCNC: 21 U/L (ref 1–33)
ANION GAP SERPL CALCULATED.3IONS-SCNC: 8.6 MMOL/L (ref 5–15)
AST SERPL-CCNC: 34 U/L (ref 1–32)
BASOPHILS # BLD AUTO: 0.02 10*3/MM3 (ref 0–0.2)
BASOPHILS NFR BLD AUTO: 0.2 % (ref 0–1.5)
BILIRUB SERPL-MCNC: 0.9 MG/DL (ref 0–1.2)
BUN SERPL-MCNC: 16 MG/DL (ref 8–23)
BUN/CREAT SERPL: 28.6 (ref 7–25)
CALCIUM SPEC-SCNC: 7.5 MG/DL (ref 8.6–10.5)
CHLORIDE SERPL-SCNC: 101 MMOL/L (ref 98–107)
CO2 SERPL-SCNC: 25.4 MMOL/L (ref 22–29)
CREAT SERPL-MCNC: 0.56 MG/DL (ref 0.57–1)
DEPRECATED RDW RBC AUTO: 40.2 FL (ref 37–54)
EGFRCR SERPLBLD CKD-EPI 2021: 87.4 ML/MIN/1.73
EOSINOPHIL # BLD AUTO: 0.09 10*3/MM3 (ref 0–0.4)
EOSINOPHIL NFR BLD AUTO: 0.8 % (ref 0.3–6.2)
ERYTHROCYTE [DISTWIDTH] IN BLOOD BY AUTOMATED COUNT: 12 % (ref 12.3–15.4)
GLOBULIN UR ELPH-MCNC: 2.2 GM/DL
GLUCOSE SERPL-MCNC: 107 MG/DL (ref 65–99)
HCT VFR BLD AUTO: 26.1 % (ref 34–46.6)
HGB BLD-MCNC: 8.9 G/DL (ref 12–15.9)
IMM GRANULOCYTES # BLD AUTO: 0.07 10*3/MM3 (ref 0–0.05)
IMM GRANULOCYTES NFR BLD AUTO: 0.6 % (ref 0–0.5)
LYMPHOCYTES # BLD AUTO: 1.24 10*3/MM3 (ref 0.7–3.1)
LYMPHOCYTES NFR BLD AUTO: 11.4 % (ref 19.6–45.3)
MCH RBC QN AUTO: 30.7 PG (ref 26.6–33)
MCHC RBC AUTO-ENTMCNC: 34.1 G/DL (ref 31.5–35.7)
MCV RBC AUTO: 90 FL (ref 79–97)
MONOCYTES # BLD AUTO: 0.98 10*3/MM3 (ref 0.1–0.9)
MONOCYTES NFR BLD AUTO: 9 % (ref 5–12)
NEUTROPHILS NFR BLD AUTO: 78 % (ref 42.7–76)
NEUTROPHILS NFR BLD AUTO: 8.5 10*3/MM3 (ref 1.7–7)
NRBC BLD AUTO-RTO: 0 /100 WBC (ref 0–0.2)
PLATELET # BLD AUTO: 206 10*3/MM3 (ref 140–450)
PMV BLD AUTO: 10.8 FL (ref 6–12)
POTASSIUM SERPL-SCNC: 3.8 MMOL/L (ref 3.5–5.2)
PROT SERPL-MCNC: 5.2 G/DL (ref 6–8.5)
RBC # BLD AUTO: 2.9 10*6/MM3 (ref 3.77–5.28)
SODIUM SERPL-SCNC: 135 MMOL/L (ref 136–145)
WBC NRBC COR # BLD: 10.9 10*3/MM3 (ref 3.4–10.8)

## 2022-09-16 PROCEDURE — 80053 COMPREHEN METABOLIC PANEL: CPT | Performed by: INTERNAL MEDICINE

## 2022-09-16 PROCEDURE — 97116 GAIT TRAINING THERAPY: CPT

## 2022-09-16 PROCEDURE — 85025 COMPLETE CBC W/AUTO DIFF WBC: CPT | Performed by: INTERNAL MEDICINE

## 2022-09-16 RX ADMIN — MULTIPLE VITAMINS W/ MINERALS TAB 1 TABLET: TAB at 09:49

## 2022-09-16 RX ADMIN — ASPIRIN 81 MG: 81 TABLET, CHEWABLE ORAL at 21:54

## 2022-09-16 RX ADMIN — ASPIRIN 81 MG: 81 TABLET, CHEWABLE ORAL at 09:49

## 2022-09-16 NOTE — THERAPY TREATMENT NOTE
Patient Name: Nyasia Dalal  : 1933    MRN: [id number]                              Today's Date: 2022       Admit Date: 2022    Visit Dx:     ICD-10-CM ICD-9-CM   1. Closed displaced fracture of left femoral neck (HCC)  S72.002A 820.8   2. Fall from ground level  W18.30XA E888.9     Patient Active Problem List   Diagnosis   • Osteoporosis   • Spinal stenosis of lumbar region without neurogenic claudication   • Other chronic pain   • Sacroiliac joint dysfunction of right side   • Lumbar facet arthropathy   • Closed displaced fracture of left femoral neck (HCC)     Past Medical History:   Diagnosis Date   • Breast cancer (HCC) 2014   • Irritable bowel syndrome    • Osteoporosis    • Skin cancer      Past Surgical History:   Procedure Laterality Date   • BREAST LUMPECTOMY Left    • CATARACT EXTRACTION EXTRACAPSULAR W/ INTRAOCULAR LENS IMPLANTATION Bilateral    • COLONOSCOPY     • EYE SURGERY     • SKIN CANCER EXCISION  2012   • TOTAL HIP ARTHROPLASTY Left 2022    Procedure: TOTAL HIP ARTHROPLASTY ANTERIOR WITH HANA TABLE;  Surgeon: Jm Barba II, MD;  Location: [facility];  Service: Orthopedics;  Laterality: Left;      General Information     Row Name 22 1155          Physical Therapy Time and Intention    Document Type therapy note (daily note)  -     Mode of Treatment individual therapy;physical therapy  -     Row Name 22 1155          General Information    Patient Profile Reviewed yes  -     Existing Precautions/Restrictions fall  -     Row Name 22 1155          Cognition    Orientation Status (Cognition) oriented to;person;place  -     Row Name 22 1155          Safety Issues, Functional Mobility    Impairments Affecting Function (Mobility) balance;cognition;endurance/activity tolerance;strength;pain;range of motion (ROM)  -           User Key  (r) = Recorded By, (t) = Taken By, (c) = Cosigned By    Initials Name Provider  Type     Sunita Mendieta PT Physical Therapist               Mobility     Row Name 09/16/22 1155          Bed Mobility    Supine-Sit [place] (Bed Mobility) verbal cues;nonverbal cues (demo/gesture);1 person assist;minimum assist (75% patient effort)  -     Assistive Device (Bed Mobility) bed rails;head of bed elevated  -     Comment, (Bed Mobility) Assist bringing trunk up to sit  -     Row Name 09/16/22 1155          Sit-Stand Transfer    Sit-Stand Gillett (Transfers) minimum assist (75% patient effort);2 person assist;nonverbal cues (demo/gesture);verbal cues  -     Assistive Device (Sit-Stand Transfers) walker, front-wheeled  -     Row Name 09/16/22 1155          Gait/Stairs (Locomotion)    Gillett Level (Gait) minimum assist (75% patient effort);moderate assist (50% patient effort);2 person assist  -     Assistive Device (Gait) walker, front-wheeled  -     Distance in Feet (Gait) 5ft to chair  -     Deviations/Abnormal Patterns (Gait) antalgic;base of support, narrow;mariah decreased;festinating/shuffling;gait speed decreased;stride length decreased;weight shifting decreased  -     Bilateral Gait Deviations forward flexed posture;heel strike decreased  -     Comment, (Gait/Stairs) Heavy cues for sequencing and initiation of steps - slow processing with intermittent ability to take a bigger step but very inconsistent. Attempted manual assist with steps, but pt with poor weightshifting ability.  -     Row Name 09/16/22 1155          Mobility    Extremity Weight-bearing Status left lower extremity  -     Left Lower Extremity (Weight-bearing Status) weight-bearing as tolerated (WBAT)  -           User Key  (r) = Recorded By, (t) = Taken By, (c) = Cosigned By    Initials Name Provider Type     Sunita Mendieta PT Physical Therapist               Obj/Interventions     Row Name 09/16/22 1157          Motor Skills    Therapeutic Exercise --  10 reps AAROM FLORINA protocol   "-           User Key  (r) = Recorded By, (t) = Taken By, (c) = Cosigned By    Initials Name Provider Type     Sunita Mendieta, PT Physical Therapist               Goals/Plan    No documentation.                Clinical Impression     [facility] Name 09/16/22 1157          Pain    Pretreatment Pain Rating 0/10 - no pain  -     Pre/Posttreatment Pain Comment No pain at rest, c/o \"a little\" pain with FLORINA exercises, but unable to rate  -     Pain Intervention(s) Repositioned;Ambulation/increased activity;Rest  -     Row Name 09/16/22 1155          Plan of Care Review    Plan of Care Reviewed With patient  -     Progress improving  -     Outcome Evaluation Pt agreeable to PT this AM, still with limited mobility 2/2 slow processing and difficulty following commands/cues. Pt transferred to EOB with min A x1 - assisting bringing trunk to sit. Pt stood with min A x2 and rwx, attempted gait but pt unable to follow commands to initiate steps. Attempted manual assist with taking a step but pt with difficulty weightshifting to allow foot to clear floor. Pt inconsistently taking a step with L foot, but R foot sliding across floor for all mobility. Chair pulled closer and pt able to pivot feet to turn to chair with min A x2 for safety. No instances of impulsivity noted today. Pt repositioned in chair and assisted with FLORINA exercises - unable to follow cues to perform independently, AAROM for all. Pt left sitting up with all needs met, RN aware. Pt will continue to benefit from skilled PT to address functional deficits and improve overall mobility. Anticipate D/C to SNF.  -     Row Name 09/16/22 1152          Therapy Assessment/Plan (PT)    Therapy Frequency (PT) 6 times/wk  -[facility] Name 09/16/22 1153          Vital Signs    O2 Delivery Pre Treatment room air  -     O2 Delivery Intra Treatment room air  -     O2 Delivery Post Treatment room air  -[facility] Name 09/16/22 1154          Positioning and Restraints    " Pre-Treatment Position in bed  -     Post Treatment Position chair  -     In Chair reclined;call light within reach;encouraged to call for assist;exit alarm on;notified [facility]           User Key  (r) = Recorded By, (t) = Taken By, (c) = Cosigned By    Initials Name Provider Type     Sunita Mendieta PT Physical Therapist               Outcome Measures     Row Name 09/16/22 1202 09/16/22 0800       How much help from another person do you currently need...    Turning from your back to your side while in flat bed without using bedrails? 3  - 2  -LR    Moving from lying on back to sitting on the side of a flat bed without bedrails? 3  - 2  -LR    Moving to and from a bed to a chair (including a wheelchair)? 2  - 2  -LR    Standing up from a chair using your arms (e.g., wheelchair, bedside chair)? 3  - 3  -LR    Climbing 3-5 steps with a railing? 1  - 1  -LR    To walk in hospital room? 2  - 2  -LR    AM-PAC 6 Clicks Score (PT) 14  - 12  -LR    Highest level of mobility 4 --> Transferred to chair/commode  - 4 --> Transferred to chair/commode  -LR    Row Name 09/16/22 1202          Functional Assessment    Outcome Measure Options AM-PAC 6 Clicks Basic Mobility (PT)  [facility]           User Key  (r) = Recorded By, (t) = Taken By, (c) = Cosigned By    Initials Name Provider Type     Sunita Mendieta PT Physical Therapist    LR Bessie Ferreira RN Registered Nurse                             Physical Therapy Education                 Title: PT OT SLP Therapies (Done)     Topic: Physical Therapy (Done)     Point: Mobility training (Done)     Learning Progress Summary           Patient Acceptance, E,TB,D, VU,NR by  at 9/16/2022 1202    Acceptance, E, VU,NR by  at 9/15/2022 1406    Acceptance, TB,D,E, VU,NR by  at 9/14/2022 1411                   Point: Home exercise program (Done)     Learning Progress Summary           Patient Acceptance, E,TB,D, VU,NR by  at 9/16/2022 1202    Acceptance, E,  VU,NR by  at 9/15/2022 1406    Acceptance, TB,D,E, VU,NR by  at 9/14/2022 1411                   Point: Body mechanics (Done)     Learning Progress Summary           Patient Acceptance, E,TB,D, VU,NR by  at 9/16/2022 1202    Acceptance, E, VU,NR by  at 9/15/2022 1406    Acceptance, TB,D,E, VU,NR by  at 9/14/2022 1411                   Point: Precautions (Done)     Learning Progress Summary           Patient Acceptance, E,TB,D, VU,NR by  at 9/16/2022 1202    Acceptance, E, VU,NR by  at 9/15/2022 1406    Acceptance, TB,D,E, VU,NR by  at 9/14/2022 1411                               User Key     Initials Effective Dates Name Provider Type Discipline     05/24/22 -  Enedelia Melendez, PT Physical Therapist PT     04/08/22 -  Sunita Mendieta PT Physical Therapist PT              PT Recommendation and Plan  Planned Therapy Interventions (PT): balance training, bed mobility training, gait training, home exercise program, patient/family education, strengthening, ROM (range of motion), transfer training  Plan of Care Reviewed With: patient  Progress: improving  Outcome Evaluation: Pt agreeable to PT this AM, still with limited mobility 2/2 slow processing and difficulty following commands/cues. Pt transferred to EOB with min A x1 - assisting bringing trunk to sit. Pt stood with min A x2 and rwx, attempted gait but pt unable to follow commands to initiate steps. Attempted manual assist with taking a step but pt with difficulty weightshifting to allow foot to clear floor. Pt inconsistently taking a step with L foot, but R foot sliding across floor for all mobility. Chair pulled closer and pt able to pivot feet to turn to chair with min A x2 for safety. No instances of impulsivity noted today. Pt repositioned in chair and assisted with FLORINA exercises - unable to follow cues to perform independently, AAROM for all. Pt left sitting up with all needs met, RN aware. Pt will continue to benefit from skilled PT to  address functional deficits and improve overall mobility. Anticipate D/C to SNF.     Time Calculation:    PT Charges     Row Name 09/16/22 1203             Time Calculation    Start Time 0947  -      Stop Time 1003  -      Time Calculation (min) 16 min  -      PT Received On 09/16/22  -      PT - Next Appointment 09/17/22  -              Time Calculation- PT    Total Timed Code Minutes- PT 16 minute(s)  -              Timed Charges    79723 - PT Therapeutic Exercise Minutes 6  -      47735 - Gait Training Minutes  10  -              Total Minutes    Timed Charges Total Minutes 16  -       Total Minutes 16  -BH            User Key  (r) = Recorded By, (t) = Taken By, (c) = Cosigned By    Initials Name Provider Type     Sunita Mednieta, PT Physical Therapist              Therapy Charges for Today     Code Description Service Date Service Provider Modifiers Qty    35849239528 HC GAIT TRAINING EA 15 MIN 9/16/2022 Sunita Mendieta, PT GP 1    71755834301 HC PT THER SUPP EA 15 MIN 9/16/2022 Sunita Mendieta, PT GP 1          PT G-Codes  Outcome Measure Options: AM-PAC 6 Clicks Basic Mobility (PT)  AM-PAC 6 Clicks Score (PT): 14    Sunita Mendieta PT  9/16/2022

## 2022-09-16 NOTE — PLAN OF CARE
Goal Outcome Evaluation:  Plan of Care Reviewed With: patient        Progress: improving  Outcome Evaluation: Pt agreeable to PT this AM, still with limited mobility 2/2 slow processing and difficulty following commands/cues. Pt transferred to EOB with min A x1 - assisting bringing trunk to sit. Pt stood with min A x2 and rwx, attempted gait but pt unable to follow commands to initiate steps. Attempted manual assist with taking a step but pt with difficulty weightshifting to allow foot to clear floor. Pt inconsistently taking a step with L foot, but R foot sliding across floor for all mobility. Chair pulled closer and pt able to pivot feet to turn to chair with min A x2 for safety. No instances of impulsivity noted today. Pt repositioned in chair and assisted with FLORINA exercises - unable to follow cues to perform independently, AAROM for all. Pt left sitting up with all needs met, RN aware. Pt will continue to benefit from skilled PT to address functional deficits and improve overall mobility. Anticipate D/C to SNF.

## 2022-09-16 NOTE — CASE MANAGEMENT/SOCIAL WORK
Continued Stay Note  [facility]     Patient Name: Nyasia Dalal  MRN: [id number]  Today's Date: 9/16/2022    Admit Date: 9/12/2022     Discharge Plan     Row Name 09/16/22 1608       Plan    Plan [facility] -- Accepted.    Patient/Family in Agreement with Plan yes    Plan Comments Spoke with Laury/Courtney who has no bed availability at [facility], the [place] [facility], [place] [facility] [place] nor [facility]. She does have a SNF bed at [facility] on Sunday. Spoke with Gracie/Amy who has accepted the patient and has a SNF bed for her tomorrow & Sunday. Gracie is requesting a Covid-19 Test prior to d/c. Updated the patient's daughter/Terrie who's agreeable and plans on [facility]. Patient will also likely need an ambulance to transport at d/c. Updated PARESH Franks who will discuss with A.               Discharge Codes    No documentation.               Expected Discharge Date and Time     Expected Discharge Date Expected Discharge Time    Sep 15, 2022             Tammy YAN RN

## 2022-09-16 NOTE — PROGRESS NOTES
"DAILY PROGRESS NOTE  [facility]    Patient Identification:  Name: Nyasia Dalal  Age: 89 y.o.  Sex: female  :  1933  MRN: [id number]         Primary Care Physician: Guilherme Oglesby MD    Subjective:  Interval History:She is weak.  Objective:    Scheduled Meds:aspirin, 81 mg, Oral, BID  multivitamin with minerals, 1 tablet, Oral, Daily      Continuous Infusions:lactated ringers, 9 mL/hr, Last Rate: Stopped (09/15/22 1008)  sodium chloride, 75 mL/hr, Last Rate: 75 mL/hr (09/15/22 2201)        Vital signs in last 24 hours:  Temp:  [97.4 °F (36.3 °C)-99.4 °F (37.4 °C)] 97.4 °F (36.3 °C)  Heart Rate:  [81-83] 81  Resp:  [16] 16  BP: (120-161)/(75-87) 161/86    Intake/Output:    Intake/Output Summary (Last 24 hours) at 2022 1233  Last data filed at 2022 1000  Gross per 24 hour   Intake 180 ml   Output 1375 ml   Net -1195 ml       Exam:  /86 (BP Location: Right arm, Patient Position: Sitting)   Pulse 81   Temp 97.4 °F (36.3 °C) (Oral)   Resp 16   Ht 157.5 cm (62\")   SpO2 94%   BMI 24.14 kg/m²     General Appearance:    Alert, cooperative, no distress   Head:    Normocephalic, without obvious abnormality, atraumatic   Eyes:       Throat:   Lips, tongue, gums normal   Neck:   Supple, symmetrical, trachea midline, no JVD   Lungs:     Clear to auscultation bilaterally, respirations unlabored   Chest Wall:    No tenderness or deformity    Heart:    Regular rate and rhythm, S1 and S2 normal, no murmur,no  Rub or gallop   Abdomen:     Soft, nontender, bowel sounds active, no masses, no organomegaly    Extremities:   Extremities normal, left hip surgical changes, no cyanosis or edema   Pulses:      Skin:   Skin is warm and dry,  no rashes or palpable lesions   Neurologic:   no focal deficits noted      Lab Results (last 72 hours)     Procedure Component Value Units Date/Time    Comprehensive Metabolic Panel [[id number]]  (Abnormal) Collected: 22 0423    Specimen: Blood " Updated: 09/16/22 0730     Glucose 107 mg/dL      BUN 16 mg/dL      Creatinine 0.56 mg/dL      Sodium 135 mmol/L      Potassium 3.8 mmol/L      Chloride 101 mmol/L      CO2 25.4 mmol/L      Calcium 7.5 mg/dL      Total Protein 5.2 g/dL      Albumin 3.00 g/dL      ALT (SGPT) 21 U/L      AST (SGOT) 34 U/L      Alkaline Phosphatase 135 U/L      Total Bilirubin 0.9 mg/dL      Globulin 2.2 gm/dL      A/G Ratio 1.4 g/dL      BUN/Creatinine Ratio 28.6     Anion Gap 8.6 mmol/L      eGFR 87.4 mL/min/1.73      Comment: National Kidney Foundation and American Society of Nephrology (ASN) Task Force recommended calculation based on the Chronic Kidney Disease Epidemiology Collaboration (CKD-EPI) equation refit without adjustment for race.       Narrative:      GFR Normal >60  Chronic Kidney Disease <60  Kidney Failure <15      CBC & Differential [[id number]]  (Abnormal) Collected: 09/16/22 0423    Specimen: Blood Updated: 09/16/22 0537    Narrative:      The following orders were created for panel order CBC & Differential.  Procedure                               Abnormality         Status                     ---------                               -----------         ------                     CBC Auto Differential[[id number]]        Abnormal            Final result                 Please view results for these tests on the individual orders.    CBC Auto Differential [[id number]]  (Abnormal) Collected: 09/16/22 0423    Specimen: Blood Updated: 09/16/22 0537     WBC 10.90 10*3/mm3      RBC 2.90 10*6/mm3      Hemoglobin 8.9 g/dL      Hematocrit 26.1 %      MCV 90.0 fL      MCH 30.7 pg      MCHC 34.1 g/dL      RDW 12.0 %      RDW-SD 40.2 fl      MPV 10.8 fL      Platelets 206 10*3/mm3      Neutrophil % 78.0 %      Lymphocyte % 11.4 %      Monocyte % 9.0 %      Eosinophil % 0.8 %      Basophil % 0.2 %      Immature Grans % 0.6 %      Neutrophils, Absolute 8.50 10*3/mm3      Lymphocytes, Absolute 1.24 10*3/mm3      Monocytes, Absolute  0.98 10*3/mm3      Eosinophils, Absolute 0.09 10*3/mm3      Basophils, Absolute 0.02 10*3/mm3      Immature Grans, Absolute 0.07 10*3/mm3      nRBC 0.0 /100 WBC     Comprehensive Metabolic Panel [[id number]]  (Abnormal) Collected: 09/15/22 0424    Specimen: Blood Updated: 09/15/22 0511     Glucose 112 mg/dL      BUN 24 mg/dL      Creatinine 0.55 mg/dL      Sodium 136 mmol/L      Potassium 4.0 mmol/L      Chloride 105 mmol/L      CO2 24.0 mmol/L      Calcium 7.4 mg/dL      Total Protein 4.8 g/dL      Albumin 3.00 g/dL      ALT (SGPT) 14 U/L      AST (SGOT) 35 U/L      Alkaline Phosphatase 98 U/L      Total Bilirubin 0.6 mg/dL      Globulin 1.8 gm/dL      A/G Ratio 1.7 g/dL      BUN/Creatinine Ratio 43.6     Anion Gap 7.0 mmol/L      eGFR 87.7 mL/min/1.73      Comment: National Kidney Foundation and American Society of Nephrology (ASN) Task Force recommended calculation based on the Chronic Kidney Disease Epidemiology Collaboration (CKD-EPI) equation refit without adjustment for race.       Narrative:      GFR Normal >60  Chronic Kidney Disease <60  Kidney Failure <15      CBC & Differential [[id number]]  (Abnormal) Collected: 09/15/22 0424    Specimen: Blood Updated: 09/15/22 0453    Narrative:      The following orders were created for panel order CBC & Differential.  Procedure                               Abnormality         Status                     ---------                               -----------         ------                     CBC Auto Differential[[id number]]        Abnormal            Final result                 Please view results for these tests on the individual orders.    CBC Auto Differential [[id number]]  (Abnormal) Collected: 09/15/22 0424    Specimen: Blood Updated: 09/15/22 0453     WBC 10.02 10*3/mm3      RBC 2.67 10*6/mm3      Hemoglobin 8.4 g/dL      Hematocrit 25.5 %      MCV 95.5 fL      MCH 31.5 pg      MCHC 32.9 g/dL      RDW 12.4 %      RDW-SD 43.4 fl      MPV 10.5 fL      Platelets 186  10*3/mm3      Neutrophil % 75.8 %      Lymphocyte % 11.6 %      Monocyte % 9.7 %      Eosinophil % 1.9 %      Basophil % 0.3 %      Immature Grans % 0.7 %      Neutrophils, Absolute 7.60 10*3/mm3      Lymphocytes, Absolute 1.16 10*3/mm3      Monocytes, Absolute 0.97 10*3/mm3      Eosinophils, Absolute 0.19 10*3/mm3      Basophils, Absolute 0.03 10*3/mm3      Immature Grans, Absolute 0.07 10*3/mm3      nRBC 0.0 /100 WBC     Urine Culture - Urine, Urine, Random Void [[id number]] Collected: 09/13/22 0106    Specimen: Urine, Random Void Updated: 09/14/22 0700     Urine Culture >100,000 CFU/mL Mixed Lesley Isolated    Narrative:      Specimen contains mixed organisms of questionable pathogenicity suggestive of contamination. If symptoms persist, suggest recollection.  Colonization of the urinary tract without infection is common. Treatment is discouraged unless the patient is symptomatic, pregnant, or undergoing an invasive urologic procedure.    CBC & Differential [[id number]]  (Abnormal) Collected: 09/14/22 0445    Specimen: Blood Updated: 09/14/22 0553    Narrative:      The following orders were created for panel order CBC & Differential.  Procedure                               Abnormality         Status                     ---------                               -----------         ------                     CBC Auto Differential[[id number]]        Abnormal            Final result                 Please view results for these tests on the individual orders.    CBC Auto Differential [[id number]]  (Abnormal) Collected: 09/14/22 0445    Specimen: Blood Updated: 09/14/22 0553     WBC 10.97 10*3/mm3      RBC 2.97 10*6/mm3      Hemoglobin 9.2 g/dL      Hematocrit 28.2 %      MCV 94.9 fL      MCH 31.0 pg      MCHC 32.6 g/dL      RDW 12.5 %      RDW-SD 43.7 fl      MPV 10.2 fL      Platelets 199 10*3/mm3      Neutrophil % 83.1 %      Lymphocyte % 9.0 %      Monocyte % 7.1 %      Eosinophil % 0.2 %      Basophil % 0.1 %       Immature Grans % 0.5 %      Neutrophils, Absolute 9.12 10*3/mm3      Lymphocytes, Absolute 0.99 10*3/mm3      Monocytes, Absolute 0.78 10*3/mm3      Eosinophils, Absolute 0.02 10*3/mm3      Basophils, Absolute 0.01 10*3/mm3      Immature Grans, Absolute 0.05 10*3/mm3      nRBC 0.0 /100 WBC     Comprehensive Metabolic Panel [[id number]]  (Abnormal) Collected: 09/14/22 0445    Specimen: Blood Updated: 09/14/22 0534     Glucose 125 mg/dL      BUN 32 mg/dL      Creatinine 0.76 mg/dL      Sodium 139 mmol/L      Potassium 4.1 mmol/L      Chloride 105 mmol/L      CO2 24.4 mmol/L      Calcium 7.8 mg/dL      Total Protein 5.4 g/dL      Albumin 3.40 g/dL      ALT (SGPT) 13 U/L      AST (SGOT) 25 U/L      Alkaline Phosphatase 78 U/L      Total Bilirubin 0.5 mg/dL      Globulin 2.0 gm/dL      A/G Ratio 1.7 g/dL      BUN/Creatinine Ratio 42.1     Anion Gap 9.6 mmol/L      eGFR 75.0 mL/min/1.73      Comment: National Kidney Foundation and American Society of Nephrology (ASN) Task Force recommended calculation based on the Chronic Kidney Disease Epidemiology Collaboration (CKD-EPI) equation refit without adjustment for race.       Narrative:      GFR Normal >60  Chronic Kidney Disease <60  Kidney Failure <15          Data Review:  Results from last 7 days   Lab Units 09/16/22  0423 09/15/22  0424 09/14/22  0445   SODIUM mmol/L 135* 136 139   POTASSIUM mmol/L 3.8 4.0 4.1   CHLORIDE mmol/L 101 105 105   CO2 mmol/L 25.4 24.0 24.4   BUN mg/dL 16 24* 32*   CREATININE mg/dL 0.56* 0.55* 0.76   GLUCOSE mg/dL 107* 112* 125*   CALCIUM mg/dL 7.5* 7.4* 7.8*     Results from last 7 days   Lab Units 09/16/22  0423 09/15/22  0424 09/14/22  0445   WBC 10*3/mm3 10.90* 10.02 10.97*   HEMOGLOBIN g/dL 8.9* 8.4* 9.2*   HEMATOCRIT % 26.1* 25.5* 28.2*   PLATELETS 10*3/mm3 206 186 199             Lab Results   Lab Value Date/Time    TROPONINT 0.039 (C) 03/13/2021 1538    TROPONINT 0.044 (C) 03/13/2021 1317         Results from last 7 days   Lab Units  09/16/22  0423 09/15/22  0424 09/14/22  0445   ALK PHOS U/L 135* 98 78   BILIRUBIN mg/dL 0.9 0.6 0.5   ALT (SGPT) U/L 21 14 13   AST (SGOT) U/L 34* 35* 25             No results found for: POCGLU        Past Medical History:   Diagnosis Date   • Breast cancer (HCC) 04/21/2014   • Irritable bowel syndrome    • Osteoporosis    • Skin cancer        Assessment:  Active Hospital Problems    Diagnosis  POA   • **Closed displaced fracture of left femoral neck (HCC) [S72.002A]  Yes   • Other chronic pain [G89.29]  Yes   • Spinal stenosis of 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       Plan:  Post op care. Up with PT . DC planning. Will need SNU for rehab.    Josue Newton MD  9/16/2022  12:33 EDT

## 2022-09-17 LAB
ANION GAP SERPL CALCULATED.3IONS-SCNC: 9.9 MMOL/L (ref 5–15)
BASOPHILS # BLD AUTO: 0.04 10*3/MM3 (ref 0–0.2)
BASOPHILS NFR BLD AUTO: 0.4 % (ref 0–1.5)
BUN SERPL-MCNC: 17 MG/DL (ref 8–23)
BUN/CREAT SERPL: 33.3 (ref 7–25)
CALCIUM SPEC-SCNC: 8.1 MG/DL (ref 8.6–10.5)
CHLORIDE SERPL-SCNC: 102 MMOL/L (ref 98–107)
CO2 SERPL-SCNC: 24.1 MMOL/L (ref 22–29)
CREAT SERPL-MCNC: 0.51 MG/DL (ref 0.57–1)
DEPRECATED RDW RBC AUTO: 42.7 FL (ref 37–54)
EGFRCR SERPLBLD CKD-EPI 2021: 89.4 ML/MIN/1.73
EOSINOPHIL # BLD AUTO: 0.18 10*3/MM3 (ref 0–0.4)
EOSINOPHIL NFR BLD AUTO: 1.7 % (ref 0.3–6.2)
ERYTHROCYTE [DISTWIDTH] IN BLOOD BY AUTOMATED COUNT: 12.4 % (ref 12.3–15.4)
GLUCOSE SERPL-MCNC: 102 MG/DL (ref 65–99)
HCT VFR BLD AUTO: 28.2 % (ref 34–46.6)
HGB BLD-MCNC: 9.4 G/DL (ref 12–15.9)
IMM GRANULOCYTES # BLD AUTO: 0.06 10*3/MM3 (ref 0–0.05)
IMM GRANULOCYTES NFR BLD AUTO: 0.6 % (ref 0–0.5)
LYMPHOCYTES # BLD AUTO: 1.36 10*3/MM3 (ref 0.7–3.1)
LYMPHOCYTES NFR BLD AUTO: 13.2 % (ref 19.6–45.3)
MCH RBC QN AUTO: 31.1 PG (ref 26.6–33)
MCHC RBC AUTO-ENTMCNC: 33.3 G/DL (ref 31.5–35.7)
MCV RBC AUTO: 93.4 FL (ref 79–97)
MONOCYTES # BLD AUTO: 0.92 10*3/MM3 (ref 0.1–0.9)
MONOCYTES NFR BLD AUTO: 8.9 % (ref 5–12)
NEUTROPHILS NFR BLD AUTO: 7.77 10*3/MM3 (ref 1.7–7)
NEUTROPHILS NFR BLD AUTO: 75.2 % (ref 42.7–76)
NRBC BLD AUTO-RTO: 0.1 /100 WBC (ref 0–0.2)
PLATELET # BLD AUTO: 259 10*3/MM3 (ref 140–450)
PMV BLD AUTO: 10.3 FL (ref 6–12)
POTASSIUM SERPL-SCNC: 3.7 MMOL/L (ref 3.5–5.2)
RBC # BLD AUTO: 3.02 10*6/MM3 (ref 3.77–5.28)
SARS-COV-2 RNA RESP QL NAA+PROBE: NOT DETECTED
SODIUM SERPL-SCNC: 136 MMOL/L (ref 136–145)
WBC NRBC COR # BLD: 10.33 10*3/MM3 (ref 3.4–10.8)

## 2022-09-17 PROCEDURE — 85025 COMPLETE CBC W/AUTO DIFF WBC: CPT | Performed by: HOSPITALIST

## 2022-09-17 PROCEDURE — 80048 BASIC METABOLIC PNL TOTAL CA: CPT | Performed by: HOSPITALIST

## 2022-09-17 PROCEDURE — U0005 INFEC AGEN DETEC AMPLI PROBE: HCPCS | Performed by: HOSPITALIST

## 2022-09-17 PROCEDURE — U0003 INFECTIOUS AGENT DETECTION BY NUCLEIC ACID (DNA OR RNA); SEVERE ACUTE RESPIRATORY SYNDROME CORONAVIRUS 2 (SARS-COV-2) (CORONAVIRUS DISEASE [COVID-19]), AMPLIFIED PROBE TECHNIQUE, MAKING USE OF HIGH THROUGHPUT TECHNOLOGIES AS DESCRIBED BY CMS-2020-01-R: HCPCS | Performed by: HOSPITALIST

## 2022-09-17 RX ORDER — ACETAMINOPHEN 325 MG/1
650 TABLET ORAL EVERY 4 HOURS PRN
Start: 2022-09-17

## 2022-09-17 RX ORDER — ASPIRIN 81 MG/1
81 TABLET, CHEWABLE ORAL 2 TIMES DAILY
Start: 2022-09-17

## 2022-09-17 RX ORDER — HYDROCODONE BITARTRATE AND ACETAMINOPHEN 5; 325 MG/1; MG/1
1 TABLET ORAL EVERY 4 HOURS PRN
Qty: 20 TABLET | Refills: 0 | Status: SHIPPED | OUTPATIENT
Start: 2022-09-17 | End: 2022-09-22

## 2022-09-17 RX ADMIN — MULTIPLE VITAMINS W/ MINERALS TAB 1 TABLET: TAB at 09:24

## 2022-09-17 RX ADMIN — ASPIRIN 81 MG: 81 TABLET, CHEWABLE ORAL at 20:19

## 2022-09-17 RX ADMIN — ASPIRIN 81 MG: 81 TABLET, CHEWABLE ORAL at 09:24

## 2022-09-17 NOTE — PROGRESS NOTES
Continued Stay Note  [facility]     Patient Name: Nyasia Dalal  MRN: [id number]  Today's Date: 9/17/2022    Admit Date: 9/12/2022     Discharge Plan     Row Name 09/17/22 1526       Plan    Plan [address] 9 am  EMS to transport               Discharge Codes    No documentation.               Expected Discharge Date and Time     Expected Discharge Date Expected Discharge Time    Sep 17, 2022             Arianne Delcid RN

## 2022-09-17 NOTE — PROGRESS NOTES
"DAILY PROGRESS NOTE  [facility]    Patient Identification:  Name: Nyasia Dalal  Age: 89 y.o.  Sex: female  :  1933  MRN: [id number]         Primary Care Physician: Guilherme Oglesby MD    Subjective:  Interval History:She is weak.  Objective:    Scheduled Meds:aspirin, 81 mg, Oral, BID  multivitamin with minerals, 1 tablet, Oral, Daily      Continuous Infusions:lactated ringers, 9 mL/hr, Last Rate: Stopped (09/15/22 1008)  sodium chloride, 75 mL/hr, Last Rate: 75 mL/hr (09/15/22 2201)        Vital signs in last 24 hours:  Temp:  [97.6 °F (36.4 °C)-98.7 °F (37.1 °C)] 98.3 °F (36.8 °C)  Heart Rate:  [75-94] 82  Resp:  [16] 16  BP: (129-174)/(74-89) 158/85    Intake/Output:    Intake/Output Summary (Last 24 hours) at 2022 1232  Last data filed at 2022 0551  Gross per 24 hour   Intake 220 ml   Output 950 ml   Net -730 ml       Exam:  /85 (BP Location: Right arm, Patient Position: Lying)   Pulse 82   Temp 98.3 °F (36.8 °C) (Oral)   Resp 16   Ht 157.5 cm (62\")   SpO2 94%   BMI 24.14 kg/m²     General Appearance:    Alert, cooperative, no distress   Head:    Normocephalic, without obvious abnormality, atraumatic   Eyes:       Throat:   Lips, tongue, gums normal   Neck:   Supple, symmetrical, trachea midline, no JVD   Lungs:     Clear to auscultation bilaterally, respirations unlabored   Chest Wall:    No tenderness or deformity    Heart:    Regular rate and rhythm, S1 and S2 normal, no murmur,no  Rub or gallop   Abdomen:     Soft, nontender, bowel sounds active, no masses, no organomegaly    Extremities:   Extremities normal, left hip surgical changes, no cyanosis or edema   Pulses:      Skin:   Skin is warm and dry,  no rashes or palpable lesions   Neurologic:   no focal deficits noted      Lab Results (last 72 hours)     Procedure Component Value Units Date/Time    Comprehensive Metabolic Panel [[id number]]  (Abnormal) Collected: 22 0423    Specimen: Blood Updated: " 09/16/22 0730     Glucose 107 mg/dL      BUN 16 mg/dL      Creatinine 0.56 mg/dL      Sodium 135 mmol/L      Potassium 3.8 mmol/L      Chloride 101 mmol/L      CO2 25.4 mmol/L      Calcium 7.5 mg/dL      Total Protein 5.2 g/dL      Albumin 3.00 g/dL      ALT (SGPT) 21 U/L      AST (SGOT) 34 U/L      Alkaline Phosphatase 135 U/L      Total Bilirubin 0.9 mg/dL      Globulin 2.2 gm/dL      A/G Ratio 1.4 g/dL      BUN/Creatinine Ratio 28.6     Anion Gap 8.6 mmol/L      eGFR 87.4 mL/min/1.73      Comment: National Kidney Foundation and American Society of Nephrology (ASN) Task Force recommended calculation based on the Chronic Kidney Disease Epidemiology Collaboration (CKD-EPI) equation refit without adjustment for race.       Narrative:      GFR Normal >60  Chronic Kidney Disease <60  Kidney Failure <15      CBC & Differential [[id number]]  (Abnormal) Collected: 09/16/22 0423    Specimen: Blood Updated: 09/16/22 0537    Narrative:      The following orders were created for panel order CBC & Differential.  Procedure                               Abnormality         Status                     ---------                               -----------         ------                     CBC Auto Differential[[id number]]        Abnormal            Final result                 Please view results for these tests on the individual orders.    CBC Auto Differential [[id number]]  (Abnormal) Collected: 09/16/22 0423    Specimen: Blood Updated: 09/16/22 0537     WBC 10.90 10*3/mm3      RBC 2.90 10*6/mm3      Hemoglobin 8.9 g/dL      Hematocrit 26.1 %      MCV 90.0 fL      MCH 30.7 pg      MCHC 34.1 g/dL      RDW 12.0 %      RDW-SD 40.2 fl      MPV 10.8 fL      Platelets 206 10*3/mm3      Neutrophil % 78.0 %      Lymphocyte % 11.4 %      Monocyte % 9.0 %      Eosinophil % 0.8 %      Basophil % 0.2 %      Immature Grans % 0.6 %      Neutrophils, Absolute 8.50 10*3/mm3      Lymphocytes, Absolute 1.24 10*3/mm3      Monocytes, Absolute 0.98  10*3/mm3      Eosinophils, Absolute 0.09 10*3/mm3      Basophils, Absolute 0.02 10*3/mm3      Immature Grans, Absolute 0.07 10*3/mm3      nRBC 0.0 /100 WBC     Comprehensive Metabolic Panel [[id number]]  (Abnormal) Collected: 09/15/22 0424    Specimen: Blood Updated: 09/15/22 0511     Glucose 112 mg/dL      BUN 24 mg/dL      Creatinine 0.55 mg/dL      Sodium 136 mmol/L      Potassium 4.0 mmol/L      Chloride 105 mmol/L      CO2 24.0 mmol/L      Calcium 7.4 mg/dL      Total Protein 4.8 g/dL      Albumin 3.00 g/dL      ALT (SGPT) 14 U/L      AST (SGOT) 35 U/L      Alkaline Phosphatase 98 U/L      Total Bilirubin 0.6 mg/dL      Globulin 1.8 gm/dL      A/G Ratio 1.7 g/dL      BUN/Creatinine Ratio 43.6     Anion Gap 7.0 mmol/L      eGFR 87.7 mL/min/1.73      Comment: National Kidney Foundation and American Society of Nephrology (ASN) Task Force recommended calculation based on the Chronic Kidney Disease Epidemiology Collaboration (CKD-EPI) equation refit without adjustment for race.       Narrative:      GFR Normal >60  Chronic Kidney Disease <60  Kidney Failure <15      CBC & Differential [[id number]]  (Abnormal) Collected: 09/15/22 0424    Specimen: Blood Updated: 09/15/22 0453    Narrative:      The following orders were created for panel order CBC & Differential.  Procedure                               Abnormality         Status                     ---------                               -----------         ------                     CBC Auto Differential[[id number]]        Abnormal            Final result                 Please view results for these tests on the individual orders.    CBC Auto Differential [[id number]]  (Abnormal) Collected: 09/15/22 0424    Specimen: Blood Updated: 09/15/22 0453     WBC 10.02 10*3/mm3      RBC 2.67 10*6/mm3      Hemoglobin 8.4 g/dL      Hematocrit 25.5 %      MCV 95.5 fL      MCH 31.5 pg      MCHC 32.9 g/dL      RDW 12.4 %      RDW-SD 43.4 fl      MPV 10.5 fL      Platelets 186  10*3/mm3      Neutrophil % 75.8 %      Lymphocyte % 11.6 %      Monocyte % 9.7 %      Eosinophil % 1.9 %      Basophil % 0.3 %      Immature Grans % 0.7 %      Neutrophils, Absolute 7.60 10*3/mm3      Lymphocytes, Absolute 1.16 10*3/mm3      Monocytes, Absolute 0.97 10*3/mm3      Eosinophils, Absolute 0.19 10*3/mm3      Basophils, Absolute 0.03 10*3/mm3      Immature Grans, Absolute 0.07 10*3/mm3      nRBC 0.0 /100 WBC     Urine Culture - Urine, Urine, Random Void [[id number]] Collected: 09/13/22 0106    Specimen: Urine, Random Void Updated: 09/14/22 0700     Urine Culture >100,000 CFU/mL Mixed Lesley Isolated    Narrative:      Specimen contains mixed organisms of questionable pathogenicity suggestive of contamination. If symptoms persist, suggest recollection.  Colonization of the urinary tract without infection is common. Treatment is discouraged unless the patient is symptomatic, pregnant, or undergoing an invasive urologic procedure.    CBC & Differential [[id number]]  (Abnormal) Collected: 09/14/22 0445    Specimen: Blood Updated: 09/14/22 0553    Narrative:      The following orders were created for panel order CBC & Differential.  Procedure                               Abnormality         Status                     ---------                               -----------         ------                     CBC Auto Differential[[id number]]        Abnormal            Final result                 Please view results for these tests on the individual orders.    CBC Auto Differential [[id number]]  (Abnormal) Collected: 09/14/22 0445    Specimen: Blood Updated: 09/14/22 0553     WBC 10.97 10*3/mm3      RBC 2.97 10*6/mm3      Hemoglobin 9.2 g/dL      Hematocrit 28.2 %      MCV 94.9 fL      MCH 31.0 pg      MCHC 32.6 g/dL      RDW 12.5 %      RDW-SD 43.7 fl      MPV 10.2 fL      Platelets 199 10*3/mm3      Neutrophil % 83.1 %      Lymphocyte % 9.0 %      Monocyte % 7.1 %      Eosinophil % 0.2 %      Basophil % 0.1 %       Immature Grans % 0.5 %      Neutrophils, Absolute 9.12 10*3/mm3      Lymphocytes, Absolute 0.99 10*3/mm3      Monocytes, Absolute 0.78 10*3/mm3      Eosinophils, Absolute 0.02 10*3/mm3      Basophils, Absolute 0.01 10*3/mm3      Immature Grans, Absolute 0.05 10*3/mm3      nRBC 0.0 /100 WBC     Comprehensive Metabolic Panel [[id number]]  (Abnormal) Collected: 09/14/22 0445    Specimen: Blood Updated: 09/14/22 0534     Glucose 125 mg/dL      BUN 32 mg/dL      Creatinine 0.76 mg/dL      Sodium 139 mmol/L      Potassium 4.1 mmol/L      Chloride 105 mmol/L      CO2 24.4 mmol/L      Calcium 7.8 mg/dL      Total Protein 5.4 g/dL      Albumin 3.40 g/dL      ALT (SGPT) 13 U/L      AST (SGOT) 25 U/L      Alkaline Phosphatase 78 U/L      Total Bilirubin 0.5 mg/dL      Globulin 2.0 gm/dL      A/G Ratio 1.7 g/dL      BUN/Creatinine Ratio 42.1     Anion Gap 9.6 mmol/L      eGFR 75.0 mL/min/1.73      Comment: National Kidney Foundation and American Society of Nephrology (ASN) Task Force recommended calculation based on the Chronic Kidney Disease Epidemiology Collaboration (CKD-EPI) equation refit without adjustment for race.       Narrative:      GFR Normal >60  Chronic Kidney Disease <60  Kidney Failure <15          Data Review:  Results from last 7 days   Lab Units 09/17/22  0428 09/16/22  0423 09/15/22  0424   SODIUM mmol/L 136 135* 136   POTASSIUM mmol/L 3.7 3.8 4.0   CHLORIDE mmol/L 102 101 105   CO2 mmol/L 24.1 25.4 24.0   BUN mg/dL 17 16 24*   CREATININE mg/dL 0.51* 0.56* 0.55*   GLUCOSE mg/dL 102* 107* 112*   CALCIUM mg/dL 8.1* 7.5* 7.4*     Results from last 7 days   Lab Units 09/17/22  0428 09/16/22  0423 09/15/22  0424   WBC 10*3/mm3 10.33 10.90* 10.02   HEMOGLOBIN g/dL 9.4* 8.9* 8.4*   HEMATOCRIT % 28.2* 26.1* 25.5*   PLATELETS 10*3/mm3 259 206 186             Lab Results   Lab Value Date/Time    TROPONINT 0.039 (C) 03/13/2021 1538    TROPONINT 0.044 (C) 03/13/2021 1317         Results from last 7 days   Lab Units  09/16/22  0423 09/15/22  0424 09/14/22  0445   ALK PHOS U/L 135* 98 78   BILIRUBIN mg/dL 0.9 0.6 0.5   ALT (SGPT) U/L 21 14 13   AST (SGOT) U/L 34* 35* 25             No results found for: POCGLU        Past Medical History:   Diagnosis Date   • Breast cancer (HCC) 04/21/2014   • Irritable bowel syndrome    • Osteoporosis    • Skin cancer        Assessment:  Active Hospital Problems    Diagnosis  POA   • **Closed displaced fracture of left femoral neck (HCC) [S72.002A]  Yes   • Other chronic pain [G89.29]  Yes   • Spinal stenosis of 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       Plan:  Post op care. Up with PT . DC planning. Will need SNU for rehab.  Possibly today or tomorrow depending on availability of ambulance transportation.    Josue Newton MD  9/17/2022  12:32 EDT

## 2022-09-17 NOTE — DISCHARGE SUMMARY
PHYSICIAN DISCHARGE SUMMARY                                                                        [facility]    Patient Identification:  Name: Nyasia Dalal  Age: 89 y.o.  Sex: female  :  1933  MRN: [id number]  Primary Care Physician: Guilherme Oglesby MD    Admit date: 2022  Discharge date and time:2022  Discharged Condition: good    Discharge Diagnoses:  Active Hospital Problems    Diagnosis  POA   • **Closed displaced fracture of left femoral neck (HCC) [S72.002A]  Yes   • Other chronic pain [G89.29]  Yes   • Spinal stenosis of 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          PMHX:   Past Medical History:   Diagnosis Date   • Breast cancer (HCC) 2014   • Irritable bowel syndrome    • Osteoporosis    • Skin cancer      PSHX:   Past Surgical History:   Procedure Laterality Date   • BREAST LUMPECTOMY Left    • CATARACT EXTRACTION EXTRACAPSULAR W/ INTRAOCULAR LENS IMPLANTATION Bilateral    • COLONOSCOPY     • EYE SURGERY     • SKIN CANCER EXCISION  2012   • TOTAL HIP ARTHROPLASTY Left 2022    Procedure: TOTAL HIP ARTHROPLASTY ANTERIOR WITH HANA TABLE;  Surgeon: Jm Barba II, MD;  Location: [facility];  Service: Orthopedics;  Laterality: Left;       Hospital Course: Nyasia Dalal  Is a 89 year old female who recently moved to an assisted living facility; she came in after a fall; she was found on the floor by staff; she is unsure what happened; she has had several recent falls according to family; she is not able to answer questions well but this is not new according to the daughter who is present.  The patient was admitted to the hospital and seen by orthopedic surgery.  The patient had surgery for hip replacement.  The patient was pretty weak after surgery and the  plan is to go to a skilled nursing facility for rehab.  She will follow-up with her primary care after released from rehab.  She will also follow-up with orthopedic surgery for postop check.      Consults:     Consults     Date and Time Order Name Status Description    9/15/2022  4:34 AM Inpatient Urology Consult Completed     9/12/2022  5:29 PM Inpatient Orthopedic Surgery Consult Completed     9/12/2022  5:06 PM [facility] (on-call MD unless specified) Details      9/12/2022  5:06 PM Ortho (on-call MD unless specified)          Results from last 7 days   Lab Units 09/17/22  0428   WBC 10*3/mm3 10.33   HEMOGLOBIN g/dL 9.4*   HEMATOCRIT % 28.2*   PLATELETS 10*3/mm3 259     Results from last 7 days   Lab Units 09/17/22  0428   SODIUM mmol/L 136   POTASSIUM mmol/L 3.7   CHLORIDE mmol/L 102   CO2 mmol/L 24.1   BUN mg/dL 17   CREATININE mg/dL 0.51*   GLUCOSE mg/dL 102*   CALCIUM mg/dL 8.1*     Significant Diagnostic Studies:   WBC   Date Value Ref Range Status   09/17/2022 10.33 3.40 - 10.80 10*3/mm3 Final     Hemoglobin   Date Value Ref Range Status   09/17/2022 9.4 (L) 12.0 - 15.9 g/dL Final     Hematocrit   Date Value Ref Range Status   09/17/2022 28.2 (L) 34.0 - 46.6 % Final     Platelets   Date Value Ref Range Status   09/17/2022 259 140 - 450 10*3/mm3 Final     Sodium   Date Value Ref Range Status   09/17/2022 136 136 - 145 mmol/L Final     Potassium   Date Value Ref Range Status   09/17/2022 3.7 3.5 - 5.2 mmol/L Final     Chloride   Date Value Ref Range Status   09/17/2022 102 98 - 107 mmol/L Final     CO2   Date Value Ref Range Status   09/17/2022 24.1 22.0 - 29.0 mmol/L Final     BUN   Date Value Ref Range Status   09/17/2022 17 8 - 23 mg/dL Final     Creatinine   Date Value Ref Range Status   09/17/2022 0.51 (L) 0.57 - 1.00 mg/dL Final     Glucose   Date Value Ref Range Status   09/17/2022 102 (H) 65 - 99 mg/dL Final     Calcium   Date Value Ref Range Status   09/17/2022 8.1 (L) 8.6 - 10.5 mg/dL Final     AST (SGOT)    Date Value Ref Range Status   09/16/2022 34 (H) 1 - 32 U/L Final     ALT (SGPT)   Date Value Ref Range Status   09/16/2022 21 1 - 33 U/L Final     Alkaline Phosphatase   Date Value Ref Range Status   09/16/2022 135 (H) 39 - 117 U/L Final     No results found for: APTT, INR  No results found for: COLORU, CLARITYU, SPECGRAV, PHUR, PROTEINUR, GLUCOSEU, KETONESU, BLOODU, NITRITE, LEUKOCYTESUR, BILIRUBINUR, UROBILINOGEN, RBCUA, WBCUA, BACTERIA, UACOMMENT  No results found for: TROPONINT, TROPONINI, BNP  No components found for: HGBA1C;2  No components found for: TSH;2  Imaging Results (All)     Procedure Component Value Units Date/Time    XR Hip With or Without Pelvis 1 View Left [[id number]] Collected: 09/13/22 0916     Updated: 09/13/22 1420    Narrative:      XR HIP WITH OR WITHOUT PELVIS 1 VIEW LEFT-  INTRAOPERATIVE VIEWS  09/13/2022     HISTORY: Left hip arthroplasty.     Intraoperative views were used for localization during left hip  arthroplasty. The acetabular and proximal femoral components are  well-seated. No unexpected findings are noted.     Fluoroscopy time 6 seconds, 1 image.     This report was finalized on 9/13/2022 2:17 PM by Dr. Juan Manuel Chow M.D.       XR Pelvis 1 or 2 View [[id number]] Collected: 09/13/22 0859     Updated: 09/13/22 0903    Narrative:      XR PELVIS 1 OR 2 VW-  09/13/2022     HISTORY: Postop left hip arthroplasty.     The acetabular and proximal femoral components of the left hip  prosthesis are well-seated with no abnormal surrounding bony lucencies.  Soft tissue air and skin staples are seen.     No unexpected findings are noted.     There is moderate degenerative arthritis of the right hip.       Impression:      1. Satisfactory postoperative appearance of the left hip.     This report was finalized on 9/13/2022 9:00 AM by Dr. Juan Manuel Chow M.D.       FL C Arm During Surgery [[id number]] Resulted: 09/13/22 0808     Updated: 09/13/22 0808    Narrative:      This procedure  was auto-finalized with no dictation required.    CT Head Without Contrast [[id number]] Collected: 09/12/22 1655     Updated: 09/12/22 1904    Narrative:      CT SCAN OF THE BRAIN WITHOUT CONTRAST     HISTORY: Recent fall. Possible head trauma. Confusion.     The CT scan was performed as an emergency procedure through the brain  without contrast. There is prominent diffuse atrophy and chronic small  vessel ischemic change similar to the study of 05/16/2022. There is no  evidence of acute intracranial hemorrhage or mass effect. The visualized  sinuses and mastoid air cells are clear.                 Radiation dose reduction techniques were utilized, including automated  exposure control and exposure modulation based on body size.     This report was finalized on 9/12/2022 7:01 PM by Dr. Syed Guzman M.D.       XR Hip With or Without Pelvis 2 - 3 View Left [[id number]] Collected: 09/12/22 1640     Updated: 09/12/22 1645    Narrative:      TWO-VIEW LEFT HIP AND ONE VIEW AP PELVIS     HISTORY: Fell. Hip pain.     FINDINGS: There is an acute fracture of the left femoral neck with  superior displacement of the femur relative to the femoral head.     ONE VIEW SUPINE CHEST     HISTORY: Preop for hip surgery. Hip fracture. Breast cancer.     FINDINGS: There is mild cardiomegaly unchanged from 01/23/2014. The  lungs are clear with some minimal upper lobe vascular prominence. There  is no focal infiltrate.     This report was finalized on 9/12/2022 4:41 PM by Dr. Syed Guzman M.D.       XR Chest 1 View [[id number]] Collected: 09/12/22 1640     Updated: 09/12/22 1645    Narrative:      TWO-VIEW LEFT HIP AND ONE VIEW AP PELVIS     HISTORY: Fell. Hip pain.     FINDINGS: There is an acute fracture of the left femoral neck with  superior displacement of the femur relative to the femoral head.     ONE VIEW SUPINE CHEST     HISTORY: Preop for hip surgery. Hip fracture. Breast cancer.     FINDINGS: There is mild cardiomegaly  unchanged from 01/23/2014. The  lungs are clear with some minimal upper lobe vascular prominence. There  is no focal infiltrate.     This report was finalized on 9/12/2022 4:41 PM by Dr. Syed Guzman M.D.           Lab Results (last 7 days)     Procedure Component Value Units Date/Time    Basic Metabolic Panel [[id number]]  (Abnormal) Collected: 09/17/22 0428    Specimen: Blood Updated: 09/17/22 0611     Glucose 102 mg/dL      BUN 17 mg/dL      Creatinine 0.51 mg/dL      Sodium 136 mmol/L      Potassium 3.7 mmol/L      Chloride 102 mmol/L      CO2 24.1 mmol/L      Calcium 8.1 mg/dL      BUN/Creatinine Ratio 33.3     Anion Gap 9.9 mmol/L      eGFR 89.4 mL/min/1.73      Comment: National Kidney Foundation and American Society of Nephrology (ASN) Task Force recommended calculation based on the Chronic Kidney Disease Epidemiology Collaboration (CKD-EPI) equation refit without adjustment for race.       Narrative:      GFR Normal >60  Chronic Kidney Disease <60  Kidney Failure <15      CBC & Differential [[id number]]  (Abnormal) Collected: 09/17/22 0428    Specimen: Blood Updated: 09/17/22 0519    Narrative:      The following orders were created for panel order CBC & Differential.  Procedure                               Abnormality         Status                     ---------                               -----------         ------                     CBC Auto Differential[[id number]]        Abnormal            Final result                 Please view results for these tests on the individual orders.    CBC Auto Differential [[id number]]  (Abnormal) Collected: 09/17/22 0428    Specimen: Blood Updated: 09/17/22 0519     WBC 10.33 10*3/mm3      RBC 3.02 10*6/mm3      Hemoglobin 9.4 g/dL      Hematocrit 28.2 %      MCV 93.4 fL      MCH 31.1 pg      MCHC 33.3 g/dL      RDW 12.4 %      RDW-SD 42.7 fl      MPV 10.3 fL      Platelets 259 10*3/mm3      Neutrophil % 75.2 %      Lymphocyte % 13.2 %      Monocyte % 8.9 %       Eosinophil % 1.7 %      Basophil % 0.4 %      Immature Grans % 0.6 %      Neutrophils, Absolute 7.77 10*3/mm3      Lymphocytes, Absolute 1.36 10*3/mm3      Monocytes, Absolute 0.92 10*3/mm3      Eosinophils, Absolute 0.18 10*3/mm3      Basophils, Absolute 0.04 10*3/mm3      Immature Grans, Absolute 0.06 10*3/mm3      nRBC 0.1 /100 WBC     Comprehensive Metabolic Panel [[id number]]  (Abnormal) Collected: 09/16/22 0423    Specimen: Blood Updated: 09/16/22 0730     Glucose 107 mg/dL      BUN 16 mg/dL      Creatinine 0.56 mg/dL      Sodium 135 mmol/L      Potassium 3.8 mmol/L      Chloride 101 mmol/L      CO2 25.4 mmol/L      Calcium 7.5 mg/dL      Total Protein 5.2 g/dL      Albumin 3.00 g/dL      ALT (SGPT) 21 U/L      AST (SGOT) 34 U/L      Alkaline Phosphatase 135 U/L      Total Bilirubin 0.9 mg/dL      Globulin 2.2 gm/dL      A/G Ratio 1.4 g/dL      BUN/Creatinine Ratio 28.6     Anion Gap 8.6 mmol/L      eGFR 87.4 mL/min/1.73      Comment: National Kidney Foundation and American Society of Nephrology (ASN) Task Force recommended calculation based on the Chronic Kidney Disease Epidemiology Collaboration (CKD-EPI) equation refit without adjustment for race.       Narrative:      GFR Normal >60  Chronic Kidney Disease <60  Kidney Failure <15      CBC & Differential [[id number]]  (Abnormal) Collected: 09/16/22 0423    Specimen: Blood Updated: 09/16/22 0537    Narrative:      The following orders were created for panel order CBC & Differential.  Procedure                               Abnormality         Status                     ---------                               -----------         ------                     CBC Auto Differential[[id number]]        Abnormal            Final result                 Please view results for these tests on the individual orders.    CBC Auto Differential [[id number]]  (Abnormal) Collected: 09/16/22 0423    Specimen: Blood Updated: 09/16/22 0537     WBC 10.90 10*3/mm3      RBC 2.90  10*6/mm3      Hemoglobin 8.9 g/dL      Hematocrit 26.1 %      MCV 90.0 fL      MCH 30.7 pg      MCHC 34.1 g/dL      RDW 12.0 %      RDW-SD 40.2 fl      MPV 10.8 fL      Platelets 206 10*3/mm3      Neutrophil % 78.0 %      Lymphocyte % 11.4 %      Monocyte % 9.0 %      Eosinophil % 0.8 %      Basophil % 0.2 %      Immature Grans % 0.6 %      Neutrophils, Absolute 8.50 10*3/mm3      Lymphocytes, Absolute 1.24 10*3/mm3      Monocytes, Absolute 0.98 10*3/mm3      Eosinophils, Absolute 0.09 10*3/mm3      Basophils, Absolute 0.02 10*3/mm3      Immature Grans, Absolute 0.07 10*3/mm3      nRBC 0.0 /100 WBC     Comprehensive Metabolic Panel [[id number]]  (Abnormal) Collected: 09/15/22 0424    Specimen: Blood Updated: 09/15/22 0511     Glucose 112 mg/dL      BUN 24 mg/dL      Creatinine 0.55 mg/dL      Sodium 136 mmol/L      Potassium 4.0 mmol/L      Chloride 105 mmol/L      CO2 24.0 mmol/L      Calcium 7.4 mg/dL      Total Protein 4.8 g/dL      Albumin 3.00 g/dL      ALT (SGPT) 14 U/L      AST (SGOT) 35 U/L      Alkaline Phosphatase 98 U/L      Total Bilirubin 0.6 mg/dL      Globulin 1.8 gm/dL      A/G Ratio 1.7 g/dL      BUN/Creatinine Ratio 43.6     Anion Gap 7.0 mmol/L      eGFR 87.7 mL/min/1.73      Comment: National Kidney Foundation and American Society of Nephrology (ASN) Task Force recommended calculation based on the Chronic Kidney Disease Epidemiology Collaboration (CKD-EPI) equation refit without adjustment for race.       Narrative:      GFR Normal >60  Chronic Kidney Disease <60  Kidney Failure <15      CBC & Differential [[id number]]  (Abnormal) Collected: 09/15/22 0424    Specimen: Blood Updated: 09/15/22 0453    Narrative:      The following orders were created for panel order CBC & Differential.  Procedure                               Abnormality         Status                     ---------                               -----------         ------                     CBC Auto Differential[[id number]]         Abnormal            Final result                 Please view results for these tests on the individual orders.    CBC Auto Differential [[id number]]  (Abnormal) Collected: 09/15/22 0424    Specimen: Blood Updated: 09/15/22 0453     WBC 10.02 10*3/mm3      RBC 2.67 10*6/mm3      Hemoglobin 8.4 g/dL      Hematocrit 25.5 %      MCV 95.5 fL      MCH 31.5 pg      MCHC 32.9 g/dL      RDW 12.4 %      RDW-SD 43.4 fl      MPV 10.5 fL      Platelets 186 10*3/mm3      Neutrophil % 75.8 %      Lymphocyte % 11.6 %      Monocyte % 9.7 %      Eosinophil % 1.9 %      Basophil % 0.3 %      Immature Grans % 0.7 %      Neutrophils, Absolute 7.60 10*3/mm3      Lymphocytes, Absolute 1.16 10*3/mm3      Monocytes, Absolute 0.97 10*3/mm3      Eosinophils, Absolute 0.19 10*3/mm3      Basophils, Absolute 0.03 10*3/mm3      Immature Grans, Absolute 0.07 10*3/mm3      nRBC 0.0 /100 WBC     Urine Culture - Urine, Urine, Random Void [[id number]] Collected: 09/13/22 0106    Specimen: Urine, Random Void Updated: 09/14/22 0700     Urine Culture >100,000 CFU/mL Mixed Lesley Isolated    Narrative:      Specimen contains mixed organisms of questionable pathogenicity suggestive of contamination. If symptoms persist, suggest recollection.  Colonization of the urinary tract without infection is common. Treatment is discouraged unless the patient is symptomatic, pregnant, or undergoing an invasive urologic procedure.    CBC & Differential [[id number]]  (Abnormal) Collected: 09/14/22 0445    Specimen: Blood Updated: 09/14/22 0553    Narrative:      The following orders were created for panel order CBC & Differential.  Procedure                               Abnormality         Status                     ---------                               -----------         ------                     CBC Auto Differential[[id number]]        Abnormal            Final result                 Please view results for these tests on the individual orders.    CBC Auto  Differential [[id number]]  (Abnormal) Collected: 09/14/22 0445    Specimen: Blood Updated: 09/14/22 0553     WBC 10.97 10*3/mm3      RBC 2.97 10*6/mm3      Hemoglobin 9.2 g/dL      Hematocrit 28.2 %      MCV 94.9 fL      MCH 31.0 pg      MCHC 32.6 g/dL      RDW 12.5 %      RDW-SD 43.7 fl      MPV 10.2 fL      Platelets 199 10*3/mm3      Neutrophil % 83.1 %      Lymphocyte % 9.0 %      Monocyte % 7.1 %      Eosinophil % 0.2 %      Basophil % 0.1 %      Immature Grans % 0.5 %      Neutrophils, Absolute 9.12 10*3/mm3      Lymphocytes, Absolute 0.99 10*3/mm3      Monocytes, Absolute 0.78 10*3/mm3      Eosinophils, Absolute 0.02 10*3/mm3      Basophils, Absolute 0.01 10*3/mm3      Immature Grans, Absolute 0.05 10*3/mm3      nRBC 0.0 /100 WBC     Comprehensive Metabolic Panel [[id number]]  (Abnormal) Collected: 09/14/22 0445    Specimen: Blood Updated: 09/14/22 0534     Glucose 125 mg/dL      BUN 32 mg/dL      Creatinine 0.76 mg/dL      Sodium 139 mmol/L      Potassium 4.1 mmol/L      Chloride 105 mmol/L      CO2 24.4 mmol/L      Calcium 7.8 mg/dL      Total Protein 5.4 g/dL      Albumin 3.40 g/dL      ALT (SGPT) 13 U/L      AST (SGOT) 25 U/L      Alkaline Phosphatase 78 U/L      Total Bilirubin 0.5 mg/dL      Globulin 2.0 gm/dL      A/G Ratio 1.7 g/dL      BUN/Creatinine Ratio 42.1     Anion Gap 9.6 mmol/L      eGFR 75.0 mL/min/1.73      Comment: National Kidney Foundation and American Society of Nephrology (ASN) Task Force recommended calculation based on the Chronic Kidney Disease Epidemiology Collaboration (CKD-EPI) equation refit without adjustment for race.       Narrative:      GFR Normal >60  Chronic Kidney Disease <60  Kidney Failure <15      Basic Metabolic Panel [[id number]]  (Abnormal) Collected: 09/13/22 0532    Specimen: Blood Updated: 09/13/22 0650     Glucose 123 mg/dL      BUN 26 mg/dL      Creatinine 0.68 mg/dL      Sodium 140 mmol/L      Potassium 4.1 mmol/L      Chloride 106 mmol/L      CO2 24.0 mmol/L       Calcium 8.3 mg/dL      BUN/Creatinine Ratio 38.2     Anion Gap 10.0 mmol/L      eGFR 83.4 mL/min/1.73      Comment: National Kidney Foundation and American Society of Nephrology (ASN) Task Force recommended calculation based on the Chronic Kidney Disease Epidemiology Collaboration (CKD-EPI) equation refit without adjustment for race.       Narrative:      GFR Normal >60  Chronic Kidney Disease <60  Kidney Failure <15      CBC (No Diff) [[id number]]  (Abnormal) Collected: 09/13/22 0532    Specimen: Blood Updated: 09/13/22 0551     WBC 16.98 10*3/mm3      RBC 3.50 10*6/mm3      Hemoglobin 11.2 g/dL      Hematocrit 33.6 %      MCV 96.0 fL      MCH 32.0 pg      MCHC 33.3 g/dL      RDW 12.5 %      RDW-SD 44.1 fl      MPV 10.0 fL      Platelets 248 10*3/mm3     Urinalysis, Microscopic Only - Urine, Random Void [[id number]]  (Abnormal) Collected: 09/13/22 0106    Specimen: Urine, Random Void Updated: 09/13/22 0132     RBC, UA 6-12 /HPF      WBC, UA 21-30 /HPF      Bacteria, UA 4+ /HPF      Squamous Epithelial Cells, UA 0-2 /HPF      Hyaline Casts, UA 21-30 /LPF      Methodology Automated Microscopy    Urinalysis With Culture If Indicated - Urine, Random Void [[id number]]  (Abnormal) Collected: 09/13/22 0106    Specimen: Urine, Random Void Updated: 09/13/22 0126     Color, UA Yellow     Appearance, UA Cloudy     pH, UA 6.5     Specific Gravity, UA 1.017     Glucose, UA Negative     Ketones, UA Negative     Bilirubin, UA Negative     Blood, UA Small (1+)     Protein, UA 30 mg/dL (1+)     Leuk Esterase, UA Small (1+)     Nitrite, UA Negative     Urobilinogen, UA 1.0 E.U./dL    Narrative:      In absence of clinical symptoms, the presence of pyuria, bacteria, and/or nitrites on the urinalysis result does not correlate with infection.    Comprehensive Metabolic Panel [[id number]]  (Abnormal) Collected: 09/12/22 1646    Specimen: Blood Updated: 09/12/22 1719     Glucose 151 mg/dL      BUN 20 mg/dL      Creatinine 0.64 mg/dL       Sodium 138 mmol/L      Potassium 4.1 mmol/L      Chloride 100 mmol/L      CO2 24.0 mmol/L      Calcium 9.1 mg/dL      Total Protein 7.0 g/dL      Albumin 4.70 g/dL      ALT (SGPT) 16 U/L      AST (SGOT) 26 U/L      Alkaline Phosphatase 108 U/L      Total Bilirubin 1.0 mg/dL      Globulin 2.3 gm/dL      A/G Ratio 2.0 g/dL      BUN/Creatinine Ratio 31.3     Anion Gap 14.0 mmol/L      eGFR 84.6 mL/min/1.73      Comment: National Kidney Foundation and American Society of Nephrology (ASN) Task Force recommended calculation based on the Chronic Kidney Disease Epidemiology Collaboration (CKD-EPI) equation refit without adjustment for race.       Narrative:      GFR Normal >60  Chronic Kidney Disease <60  Kidney Failure <15      CBC & Differential [[id number]]  (Abnormal) Collected: 09/12/22 1646    Specimen: Blood Updated: 09/12/22 1657    Narrative:      The following orders were created for panel order CBC & Differential.  Procedure                               Abnormality         Status                     ---------                               -----------         ------                     CBC Auto Differential[[id number]]        Abnormal            Final result                 Please view results for these tests on the individual orders.    CBC Auto Differential [[id number]]  (Abnormal) Collected: 09/12/22 1646    Specimen: Blood Updated: 09/12/22 1657     WBC 17.36 10*3/mm3      RBC 3.94 10*6/mm3      Hemoglobin 12.4 g/dL      Hematocrit 36.9 %      MCV 93.7 fL      MCH 31.5 pg      MCHC 33.6 g/dL      RDW 12.2 %      RDW-SD 41.9 fl      MPV 10.0 fL      Platelets 274 10*3/mm3      Neutrophil % 93.3 %      Lymphocyte % 2.6 %      Monocyte % 3.3 %      Eosinophil % 0.0 %      Basophil % 0.1 %      Immature Grans % 0.7 %      Neutrophils, Absolute 16.18 10*3/mm3      Lymphocytes, Absolute 0.45 10*3/mm3      Monocytes, Absolute 0.58 10*3/mm3      Eosinophils, Absolute 0.00 10*3/mm3      Basophils, Absolute 0.02 10*3/mm3   "    Immature Grans, Absolute 0.13 10*3/mm3      nRBC 0.0 /100 WBC         /85 (BP Location: Right arm, Patient Position: Lying)   Pulse 82   Temp 98.3 °F (36.8 °C) (Oral)   Resp 16   Ht 157.5 cm (62\")   SpO2 94%   BMI 24.14 kg/m²     Discharge Exam:  General Appearance:    Alert, cooperative, no distress                          Head:    Normocephalic, without obvious abnormality, atraumatic                          Eyes:                            Throat:   Lips, tongue, gums normal                          Neck:   Supple, symmetrical, trachea midline, no JVD                        Lungs:     Clear to auscultation bilaterally, respirations unlabored                Chest Wall:    No tenderness or deformity                        Heart:    Regular rate and rhythm, S1 and S2 normal, no murmur,no  Rub  or gallop                  Abdomen:     Soft, non-tender, bowel sounds active, no masses, no organomegaly                  Extremities:   Extremities normal, left hip with surgical changes, no cyanosis or edema                             Skin:   Skin is warm and dry,  no rashes or palpable lesions                  Neurologic:   no focal deficits noted     Disposition:  Skilled nursing facility    Activity as tolerated    Diet as tolerated  Diet Order   Procedures   • Diet Regular       Patient Instructions:      Discharge Medications      New Medications      Instructions Start Date   acetaminophen 325 MG tablet  Commonly known as: TYLENOL   650 mg, Oral, Every 4 Hours PRN      aspirin 81 MG chewable tablet   81 mg, Oral, 2 Times Daily      HYDROcodone-acetaminophen 5-325 MG per tablet  Commonly known as: NORCO   1 tablet, Oral, Every 4 Hours PRN         Continue These Medications      Instructions Start Date   fish oil 500 MG capsule capsule   1 capsule, Oral      Hair Skin and Nails Formula tablet tablet  Generic drug: multivitamin with minerals   Oral         Stop These Medications    Diclofenac Sodium 1 " % gel gel  Commonly known as: VOLTAREN     meloxicam 15 MG tablet  Commonly known as: MOBIC     naproxen sodium 220 MG tablet  Commonly known as: ALEVE     Unable to find          No future appointments.   Contact information for follow-up providers     Merlyn Noonan APRN Follow up.    Specialty: Nurse Practitioner  Contact information:  [address]  [facility] [address]  [facility] [address]  [phone]             Guilherme Oglesby MD .    Specialty: Internal Medicine  Why: After released from rehab  Contact information:  [address] [facility] [address]  [facility] [id number] [phone]             Jm Barba MD Follow up.    Specialty: Orthopedic Surgery  Contact information:  [address]  [facility] [address]  [phone]                   Contact information for after-discharge care     Destination     [facility] .    Service: Skilled Nursing  Contact information:  [address]  [facility] [address]  [phone]                           Discharge Order (From admission, onward)     Start     Ordered    09/17/22 1159  Discharge patient  Once        Expected Discharge Date: 09/17/22    Discharge Disposition: Skilled Nursing Facility (DC - External)    Physician of Record for Attribution - Please select from Treatment Team: LEO NEWTON [[id number]]    Review needed by CMO to determine Physician of Record: No       Question Answer Comment   Physician of Record for Attribution - Please select from Treatment Team LEO NEWTON    Review needed by CMO to determine Physician of Record No        09/17/22 1158                Total time spent discharging patient including evaluation,post hospitalization follow up,  medication and post hospitalization instructions and education total time exceeds 30 minutes.    Signed:  Leo Newton MD  9/17/2022  12:32 EDT

## 2022-09-17 NOTE — PLAN OF CARE
Goal Outcome Evaluation:            Pt alert, confused, responding to commands, family bedside.

## 2022-09-17 NOTE — PROGRESS NOTES
Continued Stay Note  [facility]     Patient Name: Nyasia Dalal  MRN: [id number]  Today's Date: 9/17/2022    Admit Date: 9/12/2022     Discharge Plan     Row Name 09/17/22 1139       Plan    Plan [facility]    Plan Comments Spoke to daughter Elizabeth to discuss transport to [facility]  Pt is not safe to transport by POV. (impulsive, not following directions).  She would like pt not to discharge at 11 pm.  She feels it would add to agitation and confusion.  Pt to dischage Sunday               Discharge Codes    No documentation.               Expected Discharge Date and Time     Expected Discharge Date Expected Discharge Time    Sep 17, 2022             Arianne Delcid RN

## 2022-09-18 VITALS
HEART RATE: 85 BPM | RESPIRATION RATE: 16 BRPM | BODY MASS INDEX: 24.3 KG/M2 | DIASTOLIC BLOOD PRESSURE: 88 MMHG | OXYGEN SATURATION: 96 % | SYSTOLIC BLOOD PRESSURE: 141 MMHG | TEMPERATURE: 98.2 F | WEIGHT: 132.06 LBS | HEIGHT: 62 IN

## 2022-09-18 RX ADMIN — ASPIRIN 81 MG: 81 TABLET, CHEWABLE ORAL at 08:02

## 2022-09-18 RX ADMIN — MULTIPLE VITAMINS W/ MINERALS TAB 1 TABLET: TAB at 08:02

## 2022-09-18 NOTE — CASE MANAGEMENT/SOCIAL WORK
"Physicians Statement of Medical Necessity for  Ambulance Transportation    GENERAL INFORMATION     Name: Nyasia Dalal  YOB: 1933  Medicare #: [id number]  Transport Date: 9/18/22 (Valid for round trips this date, or for scheduled repetitive trips for 60 days from the date signed below.)  Origin: [facility]    Destination: [facility] ([facility]  Is the Patient's stay covered under Medicare Part A (PPS/DRG?)Yes  Closest appropriate facility? Yes  If this a hosp-hosp transfer? No  Is this a hospice patient? No    MEDICAL NECESSITY QUESTIONAIRE    Ambulance Transportation is medically necessary only if other means of transportation are contraindicated or would be potentially harmful to the patient.  To meet this requirement, the patient must be either \"bed confined\" or suffer from a condition such that transport by means other than an ambulance is contraindicated by the patient's condition.  The following questions must be answered by the healthcare professional signing below for this form to be valid:     1) Describe the MEDICAL CONDITION (physical and/or mental) of this patient AT THE TIME OF AMBULANCE TRANSPORT that requires the patient to be transported in an ambulance, and why transport by other means is contraindicated by the patient's condition: femoral fracture   Past Medical History:   Diagnosis Date   • Breast cancer (HCC) 04/21/2014   • Irritable bowel syndrome    • Osteoporosis    • Skin cancer       Past Surgical History:   Procedure Laterality Date   • BREAST LUMPECTOMY Left    • CATARACT EXTRACTION EXTRACAPSULAR W/ INTRAOCULAR LENS IMPLANTATION Bilateral    • COLONOSCOPY     • EYE SURGERY     • SKIN CANCER EXCISION  06/26/2012   • TOTAL HIP ARTHROPLASTY Left 9/13/2022    Procedure: TOTAL HIP ARTHROPLASTY ANTERIOR WITH HANA TABLE;  Surgeon: Jm Barba II, MD;  Location: [facility];  Service: Orthopedics;  Laterality: Left;      2) Is this " "patient \"bed confined\" as defined below?Yes   To be \"bed confined\" the patient must satisfy all three of the following criteria:  (1) unable to get up from bed without assistance; AND (2) unable to ambulate;  AND (3) unable to sit in a chair or wheelchair.  3) Can this patient safely be transported by car or wheelchair van (I.e., may safely sit during transport, without an attendant or monitoring?)No   4. In addition to completing questions 1-3 above, please check any of the following conditions that apply*:          *Note: supporting documentation for any boxes checked must be maintained in the patient's medical records Non-healed fractures and Unable to tolerate seated position for time needed to transport      SIGNATURE OF PHYSICIAN OR OTHER AUTHORIZED HEALTHCARE PROFESSIONAL    I certify that the above information is true and correct based on my evaluation of this patient, and represent that the patient requires transport by ambulance and that other forms of transport are contraindicated.  I understand that this information will be used by the Centers for Medicare and Medicaid Services (CMS) to support the determiniation of medical necessity for ambulance services, and I represent that I have personal knowledge of the patient's condition at the time of transport.    X   If this box is checked, I also certify that the patient is physically or mentally incapable of signing the ambulance service's claim form and that the institution with which I am affiliated has furnished care, services or assistance to the patient.  My signature below is made on behalf of the patient pursuant to 42 .36(b)(4). In accordance with 42 .37, the specific reason(s) that the patient is physically or mentally incapable of signing the claim for is as follows:     Signature of Physician or Healthcare Professional   Katy Delatorre RN CM  Date/Time:   9/18/2022 09:07 AM     (For Scheduled repetitive transport, this form is " not valid for transports performed more than 60 days after this date).                                                                                                                                            --------------------------------------------------------------------------------------------  Printed Name and Credentials of Physician or Authorized Healthcare Professional     *Form must be signed by patient's attending physician for scheduled, repetitive transports,.  For non-repetitive ambulance transports, if unable to obtain the signature of the attending physician, any of the following may sign (please select below):     Physician  Clinical Nurse Specialist  Registered Nurse     Physician Assistant x Discharge Planner  Licensed Practical Nurse     Nurse Practitioner x

## 2022-09-18 NOTE — CASE MANAGEMENT/SOCIAL WORK
Case Management Discharge Note      Final Note: DC'd to skilled bed at [facility] via [facility]    Provided Post Acute Provider List?: Yes  Post Acute Provider List: Inpatient Rehab  Delivered To: Support Person  Method of Delivery: Telephone    Selected Continued Care - Discharged on 9/18/2022 Admission date: 9/12/2022 - Discharge disposition: Skilled Nursing Facility (DC - External)    Destination Coordination complete.    Service Provider Selected Services Address Phone Fax Patient Preferred    [facility]  Skilled Nursing [address] [place] [id number] [phone] --                      Transportation Services  Ambulance: [facility]    Final Discharge Disposition Code: 03 - skilled nursing facility (SNF)

## 2022-09-18 NOTE — DISCHARGE SUMMARY
PHYSICIAN DISCHARGE SUMMARY                                                                        [facility]    Patient Identification:  Name: Nyasia Dalal  Age: 89 y.o.  Sex: female  :  1933  MRN: [id number]  Primary Care Physician: Guilherme Oglesby MD    Admit date: 2022  Discharge date and time:2022  Discharged Condition: good    Discharge Diagnoses:  Active Hospital Problems    Diagnosis  POA   • **Closed displaced fracture of left femoral neck (HCC) [S72.002A]  Yes   • Other chronic pain [G89.29]  Yes   • Spinal stenosis of 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          PMHX:   Past Medical History:   Diagnosis Date   • Breast cancer (HCC) 2014   • Irritable bowel syndrome    • Osteoporosis    • Skin cancer      PSHX:   Past Surgical History:   Procedure Laterality Date   • BREAST LUMPECTOMY Left    • CATARACT EXTRACTION EXTRACAPSULAR W/ INTRAOCULAR LENS IMPLANTATION Bilateral    • COLONOSCOPY     • EYE SURGERY     • SKIN CANCER EXCISION  2012   • TOTAL HIP ARTHROPLASTY Left 2022    Procedure: TOTAL HIP ARTHROPLASTY ANTERIOR WITH HANA TABLE;  Surgeon: Jm Barba II, MD;  Location: [facility];  Service: Orthopedics;  Laterality: Left;       Hospital Course: Nyasia Dalal  Is a 89 year old female who recently moved to an assisted living facility; she came in after a fall; she was found on the floor by staff; she is unsure what happened; she has had several recent falls according to family; she is not able to answer questions well but this is not new according to the daughter who is present.  The patient was admitted to the hospital and seen by orthopedic surgery.  The patient had surgery for hip replacement.  The patient was pretty weak after surgery and the  plan is to go to a skilled nursing facility for rehab.  She will follow-up with her primary care after released from rehab.  She will also follow-up with orthopedic surgery for postop check.  The patient ended up staying an extra day due to problems with getting transportation to the skilled nursing facility.      Consults:     Consults     Date and Time Order Name Status Description    9/15/2022  4:34 AM Inpatient Urology Consult Completed     9/12/2022  5:29 PM Inpatient Orthopedic Surgery Consult Completed     9/12/2022  5:06 PM [facility] (on-call MD unless specified) Details      9/12/2022  5:06 PM Ortho (on-call MD unless specified)          Results from last 7 days   Lab Units 09/17/22  0428   WBC 10*3/mm3 10.33   HEMOGLOBIN g/dL 9.4*   HEMATOCRIT % 28.2*   PLATELETS 10*3/mm3 259     Results from last 7 days   Lab Units 09/17/22  0428   SODIUM mmol/L 136   POTASSIUM mmol/L 3.7   CHLORIDE mmol/L 102   CO2 mmol/L 24.1   BUN mg/dL 17   CREATININE mg/dL 0.51*   GLUCOSE mg/dL 102*   CALCIUM mg/dL 8.1*     Significant Diagnostic Studies:   WBC   Date Value Ref Range Status   09/17/2022 10.33 3.40 - 10.80 10*3/mm3 Final     Hemoglobin   Date Value Ref Range Status   09/17/2022 9.4 (L) 12.0 - 15.9 g/dL Final     Hematocrit   Date Value Ref Range Status   09/17/2022 28.2 (L) 34.0 - 46.6 % Final     Platelets   Date Value Ref Range Status   09/17/2022 259 140 - 450 10*3/mm3 Final     Sodium   Date Value Ref Range Status   09/17/2022 136 136 - 145 mmol/L Final     Potassium   Date Value Ref Range Status   09/17/2022 3.7 3.5 - 5.2 mmol/L Final     Chloride   Date Value Ref Range Status   09/17/2022 102 98 - 107 mmol/L Final     CO2   Date Value Ref Range Status   09/17/2022 24.1 22.0 - 29.0 mmol/L Final     BUN   Date Value Ref Range Status   09/17/2022 17 8 - 23 mg/dL Final     Creatinine   Date Value Ref Range Status   09/17/2022 0.51 (L) 0.57 - 1.00 mg/dL Final     Glucose   Date Value Ref Range Status   09/17/2022 102 (H)  65 - 99 mg/dL Final     Calcium   Date Value Ref Range Status   09/17/2022 8.1 (L) 8.6 - 10.5 mg/dL Final     AST (SGOT)   Date Value Ref Range Status   09/16/2022 34 (H) 1 - 32 U/L Final     ALT (SGPT)   Date Value Ref Range Status   09/16/2022 21 1 - 33 U/L Final     Alkaline Phosphatase   Date Value Ref Range Status   09/16/2022 135 (H) 39 - 117 U/L Final     No results found for: APTT, INR  No results found for: COLORU, CLARITYU, SPECGRAV, PHUR, PROTEINUR, GLUCOSEU, KETONESU, BLOODU, NITRITE, LEUKOCYTESUR, BILIRUBINUR, UROBILINOGEN, RBCUA, WBCUA, BACTERIA, UACOMMENT  No results found for: TROPONINT, TROPONINI, BNP  No components found for: HGBA1C;2  No components found for: TSH;2  Imaging Results (All)     Procedure Component Value Units Date/Time    XR Hip With or Without Pelvis 1 View Left [[id number]] Collected: 09/13/22 0916     Updated: 09/13/22 1420    Narrative:      XR HIP WITH OR WITHOUT PELVIS 1 VIEW LEFT-  INTRAOPERATIVE VIEWS  09/13/2022     HISTORY: Left hip arthroplasty.     Intraoperative views were used for localization during left hip  arthroplasty. The acetabular and proximal femoral components are  well-seated. No unexpected findings are noted.     Fluoroscopy time 6 seconds, 1 image.     This report was finalized on 9/13/2022 2:17 PM by Dr. Juan Manuel Chow M.D.       XR Pelvis 1 or 2 View [[id number]] Collected: 09/13/22 0859     Updated: 09/13/22 0903    Narrative:      XR PELVIS 1 OR 2 VW-  09/13/2022     HISTORY: Postop left hip arthroplasty.     The acetabular and proximal femoral components of the left hip  prosthesis are well-seated with no abnormal surrounding bony lucencies.  Soft tissue air and skin staples are seen.     No unexpected findings are noted.     There is moderate degenerative arthritis of the right hip.       Impression:      1. Satisfactory postoperative appearance of the left hip.     This report was finalized on 9/13/2022 9:00 AM by Dr. Juan Manuel Chow M.D.        FL C Arm During Surgery [[id number]] Resulted: 09/13/22 0808     Updated: 09/13/22 0808    Narrative:      This procedure was auto-finalized with no dictation required.    CT Head Without Contrast [[id number]] Collected: 09/12/22 1655     Updated: 09/12/22 1904    Narrative:      CT SCAN OF THE BRAIN WITHOUT CONTRAST     HISTORY: Recent fall. Possible head trauma. Confusion.     The CT scan was performed as an emergency procedure through the brain  without contrast. There is prominent diffuse atrophy and chronic small  vessel ischemic change similar to the study of 05/16/2022. There is no  evidence of acute intracranial hemorrhage or mass effect. The visualized  sinuses and mastoid air cells are clear.                 Radiation dose reduction techniques were utilized, including automated  exposure control and exposure modulation based on body size.     This report was finalized on 9/12/2022 7:01 PM by Dr. Syed Guzman M.D.       XR Hip With or Without Pelvis 2 - 3 View Left [[id number]] Collected: 09/12/22 1640     Updated: 09/12/22 1645    Narrative:      TWO-VIEW LEFT HIP AND ONE VIEW AP PELVIS     HISTORY: Fell. Hip pain.     FINDINGS: There is an acute fracture of the left femoral neck with  superior displacement of the femur relative to the femoral head.     ONE VIEW SUPINE CHEST     HISTORY: Preop for hip surgery. Hip fracture. Breast cancer.     FINDINGS: There is mild cardiomegaly unchanged from 01/23/2014. The  lungs are clear with some minimal upper lobe vascular prominence. There  is no focal infiltrate.     This report was finalized on 9/12/2022 4:41 PM by Dr. Syed Guzman M.D.       XR Chest 1 View [[id number]] Collected: 09/12/22 1640     Updated: 09/12/22 1645    Narrative:      TWO-VIEW LEFT HIP AND ONE VIEW AP PELVIS     HISTORY: Fell. Hip pain.     FINDINGS: There is an acute fracture of the left femoral neck with  superior displacement of the femur relative to the femoral head.     ONE VIEW  SUPINE CHEST     HISTORY: Preop for hip surgery. Hip fracture. Breast cancer.     FINDINGS: There is mild cardiomegaly unchanged from 01/23/2014. The  lungs are clear with some minimal upper lobe vascular prominence. There  is no focal infiltrate.     This report was finalized on 9/12/2022 4:41 PM by Dr. Syed Guzman M.D.           Lab Results (last 7 days)     Procedure Component Value Units Date/Time    Basic Metabolic Panel [[id number]]  (Abnormal) Collected: 09/17/22 0428    Specimen: Blood Updated: 09/17/22 0611     Glucose 102 mg/dL      BUN 17 mg/dL      Creatinine 0.51 mg/dL      Sodium 136 mmol/L      Potassium 3.7 mmol/L      Chloride 102 mmol/L      CO2 24.1 mmol/L      Calcium 8.1 mg/dL      BUN/Creatinine Ratio 33.3     Anion Gap 9.9 mmol/L      eGFR 89.4 mL/min/1.73      Comment: National Kidney Foundation and American Society of Nephrology (ASN) Task Force recommended calculation based on the Chronic Kidney Disease Epidemiology Collaboration (CKD-EPI) equation refit without adjustment for race.       Narrative:      GFR Normal >60  Chronic Kidney Disease <60  Kidney Failure <15      CBC & Differential [[id number]]  (Abnormal) Collected: 09/17/22 0428    Specimen: Blood Updated: 09/17/22 0519    Narrative:      The following orders were created for panel order CBC & Differential.  Procedure                               Abnormality         Status                     ---------                               -----------         ------                     CBC Auto Differential[[id number]]        Abnormal            Final result                 Please view results for these tests on the individual orders.    CBC Auto Differential [[id number]]  (Abnormal) Collected: 09/17/22 0428    Specimen: Blood Updated: 09/17/22 0519     WBC 10.33 10*3/mm3      RBC 3.02 10*6/mm3      Hemoglobin 9.4 g/dL      Hematocrit 28.2 %      MCV 93.4 fL      MCH 31.1 pg      MCHC 33.3 g/dL      RDW 12.4 %      RDW-SD 42.7 fl       MPV 10.3 fL      Platelets 259 10*3/mm3      Neutrophil % 75.2 %      Lymphocyte % 13.2 %      Monocyte % 8.9 %      Eosinophil % 1.7 %      Basophil % 0.4 %      Immature Grans % 0.6 %      Neutrophils, Absolute 7.77 10*3/mm3      Lymphocytes, Absolute 1.36 10*3/mm3      Monocytes, Absolute 0.92 10*3/mm3      Eosinophils, Absolute 0.18 10*3/mm3      Basophils, Absolute 0.04 10*3/mm3      Immature Grans, Absolute 0.06 10*3/mm3      nRBC 0.1 /100 WBC     Comprehensive Metabolic Panel [[id number]]  (Abnormal) Collected: 09/16/22 0423    Specimen: Blood Updated: 09/16/22 0730     Glucose 107 mg/dL      BUN 16 mg/dL      Creatinine 0.56 mg/dL      Sodium 135 mmol/L      Potassium 3.8 mmol/L      Chloride 101 mmol/L      CO2 25.4 mmol/L      Calcium 7.5 mg/dL      Total Protein 5.2 g/dL      Albumin 3.00 g/dL      ALT (SGPT) 21 U/L      AST (SGOT) 34 U/L      Alkaline Phosphatase 135 U/L      Total Bilirubin 0.9 mg/dL      Globulin 2.2 gm/dL      A/G Ratio 1.4 g/dL      BUN/Creatinine Ratio 28.6     Anion Gap 8.6 mmol/L      eGFR 87.4 mL/min/1.73      Comment: National Kidney Foundation and American Society of Nephrology (ASN) Task Force recommended calculation based on the Chronic Kidney Disease Epidemiology Collaboration (CKD-EPI) equation refit without adjustment for race.       Narrative:      GFR Normal >60  Chronic Kidney Disease <60  Kidney Failure <15      CBC & Differential [[id number]]  (Abnormal) Collected: 09/16/22 0423    Specimen: Blood Updated: 09/16/22 0537    Narrative:      The following orders were created for panel order CBC & Differential.  Procedure                               Abnormality         Status                     ---------                               -----------         ------                     CBC Auto Differential[[id number]]        Abnormal            Final result                 Please view results for these tests on the individual orders.    CBC Auto Differential [[id number]]   (Abnormal) Collected: 09/16/22 0423    Specimen: Blood Updated: 09/16/22 0537     WBC 10.90 10*3/mm3      RBC 2.90 10*6/mm3      Hemoglobin 8.9 g/dL      Hematocrit 26.1 %      MCV 90.0 fL      MCH 30.7 pg      MCHC 34.1 g/dL      RDW 12.0 %      RDW-SD 40.2 fl      MPV 10.8 fL      Platelets 206 10*3/mm3      Neutrophil % 78.0 %      Lymphocyte % 11.4 %      Monocyte % 9.0 %      Eosinophil % 0.8 %      Basophil % 0.2 %      Immature Grans % 0.6 %      Neutrophils, Absolute 8.50 10*3/mm3      Lymphocytes, Absolute 1.24 10*3/mm3      Monocytes, Absolute 0.98 10*3/mm3      Eosinophils, Absolute 0.09 10*3/mm3      Basophils, Absolute 0.02 10*3/mm3      Immature Grans, Absolute 0.07 10*3/mm3      nRBC 0.0 /100 WBC     Comprehensive Metabolic Panel [[id number]]  (Abnormal) Collected: 09/15/22 0424    Specimen: Blood Updated: 09/15/22 0511     Glucose 112 mg/dL      BUN 24 mg/dL      Creatinine 0.55 mg/dL      Sodium 136 mmol/L      Potassium 4.0 mmol/L      Chloride 105 mmol/L      CO2 24.0 mmol/L      Calcium 7.4 mg/dL      Total Protein 4.8 g/dL      Albumin 3.00 g/dL      ALT (SGPT) 14 U/L      AST (SGOT) 35 U/L      Alkaline Phosphatase 98 U/L      Total Bilirubin 0.6 mg/dL      Globulin 1.8 gm/dL      A/G Ratio 1.7 g/dL      BUN/Creatinine Ratio 43.6     Anion Gap 7.0 mmol/L      eGFR 87.7 mL/min/1.73      Comment: National Kidney Foundation and American Society of Nephrology (ASN) Task Force recommended calculation based on the Chronic Kidney Disease Epidemiology Collaboration (CKD-EPI) equation refit without adjustment for race.       Narrative:      GFR Normal >60  Chronic Kidney Disease <60  Kidney Failure <15      CBC & Differential [[id number]]  (Abnormal) Collected: 09/15/22 0424    Specimen: Blood Updated: 09/15/22 0453    Narrative:      The following orders were created for panel order CBC & Differential.  Procedure                               Abnormality         Status                     ---------                                -----------         ------                     CBC Auto Differential[[id number]]        Abnormal            Final result                 Please view results for these tests on the individual orders.    CBC Auto Differential [[id number]]  (Abnormal) Collected: 09/15/22 0424    Specimen: Blood Updated: 09/15/22 0453     WBC 10.02 10*3/mm3      RBC 2.67 10*6/mm3      Hemoglobin 8.4 g/dL      Hematocrit 25.5 %      MCV 95.5 fL      MCH 31.5 pg      MCHC 32.9 g/dL      RDW 12.4 %      RDW-SD 43.4 fl      MPV 10.5 fL      Platelets 186 10*3/mm3      Neutrophil % 75.8 %      Lymphocyte % 11.6 %      Monocyte % 9.7 %      Eosinophil % 1.9 %      Basophil % 0.3 %      Immature Grans % 0.7 %      Neutrophils, Absolute 7.60 10*3/mm3      Lymphocytes, Absolute 1.16 10*3/mm3      Monocytes, Absolute 0.97 10*3/mm3      Eosinophils, Absolute 0.19 10*3/mm3      Basophils, Absolute 0.03 10*3/mm3      Immature Grans, Absolute 0.07 10*3/mm3      nRBC 0.0 /100 WBC     Urine Culture - Urine, Urine, Random Void [[id number]] Collected: 09/13/22 0106    Specimen: Urine, Random Void Updated: 09/14/22 0700     Urine Culture >100,000 CFU/mL Mixed Lesley Isolated    Narrative:      Specimen contains mixed organisms of questionable pathogenicity suggestive of contamination. If symptoms persist, suggest recollection.  Colonization of the urinary tract without infection is common. Treatment is discouraged unless the patient is symptomatic, pregnant, or undergoing an invasive urologic procedure.    CBC & Differential [[id number]]  (Abnormal) Collected: 09/14/22 0445    Specimen: Blood Updated: 09/14/22 0553    Narrative:      The following orders were created for panel order CBC & Differential.  Procedure                               Abnormality         Status                     ---------                               -----------         ------                     CBC Auto Differential[[id number]]        Abnormal             Final result                 Please view results for these tests on the individual orders.    CBC Auto Differential [[id number]]  (Abnormal) Collected: 09/14/22 0445    Specimen: Blood Updated: 09/14/22 0553     WBC 10.97 10*3/mm3      RBC 2.97 10*6/mm3      Hemoglobin 9.2 g/dL      Hematocrit 28.2 %      MCV 94.9 fL      MCH 31.0 pg      MCHC 32.6 g/dL      RDW 12.5 %      RDW-SD 43.7 fl      MPV 10.2 fL      Platelets 199 10*3/mm3      Neutrophil % 83.1 %      Lymphocyte % 9.0 %      Monocyte % 7.1 %      Eosinophil % 0.2 %      Basophil % 0.1 %      Immature Grans % 0.5 %      Neutrophils, Absolute 9.12 10*3/mm3      Lymphocytes, Absolute 0.99 10*3/mm3      Monocytes, Absolute 0.78 10*3/mm3      Eosinophils, Absolute 0.02 10*3/mm3      Basophils, Absolute 0.01 10*3/mm3      Immature Grans, Absolute 0.05 10*3/mm3      nRBC 0.0 /100 WBC     Comprehensive Metabolic Panel [[id number]]  (Abnormal) Collected: 09/14/22 0445    Specimen: Blood Updated: 09/14/22 0534     Glucose 125 mg/dL      BUN 32 mg/dL      Creatinine 0.76 mg/dL      Sodium 139 mmol/L      Potassium 4.1 mmol/L      Chloride 105 mmol/L      CO2 24.4 mmol/L      Calcium 7.8 mg/dL      Total Protein 5.4 g/dL      Albumin 3.40 g/dL      ALT (SGPT) 13 U/L      AST (SGOT) 25 U/L      Alkaline Phosphatase 78 U/L      Total Bilirubin 0.5 mg/dL      Globulin 2.0 gm/dL      A/G Ratio 1.7 g/dL      BUN/Creatinine Ratio 42.1     Anion Gap 9.6 mmol/L      eGFR 75.0 mL/min/1.73      Comment: National Kidney Foundation and American Society of Nephrology (ASN) Task Force recommended calculation based on the Chronic Kidney Disease Epidemiology Collaboration (CKD-EPI) equation refit without adjustment for race.       Narrative:      GFR Normal >60  Chronic Kidney Disease <60  Kidney Failure <15      Basic Metabolic Panel [[id number]]  (Abnormal) Collected: 09/13/22 0532    Specimen: Blood Updated: 09/13/22 0650     Glucose 123 mg/dL      BUN 26 mg/dL      Creatinine 0.68  mg/dL      Sodium 140 mmol/L      Potassium 4.1 mmol/L      Chloride 106 mmol/L      CO2 24.0 mmol/L      Calcium 8.3 mg/dL      BUN/Creatinine Ratio 38.2     Anion Gap 10.0 mmol/L      eGFR 83.4 mL/min/1.73      Comment: National Kidney Foundation and American Society of Nephrology (ASN) Task Force recommended calculation based on the Chronic Kidney Disease Epidemiology Collaboration (CKD-EPI) equation refit without adjustment for race.       Narrative:      GFR Normal >60  Chronic Kidney Disease <60  Kidney Failure <15      CBC (No Diff) [[id number]]  (Abnormal) Collected: 09/13/22 0532    Specimen: Blood Updated: 09/13/22 0551     WBC 16.98 10*3/mm3      RBC 3.50 10*6/mm3      Hemoglobin 11.2 g/dL      Hematocrit 33.6 %      MCV 96.0 fL      MCH 32.0 pg      MCHC 33.3 g/dL      RDW 12.5 %      RDW-SD 44.1 fl      MPV 10.0 fL      Platelets 248 10*3/mm3     Urinalysis, Microscopic Only - Urine, Random Void [[id number]]  (Abnormal) Collected: 09/13/22 0106    Specimen: Urine, Random Void Updated: 09/13/22 0132     RBC, UA 6-12 /HPF      WBC, UA 21-30 /HPF      Bacteria, UA 4+ /HPF      Squamous Epithelial Cells, UA 0-2 /HPF      Hyaline Casts, UA 21-30 /LPF      Methodology Automated Microscopy    Urinalysis With Culture If Indicated - Urine, Random Void [[id number]]  (Abnormal) Collected: 09/13/22 0106    Specimen: Urine, Random Void Updated: 09/13/22 0126     Color, UA Yellow     Appearance, UA Cloudy     pH, UA 6.5     Specific Gravity, UA 1.017     Glucose, UA Negative     Ketones, UA Negative     Bilirubin, UA Negative     Blood, UA Small (1+)     Protein, UA 30 mg/dL (1+)     Leuk Esterase, UA Small (1+)     Nitrite, UA Negative     Urobilinogen, UA 1.0 E.U./dL    Narrative:      In absence of clinical symptoms, the presence of pyuria, bacteria, and/or nitrites on the urinalysis result does not correlate with infection.    Comprehensive Metabolic Panel [[id number]]  (Abnormal) Collected: 09/12/22 1871     Specimen: Blood Updated: 09/12/22 1719     Glucose 151 mg/dL      BUN 20 mg/dL      Creatinine 0.64 mg/dL      Sodium 138 mmol/L      Potassium 4.1 mmol/L      Chloride 100 mmol/L      CO2 24.0 mmol/L      Calcium 9.1 mg/dL      Total Protein 7.0 g/dL      Albumin 4.70 g/dL      ALT (SGPT) 16 U/L      AST (SGOT) 26 U/L      Alkaline Phosphatase 108 U/L      Total Bilirubin 1.0 mg/dL      Globulin 2.3 gm/dL      A/G Ratio 2.0 g/dL      BUN/Creatinine Ratio 31.3     Anion Gap 14.0 mmol/L      eGFR 84.6 mL/min/1.73      Comment: National Kidney Foundation and American Society of Nephrology (ASN) Task Force recommended calculation based on the Chronic Kidney Disease Epidemiology Collaboration (CKD-EPI) equation refit without adjustment for race.       Narrative:      GFR Normal >60  Chronic Kidney Disease <60  Kidney Failure <15      CBC & Differential [[id number]]  (Abnormal) Collected: 09/12/22 1646    Specimen: Blood Updated: 09/12/22 1657    Narrative:      The following orders were created for panel order CBC & Differential.  Procedure                               Abnormality         Status                     ---------                               -----------         ------                     CBC Auto Differential[[id number]]        Abnormal            Final result                 Please view results for these tests on the individual orders.    CBC Auto Differential [[id number]]  (Abnormal) Collected: 09/12/22 1646    Specimen: Blood Updated: 09/12/22 1657     WBC 17.36 10*3/mm3      RBC 3.94 10*6/mm3      Hemoglobin 12.4 g/dL      Hematocrit 36.9 %      MCV 93.7 fL      MCH 31.5 pg      MCHC 33.6 g/dL      RDW 12.2 %      RDW-SD 41.9 fl      MPV 10.0 fL      Platelets 274 10*3/mm3      Neutrophil % 93.3 %      Lymphocyte % 2.6 %      Monocyte % 3.3 %      Eosinophil % 0.0 %      Basophil % 0.1 %      Immature Grans % 0.7 %      Neutrophils, Absolute 16.18 10*3/mm3      Lymphocytes, Absolute 0.45 10*3/mm3       "Monocytes, Absolute 0.58 10*3/mm3      Eosinophils, Absolute 0.00 10*3/mm3      Basophils, Absolute 0.02 10*3/mm3      Immature Grans, Absolute 0.13 10*3/mm3      nRBC 0.0 /100 WBC         /88 (BP Location: Right arm, Patient Position: Lying)   Pulse 85   Temp 98.2 °F (36.8 °C) (Oral)   Resp 16   Ht 157.5 cm (62.01\")   Wt 59.9 kg (132 lb 0.9 oz)   SpO2 96%   BMI 24.15 kg/m²     Discharge Exam:  General Appearance:    Alert, cooperative, no distress                          Head:    Normocephalic, without obvious abnormality, atraumatic                          Eyes:                            Throat:   Lips, tongue, gums normal                          Neck:   Supple, symmetrical, trachea midline, no JVD                        Lungs:     Clear to auscultation bilaterally, respirations unlabored                Chest Wall:    No tenderness or deformity                        Heart:    Regular rate and rhythm, S1 and S2 normal, no murmur,no  Rub  or gallop                  Abdomen:     Soft, non-tender, bowel sounds active, no masses, no organomegaly                  Extremities:   Extremities normal, left hip with surgical changes, no cyanosis or edema                             Skin:   Skin is warm and dry,  no rashes or palpable lesions                  Neurologic:   no focal deficits noted     Disposition:  Skilled nursing facility    Activity as tolerated    Diet as tolerated  Diet Order   Procedures   • Diet Regular       Patient Instructions:      Discharge Medications      New Medications      Instructions Start Date   acetaminophen 325 MG tablet  Commonly known as: TYLENOL   650 mg, Oral, Every 4 Hours PRN      aspirin 81 MG chewable tablet   81 mg, Oral, 2 Times Daily      HYDROcodone-acetaminophen 5-325 MG per tablet  Commonly known as: NORCO   1 tablet, Oral, Every 4 Hours PRN         Continue These Medications      Instructions Start Date   fish oil 500 MG capsule capsule   1 capsule, Oral    "   Hair Skin and Nails Formula tablet tablet  Generic drug: multivitamin with minerals   Oral         Stop These Medications    Diclofenac Sodium 1 % gel gel  Commonly known as: VOLTAREN     meloxicam 15 MG tablet  Commonly known as: MOBIC     naproxen sodium 220 MG tablet  Commonly known as: ALEVE     Unable to find          No future appointments.   Contact information for follow-up providers     Merlyn Noonan APRN Follow up.    Specialty: Nurse Practitioner  Contact information:  [address]  [facility] [id number] [phone]             Guilherme Oglesby MD .    Specialty: Internal Medicine  Why: After released from rehab  Contact information:  [address] [facility] [address]  [facility] [id number] [phone]             Jm Barba MD Follow up.    Specialty: Orthopedic Surgery  Contact information:  [address]  [facility] [id number] [phone]             Stanton Goyal MD Follow up in 1 week(s).    Specialty: Urology  Why: leave Kramer catheter for 1 week.  I have asked her to follow up with me in the office in 1 week for voiding trial.  If she is in rehab, they can certainly remove her catheter in rehab as they see fit in roughly 1 week.  Contact information:  [address]  [place]  [facility] [address]  [phone]                   Contact information for after-discharge care     Destination     [facility] .    Service: Skilled Nursing  Contact information:  [address]  [facility] [address]  [phone]                           Discharge Order (From admission, onward)     Start     Ordered    09/17/22 1159  Discharge patient  Once        Expected Discharge Date: 09/17/22    Discharge Disposition: Skilled Nursing Facility (DC - External)    Physician of Record for Attribution - Please select from Treatment Team: LEO ROBIN [[id number]]    Review needed by CMO to determine Physician of Record: No       Question Answer Comment    Physician of Record for Attribution - Please select from Treatment Team JOSUE NEWTON    Review needed by CMO to determine Physician of Record No        09/17/22 1158                Total time spent discharging patient including evaluation,post hospitalization follow up,  medication and post hospitalization instructions and education total time exceeds 30 minutes.    Signed:  Josue Newton MD  9/18/2022  12:31 EDT

## (undated) DEVICE — GLV SURG BIOGEL LTX PF 7

## (undated) DEVICE — APPL CHLORAPREP HI/LITE 26ML ORNG

## (undated) DEVICE — SOL NACL 0.9PCT 100ML SGL

## (undated) DEVICE — TBG PENCL TELESCP MEGADYNE SMOKE EVAC 10FT

## (undated) DEVICE — DRAPE,REIN 53X77,STERILE: Brand: MEDLINE

## (undated) DEVICE — GLV SURG PREMIERPRO ORTHO LTX PF SZ8.5 BRN

## (undated) DEVICE — 3M™ IOBAN™ 2 ANTIMICROBIAL INCISE DRAPE 6640EZ: Brand: IOBAN™ 2

## (undated) DEVICE — SOL ISO/ALC RUB 70PCT 4OZ

## (undated) DEVICE — RECIPROCATING BLADE HEAVY DUTY LONG, OFFSET  (77.6 X 0.77 X 11.2MM)

## (undated) DEVICE — MAT FLR ABSORBENT LG 4FT 10 2.5FT

## (undated) DEVICE — GLV SURG SENSICARE PI MIC PF SZ7 LF STRL

## (undated) DEVICE — NEEDLE, QUINCKE, 20GX3.5": Brand: MEDLINE

## (undated) DEVICE — TRAP FLD MINIVAC MEGADYNE 100ML

## (undated) DEVICE — GLV SURG SIGNATURE ESSENTIAL PF LTX SZ8.5

## (undated) DEVICE — S/M FLEXIBLE ALEXIS ORTHOPAEDIC PROTECTOR: Brand: ALEXIS® ORTHOPAEDIC PROTECTOR

## (undated) DEVICE — SUT ETHIB 2 CV V37 MS/4 30IN MX69G

## (undated) DEVICE — PK ANT HIP 40

## (undated) DEVICE — 450 ML BOTTLE OF 0.05% CHLORHEXIDINE GLUCONATE IN 99.95% STERILE WATER FOR IRRIGATION, USP AND APPLICATOR.: Brand: IRRISEPT ANTIMICROBIAL WOUND LAVAGE